# Patient Record
Sex: FEMALE | Race: WHITE | Employment: OTHER | URBAN - NONMETROPOLITAN AREA
[De-identification: names, ages, dates, MRNs, and addresses within clinical notes are randomized per-mention and may not be internally consistent; named-entity substitution may affect disease eponyms.]

---

## 2017-02-28 RX ORDER — LOVASTATIN 40 MG/1
40 TABLET ORAL NIGHTLY
Qty: 90 TABLET | Refills: 3 | Status: SHIPPED | OUTPATIENT
Start: 2017-02-28 | End: 2018-02-21 | Stop reason: SDUPTHER

## 2017-03-13 ENCOUNTER — OFFICE VISIT (OUTPATIENT)
Dept: PRIMARY CARE CLINIC | Age: 72
End: 2017-03-13
Payer: MEDICARE

## 2017-03-13 VITALS
HEIGHT: 61 IN | OXYGEN SATURATION: 97 % | RESPIRATION RATE: 20 BRPM | WEIGHT: 186 LBS | HEART RATE: 71 BPM | BODY MASS INDEX: 35.12 KG/M2 | DIASTOLIC BLOOD PRESSURE: 72 MMHG | SYSTOLIC BLOOD PRESSURE: 118 MMHG

## 2017-03-13 DIAGNOSIS — E03.9 ACQUIRED HYPOTHYROIDISM: ICD-10-CM

## 2017-03-13 DIAGNOSIS — I10 ESSENTIAL HYPERTENSION: ICD-10-CM

## 2017-03-13 DIAGNOSIS — Z13.820 OSTEOPOROSIS SCREENING: Primary | ICD-10-CM

## 2017-03-13 DIAGNOSIS — E11.9 TYPE 2 DIABETES MELLITUS WITHOUT COMPLICATION, WITHOUT LONG-TERM CURRENT USE OF INSULIN (HCC): ICD-10-CM

## 2017-03-13 DIAGNOSIS — I25.10 ASCVD (ARTERIOSCLEROTIC CARDIOVASCULAR DISEASE): ICD-10-CM

## 2017-03-13 DIAGNOSIS — E78.00 HYPERCHOLESTEROLEMIA: ICD-10-CM

## 2017-03-13 DIAGNOSIS — Z13.820 OSTEOPOROSIS SCREENING: ICD-10-CM

## 2017-03-13 LAB
ALBUMIN SERPL-MCNC: 4.5 G/DL (ref 3.5–5.2)
ALP BLD-CCNC: 66 U/L (ref 35–104)
ALT SERPL-CCNC: 15 U/L (ref 5–33)
ANION GAP SERPL CALCULATED.3IONS-SCNC: 12 MMOL/L (ref 7–19)
AST SERPL-CCNC: 15 U/L (ref 5–32)
BILIRUB SERPL-MCNC: 0.3 MG/DL (ref 0.2–1.2)
BILIRUBIN URINE: NEGATIVE
BLOOD, URINE: NEGATIVE
BUN BLDV-MCNC: 19 MG/DL (ref 8–23)
CALCIUM SERPL-MCNC: 9.4 MG/DL (ref 8.8–10.2)
CHLORIDE BLD-SCNC: 99 MMOL/L (ref 98–111)
CLARITY: CLEAR
CO2: 30 MMOL/L (ref 22–29)
COLOR: YELLOW
CREAT SERPL-MCNC: 0.7 MG/DL (ref 0.5–0.9)
CREATININE URINE: 128.2 MG/DL (ref 4.2–622)
GFR NON-AFRICAN AMERICAN: >60
GLOBULIN: 2.3 G/DL
GLUCOSE BLD-MCNC: 172 MG/DL (ref 74–109)
GLUCOSE URINE: NEGATIVE MG/DL
HBA1C MFR BLD: 7.1 %
HCT VFR BLD CALC: 44.8 % (ref 37–47)
HEMOGLOBIN: 14.6 G/DL (ref 12–16)
KETONES, URINE: NEGATIVE MG/DL
LEUKOCYTE ESTERASE, URINE: NEGATIVE
MCH RBC QN AUTO: 30.2 PG (ref 27–31)
MCHC RBC AUTO-ENTMCNC: 32.6 G/DL (ref 33–37)
MCV RBC AUTO: 92.8 FL (ref 81–99)
MICROALBUMIN UR-MCNC: <1.2 MG/DL (ref 0–19)
MICROALBUMIN/CREAT UR-RTO: NORMAL MG/G
NITRITE, URINE: NEGATIVE
PDW BLD-RTO: 13.2 % (ref 11.5–14.5)
PH UA: 5.5
PLATELET # BLD: 308 K/UL (ref 130–400)
PMV BLD AUTO: 10.9 FL (ref 7.4–10.4)
POTASSIUM SERPL-SCNC: 4.4 MMOL/L (ref 3.5–5)
PROTEIN PROTEIN: 11 MG/DL (ref 15–45)
PROTEIN UA: NEGATIVE MG/DL
RBC # BLD: 4.83 M/UL (ref 4.2–5.4)
SODIUM BLD-SCNC: 141 MMOL/L (ref 136–145)
SPECIFIC GRAVITY UA: 1.02
TOTAL PROTEIN: 6.8 G/DL (ref 6.6–8.7)
TSH SERPL DL<=0.05 MIU/L-ACNC: 1.18 UIU/ML (ref 0.27–4.2)
UROBILINOGEN, URINE: 0.2 E.U./DL
WBC # BLD: 7.8 K/UL (ref 4.8–10.8)

## 2017-03-13 PROCEDURE — G8400 PT W/DXA NO RESULTS DOC: HCPCS | Performed by: INTERNAL MEDICINE

## 2017-03-13 PROCEDURE — 1036F TOBACCO NON-USER: CPT | Performed by: INTERNAL MEDICINE

## 2017-03-13 PROCEDURE — 3017F COLORECTAL CA SCREEN DOC REV: CPT | Performed by: INTERNAL MEDICINE

## 2017-03-13 PROCEDURE — G8484 FLU IMMUNIZE NO ADMIN: HCPCS | Performed by: INTERNAL MEDICINE

## 2017-03-13 PROCEDURE — 3045F PR MOST RECENT HEMOGLOBIN A1C LEVEL 7.0-9.0%: CPT | Performed by: INTERNAL MEDICINE

## 2017-03-13 PROCEDURE — 1123F ACP DISCUSS/DSCN MKR DOCD: CPT | Performed by: INTERNAL MEDICINE

## 2017-03-13 PROCEDURE — 3014F SCREEN MAMMO DOC REV: CPT | Performed by: INTERNAL MEDICINE

## 2017-03-13 PROCEDURE — 1090F PRES/ABSN URINE INCON ASSESS: CPT | Performed by: INTERNAL MEDICINE

## 2017-03-13 PROCEDURE — G8598 ASA/ANTIPLAT THER USED: HCPCS | Performed by: INTERNAL MEDICINE

## 2017-03-13 PROCEDURE — G8417 CALC BMI ABV UP PARAM F/U: HCPCS | Performed by: INTERNAL MEDICINE

## 2017-03-13 PROCEDURE — 99214 OFFICE O/P EST MOD 30 MIN: CPT | Performed by: INTERNAL MEDICINE

## 2017-03-13 PROCEDURE — 4040F PNEUMOC VAC/ADMIN/RCVD: CPT | Performed by: INTERNAL MEDICINE

## 2017-03-13 PROCEDURE — G8427 DOCREV CUR MEDS BY ELIG CLIN: HCPCS | Performed by: INTERNAL MEDICINE

## 2017-03-13 ASSESSMENT — ENCOUNTER SYMPTOMS
CONSTIPATION: 0
SHORTNESS OF BREATH: 0
COUGH: 0
NAUSEA: 0
BACK PAIN: 0
DIARRHEA: 0
VOMITING: 0

## 2017-03-31 ENCOUNTER — TELEPHONE (OUTPATIENT)
Dept: PRIMARY CARE CLINIC | Age: 72
End: 2017-03-31

## 2017-04-13 ENCOUNTER — TELEPHONE (OUTPATIENT)
Dept: NEUROLOGY | Age: 72
End: 2017-04-13

## 2017-04-18 ENCOUNTER — TELEPHONE (OUTPATIENT)
Dept: PRIMARY CARE CLINIC | Age: 72
End: 2017-04-18

## 2017-04-20 DIAGNOSIS — R79.89 ABNORMAL THYROID BLOOD TEST: ICD-10-CM

## 2017-04-20 RX ORDER — LEVOTHYROXINE SODIUM 0.12 MG/1
125 TABLET ORAL DAILY
Qty: 30 TABLET | Refills: 11 | OUTPATIENT
Start: 2017-04-20 | End: 2018-02-26

## 2017-06-09 ENCOUNTER — OFFICE VISIT (OUTPATIENT)
Dept: NEUROLOGY | Age: 72
End: 2017-06-09
Payer: MEDICARE

## 2017-06-09 VITALS
SYSTOLIC BLOOD PRESSURE: 127 MMHG | DIASTOLIC BLOOD PRESSURE: 73 MMHG | HEIGHT: 61 IN | HEART RATE: 71 BPM | WEIGHT: 191 LBS | OXYGEN SATURATION: 95 % | BODY MASS INDEX: 36.06 KG/M2

## 2017-06-09 DIAGNOSIS — G47.33 OBSTRUCTIVE SLEEP APNEA (ADULT) (PEDIATRIC): Primary | ICD-10-CM

## 2017-06-09 DIAGNOSIS — Z99.89 CPAP (CONTINUOUS POSITIVE AIRWAY PRESSURE) DEPENDENCE: ICD-10-CM

## 2017-06-09 DIAGNOSIS — G47.61 PERIODIC LIMB MOVEMENT DISORDER: ICD-10-CM

## 2017-06-09 DIAGNOSIS — G44.59 OTHER COMPLICATED HEADACHE SYNDROME: ICD-10-CM

## 2017-06-09 DIAGNOSIS — G25.81 RESTLESS LEG SYNDROME: ICD-10-CM

## 2017-06-09 PROBLEM — R51.9 HEADACHE: Status: ACTIVE | Noted: 2017-06-09

## 2017-06-09 PROCEDURE — 99214 OFFICE O/P EST MOD 30 MIN: CPT | Performed by: PHYSICIAN ASSISTANT

## 2017-06-19 ENCOUNTER — OFFICE VISIT (OUTPATIENT)
Dept: PRIMARY CARE CLINIC | Age: 72
End: 2017-06-19
Payer: MEDICARE

## 2017-06-19 VITALS
DIASTOLIC BLOOD PRESSURE: 64 MMHG | SYSTOLIC BLOOD PRESSURE: 118 MMHG | BODY MASS INDEX: 34.78 KG/M2 | HEART RATE: 80 BPM | RESPIRATION RATE: 18 BRPM | HEIGHT: 62 IN | OXYGEN SATURATION: 98 % | WEIGHT: 189 LBS

## 2017-06-19 DIAGNOSIS — E78.00 HYPERCHOLESTEROLEMIA: ICD-10-CM

## 2017-06-19 DIAGNOSIS — G44.1 OTHER VASCULAR HEADACHE: ICD-10-CM

## 2017-06-19 DIAGNOSIS — Z13.820 OSTEOPOROSIS SCREENING: Primary | ICD-10-CM

## 2017-06-19 DIAGNOSIS — I25.10 ASCVD (ARTERIOSCLEROTIC CARDIOVASCULAR DISEASE): ICD-10-CM

## 2017-06-19 PROCEDURE — 99214 OFFICE O/P EST MOD 30 MIN: CPT | Performed by: INTERNAL MEDICINE

## 2017-06-19 ASSESSMENT — ENCOUNTER SYMPTOMS
COUGH: 0
CONSTIPATION: 0
VOMITING: 0
NAUSEA: 0
SHORTNESS OF BREATH: 0
DIARRHEA: 0
BACK PAIN: 0

## 2017-07-03 RX ORDER — VENLAFAXINE HYDROCHLORIDE 150 MG/1
150 CAPSULE, EXTENDED RELEASE ORAL DAILY
Qty: 30 CAPSULE | Refills: 5 | Status: SHIPPED | OUTPATIENT
Start: 2017-07-03 | End: 2017-10-31 | Stop reason: SDUPTHER

## 2017-07-14 ENCOUNTER — HOSPITAL ENCOUNTER (OUTPATIENT)
Dept: WOMENS IMAGING | Age: 72
Discharge: HOME | End: 2017-07-14
Payer: MEDICARE

## 2017-07-14 DIAGNOSIS — Z13.820 OSTEOPOROSIS SCREENING: ICD-10-CM

## 2017-07-14 PROCEDURE — 77080 DXA BONE DENSITY AXIAL: CPT

## 2017-07-17 ENCOUNTER — TELEPHONE (OUTPATIENT)
Dept: PRIMARY CARE CLINIC | Age: 72
End: 2017-07-17

## 2017-07-17 RX ORDER — ALENDRONATE SODIUM 70 MG/1
70 TABLET ORAL
Qty: 4 TABLET | Refills: 3 | Status: SHIPPED | OUTPATIENT
Start: 2017-07-17 | End: 2017-10-31 | Stop reason: SDUPTHER

## 2017-08-16 RX ORDER — LISINOPRIL 10 MG/1
10 TABLET ORAL DAILY
Qty: 30 TABLET | Refills: 5 | Status: SHIPPED | OUTPATIENT
Start: 2017-08-16 | End: 2018-02-25 | Stop reason: SDUPTHER

## 2017-09-19 ENCOUNTER — OFFICE VISIT (OUTPATIENT)
Dept: PRIMARY CARE CLINIC | Age: 72
End: 2017-09-19
Payer: MEDICARE

## 2017-09-19 VITALS
HEIGHT: 62 IN | HEART RATE: 77 BPM | TEMPERATURE: 97.2 F | SYSTOLIC BLOOD PRESSURE: 120 MMHG | OXYGEN SATURATION: 95 % | DIASTOLIC BLOOD PRESSURE: 68 MMHG | WEIGHT: 185.6 LBS | BODY MASS INDEX: 34.16 KG/M2

## 2017-09-19 DIAGNOSIS — I10 ESSENTIAL HYPERTENSION: ICD-10-CM

## 2017-09-19 DIAGNOSIS — E03.9 ACQUIRED HYPOTHYROIDISM: Primary | ICD-10-CM

## 2017-09-19 DIAGNOSIS — I25.10 ASCVD (ARTERIOSCLEROTIC CARDIOVASCULAR DISEASE): ICD-10-CM

## 2017-09-19 PROCEDURE — 99214 OFFICE O/P EST MOD 30 MIN: CPT | Performed by: INTERNAL MEDICINE

## 2017-09-19 ASSESSMENT — ENCOUNTER SYMPTOMS
VOMITING: 0
SHORTNESS OF BREATH: 0
NAUSEA: 0
BACK PAIN: 0
DIARRHEA: 0
COUGH: 0
CONSTIPATION: 0

## 2017-09-25 ENCOUNTER — TELEPHONE (OUTPATIENT)
Dept: PRIMARY CARE CLINIC | Age: 72
End: 2017-09-25

## 2017-09-27 DIAGNOSIS — I25.10 UNSPECIFIED CARDIOVASCULAR DISEASE: Primary | ICD-10-CM

## 2017-09-28 ENCOUNTER — HOSPITAL ENCOUNTER (OUTPATIENT)
Dept: NON INVASIVE DIAGNOSTICS | Age: 72
Discharge: HOME | End: 2017-09-28
Payer: MEDICARE

## 2017-09-28 VITALS
SYSTOLIC BLOOD PRESSURE: 118 MMHG | WEIGHT: 185.19 LBS | BODY MASS INDEX: 33.87 KG/M2 | DIASTOLIC BLOOD PRESSURE: 72 MMHG | HEART RATE: 67 BPM | RESPIRATION RATE: 20 BRPM

## 2017-09-28 DIAGNOSIS — I25.10 UNSPECIFIED CARDIOVASCULAR DISEASE: ICD-10-CM

## 2017-09-28 DIAGNOSIS — I25.10 ASCVD (ARTERIOSCLEROTIC CARDIOVASCULAR DISEASE): ICD-10-CM

## 2017-09-28 PROCEDURE — 2580000003 HC RX 258: Performed by: INTERNAL MEDICINE

## 2017-09-28 PROCEDURE — 93017 CV STRESS TEST TRACING ONLY: CPT

## 2017-09-28 PROCEDURE — 6360000002 HC RX W HCPCS: Performed by: INTERNAL MEDICINE

## 2017-09-28 RX ORDER — DOBUTAMINE HYDROCHLORIDE 200 MG/100ML
10 INJECTION INTRAVENOUS CONTINUOUS PRN
Status: ACTIVE | OUTPATIENT
Start: 2017-09-28 | End: 2017-09-29

## 2017-09-28 RX ORDER — SODIUM CHLORIDE 9 MG/ML
INJECTION, SOLUTION INTRAVENOUS
Status: COMPLETED | OUTPATIENT
Start: 2017-09-28 | End: 2017-09-28

## 2017-09-28 RX ADMIN — SODIUM CHLORIDE: 9 INJECTION, SOLUTION INTRAVENOUS at 11:10

## 2017-09-28 RX ADMIN — DOBUTAMINE IN DEXTROSE 30 MCG/KG/MIN: 200 INJECTION, SOLUTION INTRAVENOUS at 11:21

## 2017-09-28 RX ADMIN — DOBUTAMINE IN DEXTROSE 10 MCG/KG/MIN: 200 INJECTION, SOLUTION INTRAVENOUS at 11:17

## 2017-10-16 DIAGNOSIS — E07.9 THYROID CONDITION: Primary | ICD-10-CM

## 2017-10-16 DIAGNOSIS — E07.9 THYROID CONDITION: ICD-10-CM

## 2017-10-16 LAB — TSH SERPL DL<=0.05 MIU/L-ACNC: 13.13 UIU/ML (ref 0.27–4.2)

## 2017-10-19 ENCOUNTER — TELEPHONE (OUTPATIENT)
Dept: PRIMARY CARE CLINIC | Age: 72
End: 2017-10-19

## 2017-10-19 RX ORDER — LEVOTHYROXINE SODIUM 0.15 MG/1
150 TABLET ORAL DAILY
Qty: 30 TABLET | Refills: 3 | Status: SHIPPED | OUTPATIENT
Start: 2017-10-19 | End: 2018-02-26 | Stop reason: SDUPTHER

## 2017-10-31 RX ORDER — ALENDRONATE SODIUM 70 MG/1
70 TABLET ORAL
Qty: 4 TABLET | Refills: 3 | Status: SHIPPED | OUTPATIENT
Start: 2017-10-31 | End: 2018-02-28 | Stop reason: SDUPTHER

## 2017-10-31 RX ORDER — VENLAFAXINE HYDROCHLORIDE 150 MG/1
150 CAPSULE, EXTENDED RELEASE ORAL DAILY
Qty: 30 CAPSULE | Refills: 5 | Status: SHIPPED | OUTPATIENT
Start: 2017-10-31 | End: 2018-01-21

## 2017-11-29 ENCOUNTER — OFFICE VISIT (OUTPATIENT)
Dept: URGENT CARE | Age: 72
End: 2017-11-29
Payer: MEDICARE

## 2017-11-29 ENCOUNTER — HOSPITAL ENCOUNTER (OUTPATIENT)
Dept: GENERAL RADIOLOGY | Age: 72
Discharge: HOME | End: 2017-11-29
Payer: MEDICARE

## 2017-11-29 VITALS
OXYGEN SATURATION: 94 % | DIASTOLIC BLOOD PRESSURE: 66 MMHG | BODY MASS INDEX: 34.41 KG/M2 | SYSTOLIC BLOOD PRESSURE: 113 MMHG | HEIGHT: 62 IN | HEART RATE: 80 BPM | RESPIRATION RATE: 18 BRPM | WEIGHT: 187 LBS | TEMPERATURE: 98.6 F

## 2017-11-29 DIAGNOSIS — M25.511 ACUTE PAIN OF RIGHT SHOULDER: ICD-10-CM

## 2017-11-29 DIAGNOSIS — M62.838 MUSCLE SPASM: ICD-10-CM

## 2017-11-29 DIAGNOSIS — M25.511 ACUTE PAIN OF RIGHT SHOULDER: Primary | ICD-10-CM

## 2017-11-29 PROCEDURE — 73030 X-RAY EXAM OF SHOULDER: CPT

## 2017-11-29 PROCEDURE — 1090F PRES/ABSN URINE INCON ASSESS: CPT | Performed by: NURSE PRACTITIONER

## 2017-11-29 PROCEDURE — G8427 DOCREV CUR MEDS BY ELIG CLIN: HCPCS | Performed by: NURSE PRACTITIONER

## 2017-11-29 PROCEDURE — G8399 PT W/DXA RESULTS DOCUMENT: HCPCS | Performed by: NURSE PRACTITIONER

## 2017-11-29 PROCEDURE — G8417 CALC BMI ABV UP PARAM F/U: HCPCS | Performed by: NURSE PRACTITIONER

## 2017-11-29 PROCEDURE — 99213 OFFICE O/P EST LOW 20 MIN: CPT | Performed by: NURSE PRACTITIONER

## 2017-11-29 PROCEDURE — 3014F SCREEN MAMMO DOC REV: CPT | Performed by: NURSE PRACTITIONER

## 2017-11-29 PROCEDURE — 4040F PNEUMOC VAC/ADMIN/RCVD: CPT | Performed by: NURSE PRACTITIONER

## 2017-11-29 PROCEDURE — G8484 FLU IMMUNIZE NO ADMIN: HCPCS | Performed by: NURSE PRACTITIONER

## 2017-11-29 PROCEDURE — G8598 ASA/ANTIPLAT THER USED: HCPCS | Performed by: NURSE PRACTITIONER

## 2017-11-29 PROCEDURE — 1123F ACP DISCUSS/DSCN MKR DOCD: CPT | Performed by: NURSE PRACTITIONER

## 2017-11-29 PROCEDURE — 1036F TOBACCO NON-USER: CPT | Performed by: NURSE PRACTITIONER

## 2017-11-29 PROCEDURE — 3017F COLORECTAL CA SCREEN DOC REV: CPT | Performed by: NURSE PRACTITIONER

## 2017-11-29 RX ORDER — PREDNISONE 10 MG/1
TABLET ORAL
Qty: 30 TABLET | Refills: 0 | Status: SHIPPED | OUTPATIENT
Start: 2017-11-29 | End: 2018-09-20

## 2017-11-29 RX ORDER — BACLOFEN 10 MG/1
10 TABLET ORAL 2 TIMES DAILY
Qty: 30 TABLET | Refills: 0 | Status: SHIPPED | OUTPATIENT
Start: 2017-11-29 | End: 2019-11-13

## 2017-11-29 ASSESSMENT — ENCOUNTER SYMPTOMS: RESPIRATORY NEGATIVE: 1

## 2017-11-29 NOTE — PATIENT INSTRUCTIONS
Patient Education        Shoulder Pain: Care Instructions  Your Care Instructions    You can hurt your shoulder by using it too much during an activity, such as fishing or baseball. It can also happen as part of the everyday wear and tear of getting older. Shoulder injuries can be slow to heal, but your shoulder should get better with time. Your doctor may recommend a sling to rest your shoulder. If you have injured your shoulder, you may need testing and treatment. Follow-up care is a key part of your treatment and safety. Be sure to make and go to all appointments, and call your doctor if you are having problems. It's also a good idea to know your test results and keep a list of the medicines you take. How can you care for yourself at home? · Take pain medicines exactly as directed. ¨ If the doctor gave you a prescription medicine for pain, take it as prescribed. ¨ If you are not taking a prescription pain medicine, ask your doctor if you can take an over-the-counter medicine. ¨ Do not take two or more pain medicines at the same time unless the doctor told you to. Many pain medicines contain acetaminophen, which is Tylenol. Too much acetaminophen (Tylenol) can be harmful. · If your doctor recommends that you wear a sling, use it as directed. Do not take it off before your doctor tells you to. · Put ice or a cold pack on the sore area for 10 to 20 minutes at a time. Put a thin cloth between the ice and your skin. · If there is no swelling, you can put moist heat, a heating pad, or a warm cloth on your shoulder. Some doctors suggest alternating between hot and cold. · Rest your shoulder for a few days. If your doctor recommends it, you can then begin gentle exercise of the shoulder, but do not lift anything heavy. When should you call for help? Call 911 anytime you think you may need emergency care. For example, call if:  · You have chest pain or pressure.  This may occur with:  ¨ Sweating. ¨ Shortness of breath. ¨ Nausea or vomiting. ¨ Pain that spreads from the chest to the neck, jaw, or one or both shoulders or arms. ¨ Dizziness or lightheadedness. ¨ A fast or uneven pulse. After calling 911, chew 1 adult-strength aspirin. Wait for an ambulance. Do not try to drive yourself. · Your arm or hand is cool or pale or changes color. Call your doctor now or seek immediate medical care if:  · You have signs of infection, such as:  ¨ Increased pain, swelling, warmth, or redness in your shoulder. ¨ Red streaks leading from a place on your shoulder. ¨ Pus draining from an area of your shoulder. ¨ Swollen lymph nodes in your neck, armpits, or groin. ¨ A fever. Watch closely for changes in your health, and be sure to contact your doctor if:  · You cannot use your shoulder. · Your shoulder does not get better as expected. Where can you learn more? Go to [email or web address] org and sign in to your twidox account. Enter M485 in the [employer] box to learn more about \"Shoulder Pain: Care Instructions. \"     If you do not have an account, please click on the \"Sign Up Now\" link. Current as of: March 21, 2017  Content Version: 11.3  © 5593-3989 [employer]. Care instructions adapted under license by [facility] ([facility]). If you have questions about a medical condition or this instruction, always ask your healthcare professional. [place] [address] any warranty or liability for your use of this information. Patient Education        Shoulder Stretches: Exercises  Your Care Instructions  Here are some examples of exercises for your shoulder. Start each exercise slowly. Ease off the exercise if you start to have pain. Your doctor or physical therapist will tell you when you can start these exercises and which ones will work best for you.   How to do the exercises  Note: These exercises should cause you to feel a gentle stretch, but no pain. Shoulder stretch    1.  a doorway and place one arm against the door frame. Your elbow should be a little higher than your shoulder. 2. Relax your shoulders as you lean forward, allowing your chest and shoulder muscles to stretch. You can also turn your body slightly away from your arm to stretch the muscles even more. 3. Hold for 15 to 30 seconds. 4. Repeat 2 to 4 times with each arm. Shoulder and chest stretch    Shoulder and chest stretch  1. While sitting, relax your upper body so you slump slightly in your chair. 2. As you breathe in, straighten your back and open your arms out to the sides. 3. Gently pull your shoulder blades back and downward. 4. Hold for 15 to 30 seconds as your breathe normally. 5. Repeat 2 to 4 times. Overhead stretch    1. Reach up over your head with both arms. 2. Hold for 15 to 30 seconds. 3. Repeat 2 to 4 times. Follow-up care is a key part of your treatment and safety. Be sure to make and go to all appointments, and call your doctor if you are having problems. It's also a good idea to know your test results and keep a list of the medicines you take. Where can you learn more? Go to [email or web address] org and sign in to your IWT account. Enter S254 in the [facility] box to learn more about \"Shoulder Stretches: Exercises. \"     If you do not have an account, please click on the \"Sign Up Now\" link. Current as of: March 21, 2017  Content Version: 11.3  © 5762-1853 [employer]. Care instructions adapted under license by [facility] ([facility]). If you have questions about a medical condition or this instruction, always ask your healthcare professional. [address] any warranty or liability for your use of this information. 1. Take prednisone as directed  2. Continue Tylenol/Ibuprofen as needed for pain  3. She may use baclofen as directed  4. Rest, use ice/heat as needed.    5. Do light ROM exercises and stretches  6.  If patient is not improving or developing any new/worsening symptoms then RTC, or follow up with PCP as needed for possible PT or ortho consult

## 2017-11-29 NOTE — PROGRESS NOTES
[address] [address]  Dept: [phone]  Loc: [phone]    Kalyan Gillis is a 71 y.o. female who presents today for her medical conditions/complaints as noted below. Kalyan Gillis is c/o of Shoulder Injury (Right shoulder pain from fall 2 weeks ago.)        HPI:     HPI    Patient presents to office complaining of right shoulder pain. She states that she fell 2 weeks ago, she tripped over a cord. She reports that she fell flat on that shoulder. She states that her pain has gotten worse. She reports that it is now hard to move her arm above her head to fix her hair. She rates pain a 10/10. She reports that she has been taking Tylenol and Ibuprofen as needed for pain. She has been using a pain patch on and off as well. She states that the pain is getting worse and she is having trouble sleeping with it. Past Medical History:   Diagnosis Date    CAD (coronary artery disease)     Carotid artery disease (HCC)     CPAP (continuous positive airway pressure) dependence     11cm    Depression     Diabetes (HCC)     Fibromyalgia     Hypothyroidism     JOSE (obstructive sleep apnea)     AHI:  27.3 per PSG, 2011/CPAP    PLMD (periodic limb movement disorder)     RLS (restless legs syndrome)       Past Surgical History:   Procedure Laterality Date    OTHER SURGICAL HISTORY      teeth extractions    PTCA      with stents    TUBAL LIGATION         History reviewed. No pertinent family history.     Social History   Substance Use Topics    Smoking status: Never Smoker    Smokeless tobacco: Never Used    Alcohol use No      Current Outpatient Prescriptions   Medication Sig Dispense Refill    alendronate (FOSAMAX) 70 MG tablet Take 1 tablet by mouth every 7 days 4 tablet 3    venlafaxine (EFFEXOR XR) 150 MG extended release capsule Take 1 capsule by mouth daily 30 capsule 5    levothyroxine (SYNTHROID) 150 MCG tablet Take 1 tablet by mouth Daily 30 tablet 3    metFORMIN (GLUCOPHAGE) 1000 MG tablet Take 1 tablet by mouth 2 times daily (with meals) 60 tablet 5    lisinopril (PRINIVIL;ZESTRIL) 10 MG tablet Take 1 tablet by mouth daily 30 tablet 5    levothyroxine (LEVOTHROID) 125 MCG tablet Take 1 tablet by mouth daily 30 tablet 11    vitamin D (CHOLECALCIFEROL) 1000 UNIT TABS tablet Take 1,000 Units by mouth daily      lovastatin (MEVACOR) 40 MG tablet Take 1 tablet by mouth nightly 90 tablet 3    clopidogrel (PLAVIX) 75 MG tablet Take 75 mg by mouth daily   2    glimepiride (AMARYL) 4 MG tablet Take 4 mg by mouth 2 times daily   1    vitamin B-12 (CYANOCOBALAMIN) 1000 MCG tablet Take 5,000 mcg by mouth daily       Pediatric Multivitamins-Fl (MULT-VITAMIN/FLUORIDE PO) Take by mouth daily        No current facility-administered medications for this visit. Allergies   Allergen Reactions    Bee Venom Shortness Of Breath and Swelling    Adhesive Tape Rash       Health Maintenance   Topic Date Due    Diabetic foot exam  01/16/1955    Diabetic retinal exam  01/16/1955    Pneumococcal low/med risk (1 of 2 - PCV13) 01/16/2011    Flu vaccine (1) 09/01/2017    Zostavax vaccine  07/25/2018 (Originally 1/16/2006)    Lipid screen  12/16/2017    Diabetic hemoglobin A1C test  03/13/2018    Diabetic microalbuminuria test  03/13/2018    Breast cancer screen  10/21/2018    DTaP/Tdap/Td vaccine (2 - Td) 06/09/2025    Colon cancer screen colonoscopy  01/21/2026    DEXA (modify frequency per FRAX score)  Completed    Hepatitis C screen  Completed       Subjective:      Review of Systems   Respiratory: Negative. Cardiovascular: Negative. Musculoskeletal:        Right shoulder pain         Objective:     Physical Exam   Constitutional: She is oriented to person, place, and time. She appears well-developed and well-nourished. No distress. HENT:   Head: Normocephalic and atraumatic.    Eyes: Pupils are equal,

## 2018-01-21 RX ORDER — VENLAFAXINE HYDROCHLORIDE 150 MG/1
CAPSULE, EXTENDED RELEASE ORAL
Qty: 30 CAPSULE | Refills: 5 | Status: SHIPPED | OUTPATIENT
Start: 2018-01-21 | End: 2018-07-30 | Stop reason: SDUPTHER

## 2018-02-26 RX ORDER — LISINOPRIL 10 MG/1
10 TABLET ORAL DAILY
Qty: 30 TABLET | Refills: 5 | Status: SHIPPED | OUTPATIENT
Start: 2018-02-26 | End: 2018-10-15 | Stop reason: SDUPTHER

## 2018-02-26 RX ORDER — LEVOTHYROXINE SODIUM 0.15 MG/1
150 TABLET ORAL DAILY
Qty: 30 TABLET | Refills: 3 | Status: SHIPPED | OUTPATIENT
Start: 2018-02-26 | End: 2018-03-27 | Stop reason: SDUPTHER

## 2018-02-28 RX ORDER — ALENDRONATE SODIUM 70 MG/1
70 TABLET ORAL
Qty: 12 TABLET | Refills: 3 | Status: SHIPPED | OUTPATIENT
Start: 2018-02-28 | End: 2018-04-02 | Stop reason: SDUPTHER

## 2018-03-19 ENCOUNTER — OFFICE VISIT (OUTPATIENT)
Dept: PRIMARY CARE CLINIC | Age: 73
End: 2018-03-19
Payer: MEDICARE

## 2018-03-19 VITALS
SYSTOLIC BLOOD PRESSURE: 130 MMHG | HEIGHT: 61 IN | DIASTOLIC BLOOD PRESSURE: 64 MMHG | BODY MASS INDEX: 32.28 KG/M2 | OXYGEN SATURATION: 93 % | TEMPERATURE: 97.5 F | HEART RATE: 116 BPM | WEIGHT: 171 LBS

## 2018-03-19 DIAGNOSIS — I25.10 ASCVD (ARTERIOSCLEROTIC CARDIOVASCULAR DISEASE): ICD-10-CM

## 2018-03-19 DIAGNOSIS — E78.00 HYPERCHOLESTEROLEMIA: ICD-10-CM

## 2018-03-19 DIAGNOSIS — E11.9 TYPE 2 DIABETES MELLITUS WITHOUT COMPLICATION, WITHOUT LONG-TERM CURRENT USE OF INSULIN (HCC): ICD-10-CM

## 2018-03-19 DIAGNOSIS — I10 ESSENTIAL HYPERTENSION: ICD-10-CM

## 2018-03-19 DIAGNOSIS — E03.9 ACQUIRED HYPOTHYROIDISM: Primary | ICD-10-CM

## 2018-03-19 DIAGNOSIS — Z13.9 SCREENING FOR CONDITION: ICD-10-CM

## 2018-03-19 DIAGNOSIS — M79.7 FIBROMYALGIA: ICD-10-CM

## 2018-03-19 PROCEDURE — 3014F SCREEN MAMMO DOC REV: CPT | Performed by: INTERNAL MEDICINE

## 2018-03-19 PROCEDURE — G8417 CALC BMI ABV UP PARAM F/U: HCPCS | Performed by: INTERNAL MEDICINE

## 2018-03-19 PROCEDURE — 1090F PRES/ABSN URINE INCON ASSESS: CPT | Performed by: INTERNAL MEDICINE

## 2018-03-19 PROCEDURE — G8399 PT W/DXA RESULTS DOCUMENT: HCPCS | Performed by: INTERNAL MEDICINE

## 2018-03-19 PROCEDURE — 99214 OFFICE O/P EST MOD 30 MIN: CPT | Performed by: INTERNAL MEDICINE

## 2018-03-19 PROCEDURE — 1123F ACP DISCUSS/DSCN MKR DOCD: CPT | Performed by: INTERNAL MEDICINE

## 2018-03-19 PROCEDURE — 3046F HEMOGLOBIN A1C LEVEL >9.0%: CPT | Performed by: INTERNAL MEDICINE

## 2018-03-19 PROCEDURE — G8598 ASA/ANTIPLAT THER USED: HCPCS | Performed by: INTERNAL MEDICINE

## 2018-03-19 PROCEDURE — 1036F TOBACCO NON-USER: CPT | Performed by: INTERNAL MEDICINE

## 2018-03-19 PROCEDURE — 3017F COLORECTAL CA SCREEN DOC REV: CPT | Performed by: INTERNAL MEDICINE

## 2018-03-19 PROCEDURE — G8427 DOCREV CUR MEDS BY ELIG CLIN: HCPCS | Performed by: INTERNAL MEDICINE

## 2018-03-19 PROCEDURE — 4040F PNEUMOC VAC/ADMIN/RCVD: CPT | Performed by: INTERNAL MEDICINE

## 2018-03-19 PROCEDURE — G8484 FLU IMMUNIZE NO ADMIN: HCPCS | Performed by: INTERNAL MEDICINE

## 2018-03-19 ASSESSMENT — ENCOUNTER SYMPTOMS
SINUS PRESSURE: 0
NAUSEA: 0
BACK PAIN: 1
VOMITING: 0
CONSTIPATION: 0
SHORTNESS OF BREATH: 0
DIARRHEA: 0
SINUS PAIN: 0
COUGH: 0

## 2018-03-19 NOTE — PROGRESS NOTES
[facility]  [facility] [facility]  [address] [place] [address]  Dept: [phone]  Dept Fax: [phone]  Loc: [phone]    Flaco Mckeon is a 68 y.o. female who presents today for her medical conditions/complaints as noted below. Flaco Mckeon is c/o of   Chief Complaint   Patient presents with    6 Month Follow-Up    Chronic Pain         HPI:     HPI  Ms. Duane Key returns for follow-up of hypothyroidism, hypertension, coronary artery disease, type 2 diabetes. She is doing well. Her breathing pattern is stable. Her appetite is stable. She is due for blood work as well as mammograms. She denies any new complaints. Hemoglobin A1C (%)   Date Value   03/23/2018 12.2 (H)   03/13/2017 7.1 (H)   12/16/2016 8.6 (H)             ( goal A1C is < 7)   Microalbumin, Random Urine (mg/dL)   Date Value   03/13/2017 <1.20     LDL Calculated (mg/dL)   Date Value   03/23/2018 55   12/16/2016 126       (goal LDL is <100)   AST (U/L)   Date Value   03/23/2018 16     ALT (U/L)   Date Value   03/23/2018 18     BUN (mg/dL)   Date Value   03/23/2018 14     BP Readings from Last 3 Encounters:   03/19/18 130/64   11/29/17 113/66   09/28/17 118/72          (goal 120/80)    Past Medical History:   Diagnosis Date    CAD (coronary artery disease)     Carotid artery disease (HCC)     CPAP (continuous positive airway pressure) dependence     11cm    Depression     Diabetes (HCC)     Fibromyalgia     Hypothyroidism     JOSE (obstructive sleep apnea)     AHI:  27.3 per PSG, 2011/CPAP    PLMD (periodic limb movement disorder)     RLS (restless legs syndrome)       Past Surgical History:   Procedure Laterality Date    OTHER SURGICAL HISTORY      teeth extractions    PTCA      with stents    TUBAL LIGATION         History reviewed. No pertinent family history.     Social History   Substance Use Topics    Smoking status: Never Smoker    Smokeless tobacco: Never Used   Sarkar Protein, urine, random    TSH without Reflex    Urinalysis    Lipid Panel   3. ASCVD (arteriosclerotic cardiovascular disease)  CBC    Comprehensive Metabolic Panel    Creatinine, Random Urine    Protein, urine, random    TSH without Reflex    Urinalysis    Lipid Panel   4. Hypercholesterolemia  CBC    Comprehensive Metabolic Panel    Creatinine, Random Urine    Protein, urine, random    TSH without Reflex    Urinalysis    Lipid Panel   5. Fibromyalgia  CBC    Comprehensive Metabolic Panel    Creatinine, Random Urine    Protein, urine, random    TSH without Reflex    Urinalysis    Lipid Panel   6. Type 2 diabetes mellitus without complication, without long-term current use of insulin (HCC)  Hemoglobin A1C   7. Screening for condition  CONNOR DIGITAL SCREEN BILATERAL             Plan:      Return in about 6 months (around 9/19/2018). Patient Instructions   Fasting blood work at your convenience. Continue current medications. Obtain mammograms. Follow up again in 6 months.       Orders Placed This Encounter   Procedures    CONNOR DIGITAL SCREEN BILATERAL     Standing Status:   Future     Standing Expiration Date:   5/19/2019    CBC     Standing Status:   Future     Number of Occurrences:   1     Standing Expiration Date:   3/19/2019    Comprehensive Metabolic Panel     Standing Status:   Future     Number of Occurrences:   1     Standing Expiration Date:   3/19/2019    Creatinine, Random Urine     Standing Status:   Future     Number of Occurrences:   1     Standing Expiration Date:   3/19/2019    Protein, urine, random     Standing Status:   Future     Number of Occurrences:   1     Standing Expiration Date:   3/19/2019    TSH without Reflex     Standing Status:   Future     Number of Occurrences:   1     Standing Expiration Date:   3/19/2019    Urinalysis     Standing Status:   Future     Number of Occurrences:   1     Standing Expiration Date:   3/19/2019    Lipid Panel     Standing Status:   Future     Number

## 2018-03-23 DIAGNOSIS — E03.9 ACQUIRED HYPOTHYROIDISM: ICD-10-CM

## 2018-03-23 DIAGNOSIS — E11.9 TYPE 2 DIABETES MELLITUS WITHOUT COMPLICATION, WITHOUT LONG-TERM CURRENT USE OF INSULIN (HCC): ICD-10-CM

## 2018-03-23 DIAGNOSIS — M79.7 FIBROMYALGIA: ICD-10-CM

## 2018-03-23 DIAGNOSIS — E78.00 HYPERCHOLESTEROLEMIA: ICD-10-CM

## 2018-03-23 DIAGNOSIS — I25.10 ASCVD (ARTERIOSCLEROTIC CARDIOVASCULAR DISEASE): ICD-10-CM

## 2018-03-23 DIAGNOSIS — I10 ESSENTIAL HYPERTENSION: ICD-10-CM

## 2018-03-23 LAB
ALBUMIN SERPL-MCNC: 3.9 G/DL (ref 3.5–5.2)
ALP BLD-CCNC: 81 U/L (ref 35–104)
ALT SERPL-CCNC: 18 U/L (ref 5–33)
ANION GAP SERPL CALCULATED.3IONS-SCNC: 13 MMOL/L (ref 7–19)
AST SERPL-CCNC: 16 U/L (ref 5–32)
BILIRUB SERPL-MCNC: 0.5 MG/DL (ref 0.2–1.2)
BILIRUBIN URINE: NEGATIVE
BLOOD, URINE: NEGATIVE
BUN BLDV-MCNC: 14 MG/DL (ref 8–23)
CALCIUM SERPL-MCNC: 9.3 MG/DL (ref 8.8–10.2)
CHLORIDE BLD-SCNC: 101 MMOL/L (ref 98–111)
CHOLESTEROL, TOTAL: 149 MG/DL (ref 160–199)
CLARITY: CLEAR
CO2: 26 MMOL/L (ref 22–29)
COLOR: YELLOW
CREAT SERPL-MCNC: 0.6 MG/DL (ref 0.5–0.9)
CREATININE URINE: 106.5 MG/DL (ref 4.2–622)
GFR NON-AFRICAN AMERICAN: >60
GLUCOSE BLD-MCNC: 324 MG/DL (ref 74–109)
GLUCOSE URINE: >=1000 MG/DL
HBA1C MFR BLD: 12.2 %
HCT VFR BLD CALC: 43.5 % (ref 37–47)
HDLC SERPL-MCNC: 50 MG/DL (ref 65–121)
HEMOGLOBIN: 14.4 G/DL (ref 12–16)
KETONES, URINE: ABNORMAL MG/DL
LDL CHOLESTEROL CALCULATED: 55 MG/DL
LEUKOCYTE ESTERASE, URINE: NEGATIVE
MCH RBC QN AUTO: 29 PG (ref 27–31)
MCHC RBC AUTO-ENTMCNC: 33.1 G/DL (ref 33–37)
MCV RBC AUTO: 87.7 FL (ref 81–99)
NITRITE, URINE: NEGATIVE
PDW BLD-RTO: 12.1 % (ref 11.5–14.5)
PH UA: 6
PLATELET # BLD: 286 K/UL (ref 130–400)
PMV BLD AUTO: 10.7 FL (ref 9.4–12.3)
POTASSIUM SERPL-SCNC: 4.4 MMOL/L (ref 3.5–5)
PROTEIN PROTEIN: 17 MG/DL (ref 15–45)
PROTEIN UA: NEGATIVE MG/DL
RBC # BLD: 4.96 M/UL (ref 4.2–5.4)
SODIUM BLD-SCNC: 140 MMOL/L (ref 136–145)
SPECIFIC GRAVITY UA: 1.04
TOTAL PROTEIN: 6.3 G/DL (ref 6.6–8.7)
TRIGL SERPL-MCNC: 222 MG/DL (ref 0–149)
TSH SERPL DL<=0.05 MIU/L-ACNC: 0.01 UIU/ML (ref 0.27–4.2)
UROBILINOGEN, URINE: 0.2 E.U./DL
WBC # BLD: 6.5 K/UL (ref 4.8–10.8)

## 2018-03-27 ENCOUNTER — TELEPHONE (OUTPATIENT)
Dept: PRIMARY CARE CLINIC | Age: 73
End: 2018-03-27

## 2018-03-27 RX ORDER — LEVOTHYROXINE SODIUM 0.1 MG/1
100 TABLET ORAL DAILY
Qty: 90 TABLET | Refills: 0 | Status: SHIPPED | OUTPATIENT
Start: 2018-03-27 | End: 2018-05-07 | Stop reason: SDUPTHER

## 2018-03-27 NOTE — TELEPHONE ENCOUNTER
----- Message from Ludmila Dubose DO sent at 3/23/2018 12:10 PM CDT -----    Called pt and ordered med    Requested Prescriptions     Signed Prescriptions Disp Refills    levothyroxine (SYNTHROID) 100 MCG tablet 90 tablet 0     Sig: Take 1 tablet by mouth Daily     Authorizing Provider: Uzma Cantrell User: Norman Palm empagliflozin (JARDIANCE) 25 MG tablet 90 tablet 0     Sig: Take 25 mg by mouth daily     Authorizing Provider: [address] [place] [address] User: Melanie Rich Decrease her Synthroid to 100 µg daily. Start Jardiance 25 mg once daily.

## 2018-04-02 RX ORDER — ALENDRONATE SODIUM 70 MG/1
70 TABLET ORAL
Qty: 12 TABLET | Refills: 3 | Status: SHIPPED | OUTPATIENT
Start: 2018-04-02 | End: 2018-04-02 | Stop reason: SDUPTHER

## 2018-04-02 RX ORDER — ALENDRONATE SODIUM 70 MG/1
70 TABLET ORAL
Qty: 12 TABLET | Refills: 3 | Status: SHIPPED | OUTPATIENT
Start: 2018-04-02 | End: 2018-09-20 | Stop reason: SDUPTHER

## 2018-05-07 RX ORDER — LEVOTHYROXINE SODIUM 0.1 MG/1
100 TABLET ORAL DAILY
Qty: 90 TABLET | Refills: 0 | Status: SHIPPED | OUTPATIENT
Start: 2018-05-07 | End: 2018-10-13 | Stop reason: SDUPTHER

## 2018-05-07 RX ORDER — LEVOTHYROXINE SODIUM 0.12 MG/1
TABLET ORAL
Qty: 30 TABLET | Refills: 11 | OUTPATIENT
Start: 2018-05-07

## 2018-07-05 ENCOUNTER — HOSPITAL ENCOUNTER (OUTPATIENT)
Dept: WOMENS IMAGING | Age: 73
Discharge: HOME | End: 2018-07-05
Payer: MEDICARE

## 2018-07-05 DIAGNOSIS — Z13.9 SCREENING FOR CONDITION: ICD-10-CM

## 2018-07-05 PROCEDURE — 77063 BREAST TOMOSYNTHESIS BI: CPT

## 2018-07-31 RX ORDER — VENLAFAXINE HYDROCHLORIDE 150 MG/1
CAPSULE, EXTENDED RELEASE ORAL
Qty: 30 CAPSULE | Refills: 5 | Status: SHIPPED | OUTPATIENT
Start: 2018-07-31 | End: 2019-01-08 | Stop reason: SDUPTHER

## 2018-09-20 ENCOUNTER — OFFICE VISIT (OUTPATIENT)
Dept: PRIMARY CARE CLINIC | Age: 73
End: 2018-09-20
Payer: MEDICARE

## 2018-09-20 VITALS
HEART RATE: 80 BPM | TEMPERATURE: 97.5 F | WEIGHT: 158.6 LBS | BODY MASS INDEX: 29.19 KG/M2 | SYSTOLIC BLOOD PRESSURE: 110 MMHG | DIASTOLIC BLOOD PRESSURE: 62 MMHG | HEIGHT: 62 IN | OXYGEN SATURATION: 97 %

## 2018-09-20 DIAGNOSIS — E11.8 TYPE 2 DIABETES MELLITUS WITH COMPLICATION, WITHOUT LONG-TERM CURRENT USE OF INSULIN (HCC): ICD-10-CM

## 2018-09-20 DIAGNOSIS — E03.9 HYPOTHYROIDISM, UNSPECIFIED TYPE: Primary | ICD-10-CM

## 2018-09-20 DIAGNOSIS — Z12.11 ENCOUNTER FOR SCREENING COLONOSCOPY: ICD-10-CM

## 2018-09-20 DIAGNOSIS — Z99.89 CPAP (CONTINUOUS POSITIVE AIRWAY PRESSURE) DEPENDENCE: ICD-10-CM

## 2018-09-20 DIAGNOSIS — R39.15 URINARY URGENCY: ICD-10-CM

## 2018-09-20 DIAGNOSIS — M85.80 OSTEOPENIA, UNSPECIFIED LOCATION: ICD-10-CM

## 2018-09-20 LAB
APPEARANCE FLUID: ABNORMAL
BILIRUBIN, POC: ABNORMAL
BLOOD URINE, POC: ABNORMAL
CLARITY, POC: ABNORMAL
COLOR, POC: ABNORMAL
GLUCOSE URINE, POC: 500
KETONES, POC: 15
LEUKOCYTE EST, POC: ABNORMAL
NITRITE, POC: ABNORMAL
PH, POC: 5.5
PROTEIN, POC: ABNORMAL
SPECIFIC GRAVITY, POC: 1.03
UROBILINOGEN, POC: 0.2

## 2018-09-20 PROCEDURE — 90662 IIV NO PRSV INCREASED AG IM: CPT | Performed by: FAMILY MEDICINE

## 2018-09-20 PROCEDURE — 3017F COLORECTAL CA SCREEN DOC REV: CPT | Performed by: FAMILY MEDICINE

## 2018-09-20 PROCEDURE — 99214 OFFICE O/P EST MOD 30 MIN: CPT | Performed by: FAMILY MEDICINE

## 2018-09-20 PROCEDURE — 1090F PRES/ABSN URINE INCON ASSESS: CPT | Performed by: FAMILY MEDICINE

## 2018-09-20 PROCEDURE — 4040F PNEUMOC VAC/ADMIN/RCVD: CPT | Performed by: FAMILY MEDICINE

## 2018-09-20 PROCEDURE — 1036F TOBACCO NON-USER: CPT | Performed by: FAMILY MEDICINE

## 2018-09-20 PROCEDURE — G8417 CALC BMI ABV UP PARAM F/U: HCPCS | Performed by: FAMILY MEDICINE

## 2018-09-20 PROCEDURE — G8427 DOCREV CUR MEDS BY ELIG CLIN: HCPCS | Performed by: FAMILY MEDICINE

## 2018-09-20 PROCEDURE — G8598 ASA/ANTIPLAT THER USED: HCPCS | Performed by: FAMILY MEDICINE

## 2018-09-20 PROCEDURE — 81002 URINALYSIS NONAUTO W/O SCOPE: CPT | Performed by: FAMILY MEDICINE

## 2018-09-20 PROCEDURE — G0008 ADMIN INFLUENZA VIRUS VAC: HCPCS | Performed by: FAMILY MEDICINE

## 2018-09-20 PROCEDURE — G8399 PT W/DXA RESULTS DOCUMENT: HCPCS | Performed by: FAMILY MEDICINE

## 2018-09-20 PROCEDURE — 3046F HEMOGLOBIN A1C LEVEL >9.0%: CPT | Performed by: FAMILY MEDICINE

## 2018-09-20 PROCEDURE — 1123F ACP DISCUSS/DSCN MKR DOCD: CPT | Performed by: FAMILY MEDICINE

## 2018-09-20 PROCEDURE — 1101F PT FALLS ASSESS-DOCD LE1/YR: CPT | Performed by: FAMILY MEDICINE

## 2018-09-20 PROCEDURE — 2022F DILAT RTA XM EVC RTNOPTHY: CPT | Performed by: FAMILY MEDICINE

## 2018-09-20 RX ORDER — ALENDRONATE SODIUM 70 MG/1
70 TABLET ORAL
Qty: 12 TABLET | Refills: 3 | Status: SHIPPED | OUTPATIENT
Start: 2018-09-20 | End: 2019-12-10 | Stop reason: SDUPTHER

## 2018-09-20 RX ORDER — GLIMEPIRIDE 4 MG/1
4 TABLET ORAL 2 TIMES DAILY
Qty: 30 TABLET | Refills: 1 | Status: SHIPPED | OUTPATIENT
Start: 2018-09-20 | End: 2018-09-26 | Stop reason: SDUPTHER

## 2018-09-20 ASSESSMENT — PATIENT HEALTH QUESTIONNAIRE - PHQ9
2. FEELING DOWN, DEPRESSED OR HOPELESS: 1
SUM OF ALL RESPONSES TO PHQ QUESTIONS 1-9: 1
1. LITTLE INTEREST OR PLEASURE IN DOING THINGS: 0
SUM OF ALL RESPONSES TO PHQ9 QUESTIONS 1 & 2: 1
SUM OF ALL RESPONSES TO PHQ QUESTIONS 1-9: 1

## 2018-09-20 ASSESSMENT — ENCOUNTER SYMPTOMS
SHORTNESS OF BREATH: 0
COLOR CHANGE: 0
COUGH: 0

## 2018-09-20 NOTE — PROGRESS NOTES
After obtaining consent, and per orders of Dr. Cayetano Guevara, injection of HD Flu given in Left deltoid by Hossein Cadet. Patient instructed to remain in clinic for 20 minutes afterwards, and to report any adverse reaction to me immediately.
2011/CPAP    PLMD (periodic limb movement disorder)     RLS (restless legs syndrome)        Past Surgical History:   Procedure Laterality Date    OTHER SURGICAL HISTORY      teeth extractions    PTCA      with stents    TUBAL LIGATION         Social History     Social History    Marital status:      Spouse name: N/A    Number of children: N/A    Years of education: N/A     Social History Main Topics    Smoking status: Never Smoker    Smokeless tobacco: Never Used    Alcohol use No    Drug use: No    Sexual activity: Not Asked     Other Topics Concern    None     Social History Narrative    None       Physical Exam   Constitutional: She is oriented to person, place, and time. She appears well-developed and well-nourished. No distress. HENT:   Head: Normocephalic and atraumatic. Mouth/Throat: Oropharynx is clear and moist. No oropharyngeal exudate. Eyes: Conjunctivae are normal. No scleral icterus. Neck: Normal range of motion. Neck supple. No thyromegaly present. Cardiovascular: Normal rate, regular rhythm and normal heart sounds. Exam reveals no friction rub. No murmur heard. Pulmonary/Chest: Effort normal and breath sounds normal. No respiratory distress. She has no wheezes. Abdominal: Soft. She exhibits no distension and no mass. There is no tenderness. There is no rebound and no guarding. Lymphadenopathy:     She has no cervical adenopathy. Neurological: She is alert and oriented to person, place, and time. Skin: Skin is warm and dry. No rash noted. She is not diaphoretic. Psychiatric: She has a normal mood and affect. Nursing note and vitals reviewed. Monofilament Exam Reveals:  Pulses: normal  Edema:normal  Skin Lesions:normal  Right Foot:    Left Foot:  Normal sensation at 1-10   Normal sensation at 1-10         Assessment    ICD-10-CM ICD-9-CM    1.  Hypothyroidism, unspecified type E03.9 244.9 The current medical regimen is effective;  continue present plan

## 2018-09-25 DIAGNOSIS — E11.8 TYPE 2 DIABETES MELLITUS WITH COMPLICATION, WITHOUT LONG-TERM CURRENT USE OF INSULIN (HCC): ICD-10-CM

## 2018-09-25 DIAGNOSIS — E03.9 HYPOTHYROIDISM, UNSPECIFIED TYPE: ICD-10-CM

## 2018-09-25 LAB
ALBUMIN SERPL-MCNC: 4.1 G/DL (ref 3.5–5.2)
ALP BLD-CCNC: 81 U/L (ref 35–104)
ALT SERPL-CCNC: 14 U/L (ref 5–33)
ANION GAP SERPL CALCULATED.3IONS-SCNC: 16 MMOL/L (ref 7–19)
AST SERPL-CCNC: 14 U/L (ref 5–32)
BILIRUB SERPL-MCNC: 0.4 MG/DL (ref 0.2–1.2)
BUN BLDV-MCNC: 14 MG/DL (ref 8–23)
CALCIUM SERPL-MCNC: 9.6 MG/DL (ref 8.8–10.2)
CHLORIDE BLD-SCNC: 102 MMOL/L (ref 98–111)
CHOLESTEROL, TOTAL: 190 MG/DL (ref 160–199)
CO2: 26 MMOL/L (ref 22–29)
CREAT SERPL-MCNC: 0.6 MG/DL (ref 0.5–0.9)
CREATININE URINE: 113.1 MG/DL (ref 4.2–622)
GFR NON-AFRICAN AMERICAN: >60
GLUCOSE BLD-MCNC: 174 MG/DL (ref 74–109)
HBA1C MFR BLD: 10 % (ref 4–6)
HDLC SERPL-MCNC: 65 MG/DL (ref 65–121)
LDL CHOLESTEROL CALCULATED: 93 MG/DL
MICROALBUMIN UR-MCNC: <1.2 MG/DL (ref 0–19)
MICROALBUMIN/CREAT UR-RTO: NORMAL MG/G
POTASSIUM SERPL-SCNC: 4.5 MMOL/L (ref 3.5–5)
SODIUM BLD-SCNC: 144 MMOL/L (ref 136–145)
T4 FREE: 0.8 NG/DL (ref 0.9–1.7)
TOTAL PROTEIN: 6.4 G/DL (ref 6.6–8.7)
TRIGL SERPL-MCNC: 158 MG/DL (ref 0–149)
TSH SERPL DL<=0.05 MIU/L-ACNC: 36.06 UIU/ML (ref 0.27–4.2)

## 2018-09-26 DIAGNOSIS — E11.8 TYPE 2 DIABETES MELLITUS WITH COMPLICATION, WITHOUT LONG-TERM CURRENT USE OF INSULIN (HCC): ICD-10-CM

## 2018-09-26 RX ORDER — GLIMEPIRIDE 4 MG/1
4 TABLET ORAL 2 TIMES DAILY
Qty: 60 TABLET | Refills: 1 | Status: SHIPPED | OUTPATIENT
Start: 2018-09-26 | End: 2019-01-19 | Stop reason: SDUPTHER

## 2018-10-03 RX ORDER — CLOPIDOGREL BISULFATE 75 MG/1
75 TABLET ORAL DAILY
Qty: 30 TABLET | Refills: 5 | Status: SHIPPED | OUTPATIENT
Start: 2018-10-03 | End: 2019-04-30 | Stop reason: SDUPTHER

## 2018-10-14 RX ORDER — LEVOTHYROXINE SODIUM 0.1 MG/1
TABLET ORAL
Qty: 90 TABLET | Refills: 3 | Status: SHIPPED | OUTPATIENT
Start: 2018-10-14 | End: 2019-01-21

## 2018-10-15 RX ORDER — LISINOPRIL 10 MG/1
10 TABLET ORAL DAILY
Qty: 30 TABLET | Refills: 5 | Status: SHIPPED | OUTPATIENT
Start: 2018-10-15 | End: 2019-12-10 | Stop reason: SDUPTHER

## 2018-10-17 ENCOUNTER — TELEPHONE (OUTPATIENT)
Dept: PRIMARY CARE CLINIC | Age: 73
End: 2018-10-17

## 2018-10-30 ENCOUNTER — TELEPHONE (OUTPATIENT)
Dept: GASTROENTEROLOGY | Age: 73
End: 2018-10-30

## 2018-10-31 ENCOUNTER — OFFICE VISIT (OUTPATIENT)
Dept: PRIMARY CARE CLINIC | Age: 73
End: 2018-10-31
Payer: MEDICARE

## 2018-10-31 VITALS
OXYGEN SATURATION: 98 % | SYSTOLIC BLOOD PRESSURE: 124 MMHG | HEIGHT: 62 IN | WEIGHT: 163 LBS | BODY MASS INDEX: 30 KG/M2 | HEART RATE: 63 BPM | RESPIRATION RATE: 16 BRPM | DIASTOLIC BLOOD PRESSURE: 84 MMHG

## 2018-10-31 DIAGNOSIS — Z00.00 ROUTINE GENERAL MEDICAL EXAMINATION AT A HEALTH CARE FACILITY: Primary | ICD-10-CM

## 2018-10-31 PROCEDURE — 4040F PNEUMOC VAC/ADMIN/RCVD: CPT | Performed by: FAMILY MEDICINE

## 2018-10-31 PROCEDURE — G0438 PPPS, INITIAL VISIT: HCPCS | Performed by: FAMILY MEDICINE

## 2018-10-31 PROCEDURE — G8598 ASA/ANTIPLAT THER USED: HCPCS | Performed by: FAMILY MEDICINE

## 2018-10-31 PROCEDURE — G8482 FLU IMMUNIZE ORDER/ADMIN: HCPCS | Performed by: FAMILY MEDICINE

## 2018-10-31 ASSESSMENT — ANXIETY QUESTIONNAIRES: GAD7 TOTAL SCORE: 0

## 2018-10-31 ASSESSMENT — PATIENT HEALTH QUESTIONNAIRE - PHQ9: SUM OF ALL RESPONSES TO PHQ QUESTIONS 1-9: 6

## 2018-10-31 ASSESSMENT — LIFESTYLE VARIABLES: HOW OFTEN DO YOU HAVE A DRINK CONTAINING ALCOHOL: 0

## 2018-10-31 NOTE — PATIENT INSTRUCTIONS
good cholesterol, this type of cholesterol actually carries cholesterol away from your arteries and may, therefore, help lower your risk of having a heart attack. You want this level to be high (ideally greater than 60). It is a risk to have a level less than 40. You can raise this good cholesterol by eating olive oil, canola oil, avocados, or nuts. Exercise raises this level, too. Fat    Fat is calorie dense and packs a lot of calories into a small amount of food. Even though fats should be limited due to their high calorie content, not all fats are bad. In fact, some fats are quite healthful. Fat can be broken down into four main types. The good-for-you fats are:   Monounsaturated fat  found in oils such as olive and canola, avocados, and nuts and natural nut butters; can decrease cholesterol levels, while keeping levels of HDL cholesterol high   Polyunsaturated fat  found in oils such as safflower, sunflower, soybean, corn, and sesame; can decrease total cholesterol and LDL cholesterol   Omega-3 fatty acids  particularly those found in fatty fish (such as salmon, trout, tuna, mackerel, herring, and sardines); can decrease risk of arrhythmias, decrease triglyceride levels, and slightly lower blood pressure   The fats that you want to limit are:   Saturated fat  found in animal products, many fast foods, and a few vegetables; increases total blood cholesterol, including LDL levels   Animal fats that are saturated include: butter, lard, whole-milk dairy products, meat fat, and poultry skin   Vegetable fats that are saturated include: hydrogenated shortening, palm oil, coconut oil, cocoa butter   Hydrogenated or trans fat  found in margarine and vegetable shortening, most shelf stable snack foods, and fried foods; increases LDL and decreases HDL     It is generally recommended that you limit your total fat for the day to less than 30% of your total calories.  If you follow an 1800-calorie heart healthy diet, for example, this would mean 60 grams of fat or less per day. Saturated fat and trans fat in your diet raises your blood cholesterol the most, much more than dietary cholesterol does. For this reason, on a heart-healthy diet, less than 7% of your calories should come from saturated fat and ideally 0% from trans fat. On an 1800-calorie diet, this translates into less than 14 grams of saturated fat per day, leaving 46 grams of fat to come from mono- and polyunsaturated fats.    Food Choices on a Heart Healthy Diet   Food Category   Foods Recommended   Foods to Avoid   Grains   Breads and rolls without salted tops Most dry and cooked cereals Unsalted crackers and breadsticks Low-sodium or homemade breadcrumbs or stuffing All rice and pastas   Breads, rolls, and crackers with salted tops High-fat baked goods (eg, muffins, donuts, pastries) Quick breads, self-rising flour, and biscuit mixes Regular bread crumbs Instant hot cereals Commercially prepared rice, pasta, or stuffing mixes   Vegetables   Most fresh, frozen, and low-sodium canned vegetables Low-sodium and salt-free vegetable juices Canned vegetables if unsalted or rinsed   Regular canned vegetables and juices, including sauerkraut and pickled vegetables Frozen vegetables with sauces Commercially prepared potato and vegetable mixes   Fruits   Most fresh, frozen, and canned fruits All fruit juices   Fruits processed with salt or sodium   Milk   Nonfat or low-fat (1%) milk Nonfat or low-fat yogurt Cottage cheese, low-fat ricotta, cheeses labeled as low-fat and low-sodium   Whole milk Reduced-fat (2%) milk Malted and chocolate milk Full fat yogurt Most cheeses (unless low-fat and low salt) Buttermilk (no more than 1 cup per week)   Meats and Beans   Lean cuts of fresh or frozen beef, veal, lamb, or pork (look for the word loin) Fresh or frozen poultry without the skin Fresh or frozen fish and some shellfish Egg whites and egg substitutes (Limit whole eggs to three per relaxation. Choose activities that calm you down, and make it routine. Manage Chronic Conditions    Side effects of high blood pressure , diabetes, and heart disease can interfere with mental function. Many of the lifestyle steps discussed here can help manage these conditions. Strive to eat a healthy diet, exercise regularly, get stress under control, and follow your doctor's advice for your condition. Minimize Medications    Talk to your doctor about the medicines that you take. Some may be unnecessary. Also, healthy lifestyle habits may lower the need for certain drugs.      Last Reviewed: April 2010 Suad Chou MD   Updated: 4/13/2010   ·

## 2018-10-31 NOTE — PROGRESS NOTES
Medicare Annual Wellness Visit  Name: Luisito Méndez Date: 10/31/2018   MRN: [id number] Sex: Female   Age: 68 y.o. Ethnicity: Non-/Non    : 1945 Race: Kal Gutierrez is here for No chief complaint on file. Screenings for behavioral, psychosocial and functional/safety risks, and cognitive dysfunction are all negative except as indicated below. These results, as well as other patient data from the [address] form, are documented in Flowsheets linked to this Encounter. Allergies   Allergen Reactions    Bee Venom Shortness Of Breath and Swelling    Adhesive Tape Rash       Prior to Visit Medications    Medication Sig Taking? Authorizing Provider   aspirin 81 MG tablet Take 81 mg by mouth daily Yes Historical Provider, MD   metFORMIN (GLUCOPHAGE) 1000 MG tablet TAKE 1 TABLET BY MOUTH 2 TIMES DAILY (WITH MEALS) Yes Katie Waldrop MD   lisinopril (PRINIVIL;ZESTRIL) 10 MG tablet TAKE 1 TABLET BY MOUTH DAILY Yes Katie Waldrop MD   levothyroxine (SYNTHROID) 100 MCG tablet TAKE 1 TABLET BY MOUTH EVERY DAY Yes Katie Waldrop MD   clopidogrel (PLAVIX) 75 MG tablet Take 1 tablet by mouth daily Yes Katie Waldrop MD   glimepiride (AMARYL) 4 MG tablet Take 1 tablet by mouth 2 times daily Yes Katie Waldrop MD   JARDIANCE 25 MG tablet TAKE 25 MG BY MOUTH DAILY Yes Katie Waldrop MD   alendronate (FOSAMAX) 70 MG tablet Take 1 tablet by mouth every 7 days Yes Katie Waldrop MD   venlafaxine (EFFEXOR XR) 150 MG extended release capsule TAKE 1 CAPSULE BY MOUTH EVERY DAY.  Yes Rosalba Alfonso DO   lovastatin (MEVACOR) 40 MG tablet TAKE 1 TABLET BY MOUTH AT BEDTIME Yes Rosalba Alfonso DO   vitamin D (CHOLECALCIFEROL) 1000 UNIT TABS tablet Take 1,000 Units by mouth daily Yes Historical Provider, MD   vitamin B-12 (CYANOCOBALAMIN) 1000 MCG tablet Take 5,000 mcg by mouth daily  Yes Historical Provider, MD   Pediatric

## 2018-10-31 NOTE — TELEPHONE ENCOUNTER
10-31-18 8:14 am    I tried calling this NP again to get her scheduled for an OV, again she did not answer and her VM is not set up.  Randy bhardwaj

## 2019-01-08 ENCOUNTER — OFFICE VISIT (OUTPATIENT)
Dept: PRIMARY CARE CLINIC | Age: 74
End: 2019-01-08
Payer: MEDICARE

## 2019-01-08 VITALS
TEMPERATURE: 97.9 F | HEART RATE: 77 BPM | HEIGHT: 61 IN | SYSTOLIC BLOOD PRESSURE: 120 MMHG | WEIGHT: 172.4 LBS | BODY MASS INDEX: 32.55 KG/M2 | OXYGEN SATURATION: 94 % | DIASTOLIC BLOOD PRESSURE: 62 MMHG

## 2019-01-08 DIAGNOSIS — M79.674 GREAT TOE PAIN, RIGHT: ICD-10-CM

## 2019-01-08 DIAGNOSIS — M21.611 BUNION OF GREAT TOE OF RIGHT FOOT: ICD-10-CM

## 2019-01-08 DIAGNOSIS — E11.8 TYPE 2 DIABETES MELLITUS WITH COMPLICATION, WITHOUT LONG-TERM CURRENT USE OF INSULIN (HCC): Primary | ICD-10-CM

## 2019-01-08 DIAGNOSIS — Z12.11 ENCOUNTER FOR SCREENING COLONOSCOPY: ICD-10-CM

## 2019-01-08 DIAGNOSIS — N39.41 URGE INCONTINENCE: ICD-10-CM

## 2019-01-08 DIAGNOSIS — Z23 NEED FOR PROPHYLACTIC VACCINATION AGAINST STREPTOCOCCUS PNEUMONIAE (PNEUMOCOCCUS): ICD-10-CM

## 2019-01-08 DIAGNOSIS — E03.9 HYPOTHYROIDISM, UNSPECIFIED TYPE: ICD-10-CM

## 2019-01-08 DIAGNOSIS — E11.8 TYPE 2 DIABETES MELLITUS WITH COMPLICATION, WITHOUT LONG-TERM CURRENT USE OF INSULIN (HCC): ICD-10-CM

## 2019-01-08 DIAGNOSIS — Z13.31 POSITIVE DEPRESSION SCREENING: ICD-10-CM

## 2019-01-08 LAB
ALBUMIN SERPL-MCNC: 4.2 G/DL (ref 3.5–5.2)
ALP BLD-CCNC: 69 U/L (ref 35–104)
ALT SERPL-CCNC: 21 U/L (ref 5–33)
ANION GAP SERPL CALCULATED.3IONS-SCNC: 15 MMOL/L (ref 7–19)
AST SERPL-CCNC: 16 U/L (ref 5–32)
BILIRUB SERPL-MCNC: <0.2 MG/DL (ref 0.2–1.2)
BUN BLDV-MCNC: 20 MG/DL (ref 8–23)
CALCIUM SERPL-MCNC: 10.2 MG/DL (ref 8.8–10.2)
CHLORIDE BLD-SCNC: 105 MMOL/L (ref 98–111)
CO2: 25 MMOL/L (ref 22–29)
CREAT SERPL-MCNC: 0.8 MG/DL (ref 0.5–0.9)
GFR NON-AFRICAN AMERICAN: >60
GLUCOSE BLD-MCNC: 98 MG/DL (ref 74–109)
HBA1C MFR BLD: 7.9 % (ref 4–6)
POTASSIUM SERPL-SCNC: 4.1 MMOL/L (ref 3.5–5)
SODIUM BLD-SCNC: 145 MMOL/L (ref 136–145)
T4 FREE: 0.8 NG/DL (ref 0.9–1.7)
TOTAL PROTEIN: 6.7 G/DL (ref 6.6–8.7)
TSH SERPL DL<=0.05 MIU/L-ACNC: 24.17 UIU/ML (ref 0.27–4.2)
URIC ACID, SERUM: 3.8 MG/DL (ref 2.4–5.7)

## 2019-01-08 PROCEDURE — 90732 PPSV23 VACC 2 YRS+ SUBQ/IM: CPT | Performed by: FAMILY MEDICINE

## 2019-01-08 PROCEDURE — G8399 PT W/DXA RESULTS DOCUMENT: HCPCS | Performed by: FAMILY MEDICINE

## 2019-01-08 PROCEDURE — 2022F DILAT RTA XM EVC RTNOPTHY: CPT | Performed by: FAMILY MEDICINE

## 2019-01-08 PROCEDURE — 3017F COLORECTAL CA SCREEN DOC REV: CPT | Performed by: FAMILY MEDICINE

## 2019-01-08 PROCEDURE — 1101F PT FALLS ASSESS-DOCD LE1/YR: CPT | Performed by: FAMILY MEDICINE

## 2019-01-08 PROCEDURE — G8417 CALC BMI ABV UP PARAM F/U: HCPCS | Performed by: FAMILY MEDICINE

## 2019-01-08 PROCEDURE — G8431 POS CLIN DEPRES SCRN F/U DOC: HCPCS | Performed by: FAMILY MEDICINE

## 2019-01-08 PROCEDURE — 99214 OFFICE O/P EST MOD 30 MIN: CPT | Performed by: FAMILY MEDICINE

## 2019-01-08 PROCEDURE — 0509F URINE INCON PLAN DOCD: CPT | Performed by: FAMILY MEDICINE

## 2019-01-08 PROCEDURE — 1036F TOBACCO NON-USER: CPT | Performed by: FAMILY MEDICINE

## 2019-01-08 PROCEDURE — 4040F PNEUMOC VAC/ADMIN/RCVD: CPT | Performed by: FAMILY MEDICINE

## 2019-01-08 PROCEDURE — 1123F ACP DISCUSS/DSCN MKR DOCD: CPT | Performed by: FAMILY MEDICINE

## 2019-01-08 PROCEDURE — 3045F PR MOST RECENT HEMOGLOBIN A1C LEVEL 7.0-9.0%: CPT | Performed by: FAMILY MEDICINE

## 2019-01-08 PROCEDURE — G0009 ADMIN PNEUMOCOCCAL VACCINE: HCPCS | Performed by: FAMILY MEDICINE

## 2019-01-08 PROCEDURE — G8427 DOCREV CUR MEDS BY ELIG CLIN: HCPCS | Performed by: FAMILY MEDICINE

## 2019-01-08 PROCEDURE — G8482 FLU IMMUNIZE ORDER/ADMIN: HCPCS | Performed by: FAMILY MEDICINE

## 2019-01-08 PROCEDURE — 1090F PRES/ABSN URINE INCON ASSESS: CPT | Performed by: FAMILY MEDICINE

## 2019-01-08 RX ORDER — VENLAFAXINE HYDROCHLORIDE 150 MG/1
CAPSULE, EXTENDED RELEASE ORAL
Qty: 30 CAPSULE | Refills: 5 | Status: SHIPPED | OUTPATIENT
Start: 2019-01-08 | End: 2019-06-19 | Stop reason: SDUPTHER

## 2019-01-13 ASSESSMENT — ENCOUNTER SYMPTOMS
COUGH: 0
SHORTNESS OF BREATH: 0

## 2019-01-17 ENCOUNTER — OFFICE VISIT (OUTPATIENT)
Dept: GASTROENTEROLOGY | Age: 74
End: 2019-01-17

## 2019-01-17 VITALS
HEIGHT: 61 IN | BODY MASS INDEX: 32.1 KG/M2 | OXYGEN SATURATION: 96 % | HEART RATE: 103 BPM | SYSTOLIC BLOOD PRESSURE: 100 MMHG | DIASTOLIC BLOOD PRESSURE: 60 MMHG | WEIGHT: 170 LBS

## 2019-01-17 DIAGNOSIS — Z12.11 ENCOUNTER FOR SCREENING COLONOSCOPY: Primary | ICD-10-CM

## 2019-01-17 DIAGNOSIS — Z86.010 PERSONAL HISTORY OF COLONIC POLYPS: ICD-10-CM

## 2019-01-17 PROBLEM — Z86.0100 PERSONAL HISTORY OF COLONIC POLYPS: Status: ACTIVE | Noted: 2019-01-17

## 2019-01-17 PROCEDURE — 1036F TOBACCO NON-USER: CPT | Performed by: NURSE PRACTITIONER

## 2019-01-17 PROCEDURE — G8427 DOCREV CUR MEDS BY ELIG CLIN: HCPCS | Performed by: NURSE PRACTITIONER

## 2019-01-17 PROCEDURE — 4040F PNEUMOC VAC/ADMIN/RCVD: CPT | Performed by: NURSE PRACTITIONER

## 2019-01-17 PROCEDURE — G8482 FLU IMMUNIZE ORDER/ADMIN: HCPCS | Performed by: NURSE PRACTITIONER

## 2019-01-17 PROCEDURE — 99999 PR OFFICE/OUTPT VISIT,PROCEDURE ONLY: CPT | Performed by: NURSE PRACTITIONER

## 2019-01-17 PROCEDURE — 1101F PT FALLS ASSESS-DOCD LE1/YR: CPT | Performed by: NURSE PRACTITIONER

## 2019-01-17 PROCEDURE — 3017F COLORECTAL CA SCREEN DOC REV: CPT | Performed by: NURSE PRACTITIONER

## 2019-01-17 PROCEDURE — G8417 CALC BMI ABV UP PARAM F/U: HCPCS | Performed by: NURSE PRACTITIONER

## 2019-01-17 PROCEDURE — 1090F PRES/ABSN URINE INCON ASSESS: CPT | Performed by: NURSE PRACTITIONER

## 2019-01-17 PROCEDURE — 1123F ACP DISCUSS/DSCN MKR DOCD: CPT | Performed by: NURSE PRACTITIONER

## 2019-01-17 PROCEDURE — G8399 PT W/DXA RESULTS DOCUMENT: HCPCS | Performed by: NURSE PRACTITIONER

## 2019-01-17 ASSESSMENT — ENCOUNTER SYMPTOMS
NAUSEA: 0
ABDOMINAL PAIN: 0
COUGH: 0
SORE THROAT: 0
SHORTNESS OF BREATH: 0
VOMITING: 0
BLOOD IN STOOL: 0
CONSTIPATION: 0
DIARRHEA: 0
BACK PAIN: 0
ABDOMINAL DISTENTION: 0
TROUBLE SWALLOWING: 0
RECTAL PAIN: 0
VOICE CHANGE: 0
ANAL BLEEDING: 0

## 2019-01-21 ENCOUNTER — TELEPHONE (OUTPATIENT)
Dept: PRIMARY CARE CLINIC | Age: 74
End: 2019-01-21

## 2019-01-21 RX ORDER — LEVOTHYROXINE SODIUM 112 UG/1
112 TABLET ORAL DAILY
Qty: 90 TABLET | Refills: 1 | Status: SHIPPED | OUTPATIENT
Start: 2019-01-21 | End: 2019-07-14 | Stop reason: SDUPTHER

## 2019-01-23 ENCOUNTER — TELEPHONE (OUTPATIENT)
Dept: GASTROENTEROLOGY | Age: 74
End: 2019-01-23

## 2019-02-16 PROBLEM — Z12.11 ENCOUNTER FOR SCREENING COLONOSCOPY: Status: RESOLVED | Noted: 2019-01-17 | Resolved: 2019-02-16

## 2019-04-22 ENCOUNTER — HOSPITAL ENCOUNTER (OUTPATIENT)
Dept: GENERAL RADIOLOGY | Age: 74
Discharge: HOME | End: 2019-04-22
Payer: MEDICARE

## 2019-04-22 ENCOUNTER — OFFICE VISIT (OUTPATIENT)
Dept: URGENT CARE | Age: 74
End: 2019-04-22
Payer: MEDICARE

## 2019-04-22 VITALS
SYSTOLIC BLOOD PRESSURE: 101 MMHG | OXYGEN SATURATION: 97 % | HEART RATE: 78 BPM | HEIGHT: 62 IN | BODY MASS INDEX: 33.57 KG/M2 | WEIGHT: 182.4 LBS | DIASTOLIC BLOOD PRESSURE: 60 MMHG | TEMPERATURE: 98.3 F | RESPIRATION RATE: 20 BRPM

## 2019-04-22 DIAGNOSIS — M25.572 ACUTE LEFT ANKLE PAIN: Primary | ICD-10-CM

## 2019-04-22 DIAGNOSIS — M25.572 ACUTE LEFT ANKLE PAIN: ICD-10-CM

## 2019-04-22 PROCEDURE — 4040F PNEUMOC VAC/ADMIN/RCVD: CPT | Performed by: SPECIALIST

## 2019-04-22 PROCEDURE — 1036F TOBACCO NON-USER: CPT | Performed by: SPECIALIST

## 2019-04-22 PROCEDURE — G8427 DOCREV CUR MEDS BY ELIG CLIN: HCPCS | Performed by: SPECIALIST

## 2019-04-22 PROCEDURE — G8417 CALC BMI ABV UP PARAM F/U: HCPCS | Performed by: SPECIALIST

## 2019-04-22 PROCEDURE — 1123F ACP DISCUSS/DSCN MKR DOCD: CPT | Performed by: SPECIALIST

## 2019-04-22 PROCEDURE — 3017F COLORECTAL CA SCREEN DOC REV: CPT | Performed by: SPECIALIST

## 2019-04-22 PROCEDURE — 1090F PRES/ABSN URINE INCON ASSESS: CPT | Performed by: SPECIALIST

## 2019-04-22 PROCEDURE — G8399 PT W/DXA RESULTS DOCUMENT: HCPCS | Performed by: SPECIALIST

## 2019-04-22 PROCEDURE — 99213 OFFICE O/P EST LOW 20 MIN: CPT | Performed by: SPECIALIST

## 2019-04-22 PROCEDURE — 73610 X-RAY EXAM OF ANKLE: CPT

## 2019-04-22 NOTE — PATIENT INSTRUCTIONS
Patient Education        Joint Pain: Care Instructions  Your Care Instructions    Many people have small aches and pains from overuse or injury to muscles and joints. Joint injuries often happen during sports or recreation, work tasks, or projects around the home. An overuse injury can happen when you put too much stress on a joint or when you do an activity that stresses the joint over and over, such as using the computer or rowing a boat. You can take action at home to help your muscles and joints get better. You should feel better in 1 to 2 weeks, but it can take 3 months or more to heal completely. Follow-up care is a key part of your treatment and safety. Be sure to make and go to all appointments, and call your doctor if you are having problems. It's also a good idea to know your test results and keep a list of the medicines you take. How can you care for yourself at home? · Do not put weight on the injured joint for at least a day or two. · For the first day or two after an injury, do not take hot showers or baths, and do not use hot packs. The heat could make swelling worse. · Put ice or a cold pack on the sore joint for 10 to 20 minutes at a time. Try to do this every 1 to 2 hours for the next 3 days (when you are awake) or until the swelling goes down. Put a thin cloth between the ice and your skin. · Wrap the injury in an elastic bandage. Do not wrap it too tightly because this can cause more swelling. · Prop up the sore joint on a pillow when you ice it or anytime you sit or lie down during the next 3 days. Try to keep it above the level of your heart. This will help reduce swelling. · Take an over-the-counter pain medicine, such as acetaminophen (Tylenol), ibuprofen (Advil, Motrin), or naproxen (Aleve). Read and follow all instructions on the label. · After 1 or 2 days of rest, begin moving the joint gently.  While the joint is still healing, you can begin to exercise using activities that do not strain or hurt the painful joint. When should you call for help? Call your doctor now or seek immediate medical care if:    · You have signs of infection, such as:  ? Increased pain, swelling, warmth, and redness. ? Red streaks leading from the joint. ? A fever.    Watch closely for changes in your health, and be sure to contact your doctor if:    · Your movement or symptoms are not getting better after 1 to 2 weeks of home treatment. Where can you learn more? Go to https://FSP Instruments.Stublisher. org and sign in to your [employer] account. Enter P205 in the CyrusOne box to learn more about \"Joint Pain: Care Instructions. \"     If you do not have an account, please click on the \"Sign Up Now\" link. Current as of: September 20, 2018  Content Version: 11.9  © 9887-4291 [employer], [employer]. Care instructions adapted under license by [facility] ([facility]). If you have questions about a medical condition or this instruction, always ask your healthcare professional. [address] any warranty or liability for your use of this information. Your x-ray today revealed a tiny bone fragment at the medial malleolus. You need to see an Orthopedic Surgeon for further evaluation. Wear walking boot when up. You have remove to sleep in.

## 2019-04-22 NOTE — PROGRESS NOTES
Referral put in to Ortho, Patient states that she is going out of town on vacation on May 3rd to the 10th. Spoke with Lula and going to [facility].  Disc given to patient to take to the [facility]. sh

## 2019-04-22 NOTE — PROGRESS NOTES
XR) 150 MG extended release capsule TAKE 1 CAPSULE BY MOUTH EVERY DAY. 30 capsule 5    lovastatin (MEVACOR) 40 MG tablet TAKE 1 TABLET BY MOUTH AT BEDTIME 90 tablet 3    aspirin 81 MG tablet Take 81 mg by mouth daily      lisinopril (PRINIVIL;ZESTRIL) 10 MG tablet TAKE 1 TABLET BY MOUTH DAILY 30 tablet 5    clopidogrel (PLAVIX) 75 MG tablet Take 1 tablet by mouth daily 30 tablet 5    JARDIANCE 25 MG tablet TAKE 25 MG BY MOUTH DAILY 90 tablet 5    alendronate (FOSAMAX) 70 MG tablet Take 1 tablet by mouth every 7 days 12 tablet 3    baclofen (LIORESAL) 10 MG tablet Take 1 tablet by mouth 2 times daily 30 tablet 0    vitamin D (CHOLECALCIFEROL) 1000 UNIT TABS tablet Take 1,000 Units by mouth daily      vitamin B-12 (CYANOCOBALAMIN) 1000 MCG tablet Take 5,000 mcg by mouth daily       Pediatric Multivitamins-Fl (MULT-VITAMIN/FLUORIDE PO) Take by mouth daily       metFORMIN (GLUCOPHAGE) 1000 MG tablet TAKE 1 TABLET BY MOUTH 2 TIMES DAILY (WITH MEALS) 60 tablet 5     No current facility-administered medications for this visit. Allergies   Allergen Reactions    Bee Venom Shortness Of Breath and Swelling    Adhesive Tape Rash       Health Maintenance   Topic Date Due    Diabetic retinal exam  01[birth year]16/1955    Shingles Vaccine (1 of 2) 01/16/1996    Colon cancer screen colonoscopy  01/21/2017    Diabetic foot exam  09/20/2019    Diabetic microalbuminuria test  09/25/2019    Lipid screen  09/25/2019    A1C test (Diabetic or Prediabetic)  01/08/2020    Potassium monitoring  01/08/2020    Creatinine monitoring  01/08/2020    Breast cancer screen  07/05/2020    DTaP/Tdap/Td vaccine (2 - Td) 06/09/2025    DEXA (modify frequency per FRAX score)  Completed    Flu vaccine  Completed    Pneumococcal 65+ years Vaccine  Completed    Hepatitis C screen  Completed       Subjective:     Review of Systems   Musculoskeletal: Positive for arthralgias (left ankle) and joint swelling (left ankle). Skin: Negative. OBJECTIVE:     Physical Exam   Constitutional: Vital signs are normal. She appears well-developed and well-nourished. She is cooperative. HENT:   Head: Normocephalic and atraumatic. Musculoskeletal:        Left ankle: She exhibits decreased range of motion and swelling. Tenderness. Medial malleolus tenderness found. Achilles tendon exhibits pain. Neurological: She is alert. Skin: Skin is warm, dry and intact. Psychiatric: She has a normal mood and affect. Her speech is normal and behavior is normal. Thought content normal.   Nursing note and vitals reviewed. /60   Pulse 78   Temp 98.3 °F (36.8 °C)   Resp 20   Ht 5' 2\" (1.575 m)   Wt 182 lb 6.4 oz (82.7 kg)   LMP  (LMP Unknown)   SpO2 97%   BMI 33.36 kg/m²     ASSESSMENT:       Diagnosis Orders   1. Acute left ankle pain  XR ANKLE LEFT (MIN 3 VIEWS)     1. Tiny corticated bone fragment adjacent to the medial malleolus is   likely a tiny old injury. No acute appearing ankle fracture is seen. 2. Calcaneal spurring. 3. Soft tissue swelling. Vascular calcification. Signed by Dr García Thompson on 4/22/2019 2:15 PM       PLAN:      Orders Placed This Encounter   Procedures    XR ANKLE LEFT (MIN 3 VIEWS)     Standing Status:   Future     Number of Occurrences:   1     Standing Expiration Date:   4/22/2020     Order Specific Question:   Reason for exam:     Answer:   left ankle pain and swelling       Return if symptoms worsen or fail to improve. Orders Placed This Encounter   Procedures    XR ANKLE LEFT (MIN 3 VIEWS)     Standing Status:   Future     Number of Occurrences:   1     Standing Expiration Date:   4/22/2020     Order Specific Question:   Reason for exam:     Answer:   left ankle pain and swelling     No orders of the defined types were placed in this encounter. Patient given educationalmaterials - see patient instructions. Discussed use, benefit, and side effects of prescribed medications.   All patient questions answered. Pt voiced understanding. Patient agreed with treatment plan. Follow up as directed. Patient Instructions       Patient Education        Joint Pain: Care Instructions  Your Care Instructions    Many people have small aches and pains from overuse or injury to muscles and joints. Joint injuries often happen during sports or recreation, work tasks, or projects around the home. An overuse injury can happen when you put too much stress on a joint or when you do an activity that stresses the joint over and over, such as using the computer or rowing a boat. You can take action at home to help your muscles and joints get better. You should feel better in 1 to 2 weeks, but it can take 3 months or more to heal completely. Follow-up care is a key part of your treatment and safety. Be sure to make and go to all appointments, and call your doctor if you are having problems. It's also a good idea to know your test results and keep a list of the medicines you take. How can you care for yourself at home? · Do not put weight on the injured joint for at least a day or two. · For the first day or two after an injury, do not take hot showers or baths, and do not use hot packs. The heat could make swelling worse. · Put ice or a cold pack on the sore joint for 10 to 20 minutes at a time. Try to do this every 1 to 2 hours for the next 3 days (when you are awake) or until the swelling goes down. Put a thin cloth between the ice and your skin. · Wrap the injury in an elastic bandage. Do not wrap it too tightly because this can cause more swelling. · Prop up the sore joint on a pillow when you ice it or anytime you sit or lie down during the next 3 days. Try to keep it above the level of your heart. This will help reduce swelling. · Take an over-the-counter pain medicine, such as acetaminophen (Tylenol), ibuprofen (Advil, Motrin), or naproxen (Aleve). Read and follow all instructions on the label.   · After 1 or 2 days of rest, begin moving the joint gently. While the joint is still healing, you can begin to exercise using activities that do not strain or hurt the painful joint. When should you call for help? Call your doctor now or seek immediate medical care if:    · You have signs of infection, such as:  ? Increased pain, swelling, warmth, and redness. ? Red streaks leading from the joint. ? A fever.    Watch closely for changes in your health, and be sure to contact your doctor if:    · Your movement or symptoms are not getting better after 1 to 2 weeks of home treatment. Where can you learn more? Go to https://Sports Shop TVpepiceweb.[employer].. org and sign in to your [employer] account. Enter P205 in the GameMaki box to learn more about \"Joint Pain: Care Instructions. \"     If you do not have an account, please click on the \"Sign Up Now\" link. Current as of: September 20, 2018  Content Version: 11.9  © 9580-5358 [employer]. Care instructions adapted under license by [facility] ([facility]). If you have questions about a medical condition or this instruction, always ask your healthcare professional. [place] [address] any warranty or liability for your use of this information. Your x-ray today revealed a tiny bone fragment at the medial malleolus. You need to see an Orthopedic Surgeon for further evaluation. Wear walking boot when up. You have remove to sleep in.             Electronically signed by MIN Trammell NP on 4/22/2019 at 5:04 PM

## 2019-04-30 RX ORDER — CLOPIDOGREL BISULFATE 75 MG/1
TABLET ORAL
Qty: 30 TABLET | Refills: 5 | Status: SHIPPED | OUTPATIENT
Start: 2019-04-30

## 2019-06-19 DIAGNOSIS — Z13.31 POSITIVE DEPRESSION SCREENING: Primary | ICD-10-CM

## 2019-06-19 RX ORDER — VENLAFAXINE HYDROCHLORIDE 150 MG/1
CAPSULE, EXTENDED RELEASE ORAL
Qty: 90 CAPSULE | Refills: 1 | Status: SHIPPED | OUTPATIENT
Start: 2019-06-19 | End: 2020-06-17 | Stop reason: SDUPTHER

## 2019-06-19 NOTE — TELEPHONE ENCOUNTER
Pt pharmacy sent fax requesting 90 day supply for this patient. I have submitted 90 day supply to pharmacy.

## 2019-07-24 ENCOUNTER — ANESTHESIA EVENT (OUTPATIENT)
Dept: OPERATING ROOM | Age: 74
End: 2019-07-24

## 2019-07-25 ENCOUNTER — ANESTHESIA (OUTPATIENT)
Dept: OPERATING ROOM | Age: 74
End: 2019-07-25

## 2019-07-25 ENCOUNTER — APPOINTMENT (OUTPATIENT)
Dept: OPERATING ROOM | Age: 74
End: 2019-07-25

## 2019-07-25 ENCOUNTER — HOSPITAL ENCOUNTER (OUTPATIENT)
Age: 74
Setting detail: OUTPATIENT SURGERY
Discharge: HOME | End: 2019-07-25
Attending: INTERNAL MEDICINE | Admitting: INTERNAL MEDICINE
Payer: MEDICARE

## 2019-07-25 VITALS
BODY MASS INDEX: 33.13 KG/M2 | HEIGHT: 62 IN | OXYGEN SATURATION: 97 % | HEART RATE: 73 BPM | SYSTOLIC BLOOD PRESSURE: 98 MMHG | RESPIRATION RATE: 18 BRPM | DIASTOLIC BLOOD PRESSURE: 62 MMHG | WEIGHT: 180 LBS

## 2019-07-25 VITALS — DIASTOLIC BLOOD PRESSURE: 64 MMHG | SYSTOLIC BLOOD PRESSURE: 93 MMHG | OXYGEN SATURATION: 97 %

## 2019-07-25 PROCEDURE — G0105 COLORECTAL SCRN; HI RISK IND: HCPCS

## 2019-07-25 PROCEDURE — G0121 COLON CA SCRN NOT HI RSK IND: HCPCS | Performed by: INTERNAL MEDICINE

## 2019-07-25 RX ORDER — SODIUM CHLORIDE 9 MG/ML
INJECTION, SOLUTION INTRAVENOUS CONTINUOUS
Status: DISCONTINUED | OUTPATIENT
Start: 2019-07-25 | End: 2019-07-25 | Stop reason: HOSPADM

## 2019-07-25 RX ORDER — PROPOFOL 10 MG/ML
INJECTION, EMULSION INTRAVENOUS PRN
Status: DISCONTINUED | OUTPATIENT
Start: 2019-07-25 | End: 2019-07-25 | Stop reason: SDUPTHER

## 2019-07-25 RX ORDER — LIDOCAINE HYDROCHLORIDE 10 MG/ML
INJECTION, SOLUTION INFILTRATION; PERINEURAL PRN
Status: DISCONTINUED | OUTPATIENT
Start: 2019-07-25 | End: 2019-07-25 | Stop reason: SDUPTHER

## 2019-07-25 RX ADMIN — PROPOFOL 250 MG: 10 INJECTION, EMULSION INTRAVENOUS at 10:59

## 2019-07-25 RX ADMIN — SODIUM CHLORIDE: 9 INJECTION, SOLUTION INTRAVENOUS at 10:23

## 2019-07-25 RX ADMIN — LIDOCAINE HYDROCHLORIDE 40 MG: 10 INJECTION, SOLUTION INFILTRATION; PERINEURAL at 10:59

## 2019-07-25 NOTE — H&P
presence of Dr. Blanca Amaya MD.  Electronically signed by Blanca Matias RN on 7/25/2019 at 10:08 AM    I personally performed the services described in this documentation as scribed by Danna Mcnally, and it appears accurate and complete.      Blanca Amaya MD  7/25/2019

## 2019-09-29 PROBLEM — Z12.11 SCREENING FOR COLON CANCER: Status: RESOLVED | Noted: 2019-01-17 | Resolved: 2019-09-29

## 2019-11-13 ENCOUNTER — OFFICE VISIT (OUTPATIENT)
Dept: PRIMARY CARE CLINIC | Age: 74
End: 2019-11-13
Payer: MEDICARE

## 2019-11-13 VITALS
DIASTOLIC BLOOD PRESSURE: 72 MMHG | OXYGEN SATURATION: 93 % | HEIGHT: 62 IN | HEART RATE: 78 BPM | TEMPERATURE: 97.8 F | BODY MASS INDEX: 31.83 KG/M2 | WEIGHT: 173 LBS | SYSTOLIC BLOOD PRESSURE: 124 MMHG

## 2019-11-13 DIAGNOSIS — Z12.31 ENCOUNTER FOR SCREENING MAMMOGRAM FOR BREAST CANCER: ICD-10-CM

## 2019-11-13 DIAGNOSIS — E03.9 ACQUIRED HYPOTHYROIDISM: ICD-10-CM

## 2019-11-13 DIAGNOSIS — E11.9 TYPE 2 DIABETES MELLITUS WITHOUT COMPLICATION, WITHOUT LONG-TERM CURRENT USE OF INSULIN (HCC): ICD-10-CM

## 2019-11-13 DIAGNOSIS — I10 ESSENTIAL HYPERTENSION: ICD-10-CM

## 2019-11-13 DIAGNOSIS — R32 URINARY INCONTINENCE, UNSPECIFIED TYPE: ICD-10-CM

## 2019-11-13 DIAGNOSIS — Z00.00 ROUTINE GENERAL MEDICAL EXAMINATION AT A HEALTH CARE FACILITY: Primary | ICD-10-CM

## 2019-11-13 PROCEDURE — G0439 PPPS, SUBSEQ VISIT: HCPCS | Performed by: NURSE PRACTITIONER

## 2019-11-13 PROCEDURE — G8399 PT W/DXA RESULTS DOCUMENT: HCPCS | Performed by: NURSE PRACTITIONER

## 2019-11-13 PROCEDURE — G8427 DOCREV CUR MEDS BY ELIG CLIN: HCPCS | Performed by: NURSE PRACTITIONER

## 2019-11-13 PROCEDURE — 3017F COLORECTAL CA SCREEN DOC REV: CPT | Performed by: NURSE PRACTITIONER

## 2019-11-13 PROCEDURE — 1123F ACP DISCUSS/DSCN MKR DOCD: CPT | Performed by: NURSE PRACTITIONER

## 2019-11-13 PROCEDURE — G8482 FLU IMMUNIZE ORDER/ADMIN: HCPCS | Performed by: NURSE PRACTITIONER

## 2019-11-13 PROCEDURE — 0509F URINE INCON PLAN DOCD: CPT | Performed by: NURSE PRACTITIONER

## 2019-11-13 PROCEDURE — G8417 CALC BMI ABV UP PARAM F/U: HCPCS | Performed by: NURSE PRACTITIONER

## 2019-11-13 PROCEDURE — 1090F PRES/ABSN URINE INCON ASSESS: CPT | Performed by: NURSE PRACTITIONER

## 2019-11-13 PROCEDURE — 1036F TOBACCO NON-USER: CPT | Performed by: NURSE PRACTITIONER

## 2019-11-13 PROCEDURE — 90653 IIV ADJUVANT VACCINE IM: CPT | Performed by: NURSE PRACTITIONER

## 2019-11-13 PROCEDURE — 4040F PNEUMOC VAC/ADMIN/RCVD: CPT | Performed by: NURSE PRACTITIONER

## 2019-11-13 PROCEDURE — 99213 OFFICE O/P EST LOW 20 MIN: CPT | Performed by: NURSE PRACTITIONER

## 2019-11-13 PROCEDURE — 2022F DILAT RTA XM EVC RTNOPTHY: CPT | Performed by: NURSE PRACTITIONER

## 2019-11-13 PROCEDURE — G0008 ADMIN INFLUENZA VIRUS VAC: HCPCS | Performed by: NURSE PRACTITIONER

## 2019-11-13 ASSESSMENT — ENCOUNTER SYMPTOMS
COUGH: 0
PHOTOPHOBIA: 0
NAUSEA: 0
VOICE CHANGE: 0
COLOR CHANGE: 0
RHINORRHEA: 0
BACK PAIN: 0
SHORTNESS OF BREATH: 0
VOMITING: 0

## 2019-11-13 ASSESSMENT — PATIENT HEALTH QUESTIONNAIRE - PHQ9
SUM OF ALL RESPONSES TO PHQ QUESTIONS 1-9: 0
SUM OF ALL RESPONSES TO PHQ QUESTIONS 1-9: 0

## 2019-11-13 ASSESSMENT — LIFESTYLE VARIABLES: HOW OFTEN DO YOU HAVE A DRINK CONTAINING ALCOHOL: 0

## 2019-11-20 ENCOUNTER — TELEPHONE (OUTPATIENT)
Dept: PRIMARY CARE CLINIC | Age: 74
End: 2019-11-20

## 2019-11-20 DIAGNOSIS — E11.9 TYPE 2 DIABETES MELLITUS WITHOUT COMPLICATION, WITHOUT LONG-TERM CURRENT USE OF INSULIN (HCC): Primary | ICD-10-CM

## 2019-11-20 DIAGNOSIS — E03.9 ACQUIRED HYPOTHYROIDISM: ICD-10-CM

## 2019-11-20 DIAGNOSIS — Z00.00 ROUTINE GENERAL MEDICAL EXAMINATION AT A HEALTH CARE FACILITY: ICD-10-CM

## 2019-11-20 DIAGNOSIS — E11.9 TYPE 2 DIABETES MELLITUS WITHOUT COMPLICATION, WITHOUT LONG-TERM CURRENT USE OF INSULIN (HCC): ICD-10-CM

## 2019-11-20 LAB
ALBUMIN SERPL-MCNC: 4.4 G/DL (ref 3.5–5.2)
ALP BLD-CCNC: 67 U/L (ref 35–104)
ALT SERPL-CCNC: 12 U/L (ref 5–33)
ANION GAP SERPL CALCULATED.3IONS-SCNC: 17 MMOL/L (ref 7–19)
AST SERPL-CCNC: 15 U/L (ref 5–32)
BASOPHILS ABSOLUTE: 0.1 K/UL (ref 0–0.2)
BASOPHILS RELATIVE PERCENT: 1 % (ref 0–1)
BILIRUB SERPL-MCNC: 0.7 MG/DL (ref 0.2–1.2)
BUN BLDV-MCNC: 21 MG/DL (ref 8–23)
CALCIUM SERPL-MCNC: 9.7 MG/DL (ref 8.8–10.2)
CHLORIDE BLD-SCNC: 104 MMOL/L (ref 98–111)
CHOLESTEROL, TOTAL: 156 MG/DL (ref 160–199)
CO2: 21 MMOL/L (ref 22–29)
CREAT SERPL-MCNC: 0.7 MG/DL (ref 0.5–0.9)
CREATININE URINE: 83.2 MG/DL (ref 4.2–622)
EOSINOPHILS ABSOLUTE: 0.3 K/UL (ref 0–0.6)
EOSINOPHILS RELATIVE PERCENT: 2.9 % (ref 0–5)
GFR NON-AFRICAN AMERICAN: >60
GLUCOSE BLD-MCNC: 168 MG/DL (ref 74–109)
HBA1C MFR BLD: 8.2 % (ref 4–6)
HCT VFR BLD CALC: 44.6 % (ref 37–47)
HDLC SERPL-MCNC: 61 MG/DL (ref 65–121)
HEMOGLOBIN: 14.2 G/DL (ref 12–16)
IMMATURE GRANULOCYTES #: 0 K/UL
LDL CHOLESTEROL CALCULATED: 59 MG/DL
LYMPHOCYTES ABSOLUTE: 2.5 K/UL (ref 1.1–4.5)
LYMPHOCYTES RELATIVE PERCENT: 29 % (ref 20–40)
MCH RBC QN AUTO: 30.1 PG (ref 27–31)
MCHC RBC AUTO-ENTMCNC: 31.8 G/DL (ref 33–37)
MCV RBC AUTO: 94.5 FL (ref 81–99)
MICROALBUMIN UR-MCNC: <1.2 MG/DL (ref 0–19)
MICROALBUMIN/CREAT UR-RTO: NORMAL MG/G
MONOCYTES ABSOLUTE: 0.6 K/UL (ref 0–0.9)
MONOCYTES RELATIVE PERCENT: 7.1 % (ref 0–10)
NEUTROPHILS ABSOLUTE: 5.2 K/UL (ref 1.5–7.5)
NEUTROPHILS RELATIVE PERCENT: 59.5 % (ref 50–65)
PDW BLD-RTO: 13.5 % (ref 11.5–14.5)
PLATELET # BLD: 285 K/UL (ref 130–400)
PMV BLD AUTO: 10.8 FL (ref 9.4–12.3)
POTASSIUM SERPL-SCNC: 4 MMOL/L (ref 3.5–5)
RBC # BLD: 4.72 M/UL (ref 4.2–5.4)
SODIUM BLD-SCNC: 142 MMOL/L (ref 136–145)
TOTAL PROTEIN: 7 G/DL (ref 6.6–8.7)
TRIGL SERPL-MCNC: 178 MG/DL (ref 0–149)
TSH SERPL DL<=0.05 MIU/L-ACNC: 29.71 UIU/ML (ref 0.27–4.2)
WBC # BLD: 8.7 K/UL (ref 4.8–10.8)

## 2019-11-20 RX ORDER — LEVOTHYROXINE SODIUM 0.12 MG/1
125 TABLET ORAL DAILY
Qty: 30 TABLET | Refills: 5 | Status: SHIPPED | OUTPATIENT
Start: 2019-11-20 | End: 2020-05-18

## 2019-11-21 ENCOUNTER — HOSPITAL ENCOUNTER (OUTPATIENT)
Dept: WOMENS IMAGING | Age: 74
Discharge: HOME | End: 2019-11-21
Payer: MEDICARE

## 2019-11-21 DIAGNOSIS — Z12.31 ENCOUNTER FOR SCREENING MAMMOGRAM FOR BREAST CANCER: ICD-10-CM

## 2019-11-21 PROCEDURE — 77063 BREAST TOMOSYNTHESIS BI: CPT

## 2019-11-26 ENCOUNTER — TELEPHONE (OUTPATIENT)
Dept: PRIMARY CARE CLINIC | Age: 74
End: 2019-11-26

## 2019-11-26 DIAGNOSIS — E03.9 ACQUIRED HYPOTHYROIDISM: Primary | ICD-10-CM

## 2019-12-10 DIAGNOSIS — I10 ESSENTIAL HYPERTENSION: Primary | ICD-10-CM

## 2019-12-10 DIAGNOSIS — M85.80 OSTEOPENIA, UNSPECIFIED LOCATION: ICD-10-CM

## 2019-12-10 RX ORDER — LISINOPRIL 10 MG/1
10 TABLET ORAL DAILY
Qty: 30 TABLET | Refills: 5 | Status: SHIPPED | OUTPATIENT
Start: 2019-12-10 | End: 2020-06-10

## 2019-12-10 RX ORDER — ALENDRONATE SODIUM 70 MG/1
70 TABLET ORAL
Qty: 12 TABLET | Refills: 3 | Status: SHIPPED | OUTPATIENT
Start: 2019-12-10 | End: 2020-11-10

## 2020-05-18 RX ORDER — LEVOTHYROXINE SODIUM 0.12 MG/1
TABLET ORAL
Qty: 90 TABLET | Refills: 1 | Status: SHIPPED | OUTPATIENT
Start: 2020-05-18 | End: 2020-07-22 | Stop reason: ALTCHOICE

## 2020-06-10 RX ORDER — LISINOPRIL 10 MG/1
TABLET ORAL
Qty: 90 TABLET | Refills: 1 | Status: SHIPPED | OUTPATIENT
Start: 2020-06-10 | End: 2020-11-18 | Stop reason: SDUPTHER

## 2020-06-17 RX ORDER — VENLAFAXINE HYDROCHLORIDE 150 MG/1
CAPSULE, EXTENDED RELEASE ORAL
Qty: 90 CAPSULE | Refills: 1 | Status: SHIPPED | OUTPATIENT
Start: 2020-06-17 | End: 2020-11-18 | Stop reason: SDUPTHER

## 2020-06-18 ENCOUNTER — VIRTUAL VISIT (OUTPATIENT)
Dept: PRIMARY CARE CLINIC | Age: 75
End: 2020-06-18
Payer: MEDICARE

## 2020-06-18 PROCEDURE — 99214 OFFICE O/P EST MOD 30 MIN: CPT | Performed by: NURSE PRACTITIONER

## 2020-06-18 RX ORDER — CYCLOBENZAPRINE HCL 10 MG
10 TABLET ORAL 3 TIMES DAILY PRN
Qty: 90 TABLET | Refills: 0 | Status: SHIPPED | OUTPATIENT
Start: 2020-06-18 | End: 2020-07-18

## 2020-06-18 ASSESSMENT — ENCOUNTER SYMPTOMS
RHINORRHEA: 0
PHOTOPHOBIA: 0
VOMITING: 0
SHORTNESS OF BREATH: 0
BACK PAIN: 0
COUGH: 0
VOICE CHANGE: 0
COLOR CHANGE: 0
NAUSEA: 0

## 2020-06-18 NOTE — PROGRESS NOTES
for dizziness, speech difficulty and headaches. Hematological: Does not bruise/bleed easily. Psychiatric/Behavioral: Negative for sleep disturbance and suicidal ideas. Patient location: home    PLAN    Details of this discussion including any medical advice provided:     1. Encounter for breast cancer screening other than mammogram    - [facility] DIGITAL SCREEN W OR WO CAD BILATERAL; Future    2. Acquired hypothyroidism    - Comprehensive Metabolic Panel; Future  - CBC Auto Differential; Future  - Lipid Panel; Future  - Hemoglobin A1C; Future  - Vitamin D 25 Hydroxy; Future  - TSH without Reflex; Future    3. Anxiety and depression    - Comprehensive Metabolic Panel; Future  - CBC Auto Differential; Future  - Lipid Panel; Future  - Hemoglobin A1C; Future  - Vitamin D 25 Hydroxy; Future  - TSH without Reflex; Future    4. Sciatica, unspecified laterality    - cyclobenzaprine (FLEXERIL) 10 MG tablet; Take 1 tablet by mouth 3 times daily as needed for Muscle spasms  Dispense: 90 tablet; Refill: 0       I affirm this is a Patient Initiated Episode with an Established Patient who has not had a related appointment within my department in the past 7 days or scheduled within the next 24 hours. Total Time: minutes: 21-30 minutes      Note: not billable if this call serves to triage the patient into an appointment for the relevant concern      [address] to the emergency declaration under the [facility] [address] waiver authority and the [employer] Act, this Virtual  Visit was conducted, with patient's consent, to reduce the patient's risk of exposure to COVID-19 and provide continuity of care for an established patient.

## 2020-07-17 DIAGNOSIS — F32.A ANXIETY AND DEPRESSION: ICD-10-CM

## 2020-07-17 DIAGNOSIS — F41.9 ANXIETY AND DEPRESSION: ICD-10-CM

## 2020-07-17 DIAGNOSIS — E03.9 ACQUIRED HYPOTHYROIDISM: ICD-10-CM

## 2020-07-17 LAB
ALBUMIN SERPL-MCNC: 4.5 G/DL (ref 3.5–5.2)
ALP BLD-CCNC: 64 U/L (ref 35–104)
ALT SERPL-CCNC: 16 U/L (ref 5–33)
ANION GAP SERPL CALCULATED.3IONS-SCNC: 15 MMOL/L (ref 7–19)
AST SERPL-CCNC: 18 U/L (ref 5–32)
BASOPHILS ABSOLUTE: 0.1 K/UL (ref 0–0.2)
BASOPHILS RELATIVE PERCENT: 1.3 % (ref 0–1)
BILIRUB SERPL-MCNC: 0.5 MG/DL (ref 0.2–1.2)
BUN BLDV-MCNC: 15 MG/DL (ref 8–23)
CALCIUM SERPL-MCNC: 9.1 MG/DL (ref 8.8–10.2)
CHLORIDE BLD-SCNC: 104 MMOL/L (ref 98–111)
CHOLESTEROL, TOTAL: 158 MG/DL (ref 160–199)
CO2: 24 MMOL/L (ref 22–29)
CREAT SERPL-MCNC: 0.8 MG/DL (ref 0.5–0.9)
EOSINOPHILS ABSOLUTE: 0.2 K/UL (ref 0–0.6)
EOSINOPHILS RELATIVE PERCENT: 2.8 % (ref 0–5)
GFR AFRICAN AMERICAN: >59
GFR NON-AFRICAN AMERICAN: >60
GLUCOSE BLD-MCNC: 146 MG/DL (ref 74–109)
HBA1C MFR BLD: 7.3 % (ref 4–6)
HCT VFR BLD CALC: 44 % (ref 37–47)
HDLC SERPL-MCNC: 57 MG/DL (ref 65–121)
HEMOGLOBIN: 14.7 G/DL (ref 12–16)
IMMATURE GRANULOCYTES #: 0 K/UL
LDL CHOLESTEROL CALCULATED: 64 MG/DL
LYMPHOCYTES ABSOLUTE: 2.4 K/UL (ref 1.1–4.5)
LYMPHOCYTES RELATIVE PERCENT: 38.8 % (ref 20–40)
MCH RBC QN AUTO: 30.9 PG (ref 27–31)
MCHC RBC AUTO-ENTMCNC: 33.4 G/DL (ref 33–37)
MCV RBC AUTO: 92.4 FL (ref 81–99)
MONOCYTES ABSOLUTE: 0.4 K/UL (ref 0–0.9)
MONOCYTES RELATIVE PERCENT: 6.5 % (ref 0–10)
NEUTROPHILS ABSOLUTE: 3.1 K/UL (ref 1.5–7.5)
NEUTROPHILS RELATIVE PERCENT: 50.1 % (ref 50–65)
PDW BLD-RTO: 13.4 % (ref 11.5–14.5)
PLATELET # BLD: 250 K/UL (ref 130–400)
PMV BLD AUTO: 11.1 FL (ref 9.4–12.3)
POTASSIUM SERPL-SCNC: 4 MMOL/L (ref 3.5–5)
RBC # BLD: 4.76 M/UL (ref 4.2–5.4)
SODIUM BLD-SCNC: 143 MMOL/L (ref 136–145)
TOTAL PROTEIN: 6.5 G/DL (ref 6.6–8.7)
TRIGL SERPL-MCNC: 184 MG/DL (ref 0–149)
TSH SERPL DL<=0.05 MIU/L-ACNC: 7.3 UIU/ML (ref 0.27–4.2)
VITAMIN D 25-HYDROXY: 31.9 NG/ML
WBC # BLD: 6.1 K/UL (ref 4.8–10.8)

## 2020-07-22 ENCOUNTER — TELEPHONE (OUTPATIENT)
Dept: PRIMARY CARE CLINIC | Age: 75
End: 2020-07-22

## 2020-07-22 RX ORDER — LEVOTHYROXINE SODIUM 137 UG/1
137 TABLET ORAL DAILY
Qty: 90 TABLET | Refills: 1 | Status: SHIPPED | OUTPATIENT
Start: 2020-07-22 | End: 2020-11-18 | Stop reason: SDUPTHER

## 2020-07-22 NOTE — TELEPHONE ENCOUNTER
----- Message from MIN Hamlin sent at 7/21/2020  1:08 PM CDT -----  Labs are stable. TSH is improving but I would recommend increasing synthroid to 137. 5mcg and be sure she understands how to take this medication (first thing in morning 1 hour before eating or any other meds). A1C has also improved!

## 2020-07-22 NOTE — TELEPHONE ENCOUNTER
Called patient,notified of lab results  results as follows: All questions answered Labs are stable. TSH is improving but I would recommend increasing synthroid to 137. 5mcg and be sure she understands how to take this medication (first thing in morning 1 hour before eating or any other meds). A1C has also improved!      New rx sent to pharmacy and patient reports she never use to take medication first thing in morning but does now since her last office visit

## 2020-09-03 ENCOUNTER — TELEPHONE (OUTPATIENT)
Dept: PRIMARY CARE CLINIC | Age: 75
End: 2020-09-03

## 2020-09-03 ENCOUNTER — VIRTUAL VISIT (OUTPATIENT)
Dept: PRIMARY CARE CLINIC | Age: 75
End: 2020-09-03
Payer: MEDICARE

## 2020-09-03 PROCEDURE — G8417 CALC BMI ABV UP PARAM F/U: HCPCS | Performed by: NURSE PRACTITIONER

## 2020-09-03 PROCEDURE — 3017F COLORECTAL CA SCREEN DOC REV: CPT | Performed by: NURSE PRACTITIONER

## 2020-09-03 PROCEDURE — 99213 OFFICE O/P EST LOW 20 MIN: CPT | Performed by: NURSE PRACTITIONER

## 2020-09-03 PROCEDURE — G8427 DOCREV CUR MEDS BY ELIG CLIN: HCPCS | Performed by: NURSE PRACTITIONER

## 2020-09-03 PROCEDURE — 1090F PRES/ABSN URINE INCON ASSESS: CPT | Performed by: NURSE PRACTITIONER

## 2020-09-03 PROCEDURE — 4040F PNEUMOC VAC/ADMIN/RCVD: CPT | Performed by: NURSE PRACTITIONER

## 2020-09-03 PROCEDURE — 1036F TOBACCO NON-USER: CPT | Performed by: NURSE PRACTITIONER

## 2020-09-03 PROCEDURE — G8399 PT W/DXA RESULTS DOCUMENT: HCPCS | Performed by: NURSE PRACTITIONER

## 2020-09-03 PROCEDURE — 1123F ACP DISCUSS/DSCN MKR DOCD: CPT | Performed by: NURSE PRACTITIONER

## 2020-09-03 ASSESSMENT — ENCOUNTER SYMPTOMS
VOMITING: 0
PHOTOPHOBIA: 0
RHINORRHEA: 0
COUGH: 0
NAUSEA: 0
SHORTNESS OF BREATH: 0
VOICE CHANGE: 0
BACK PAIN: 0
COLOR CHANGE: 0

## 2020-09-03 NOTE — TELEPHONE ENCOUNTER
Kenny Salas requests that a nurse return their call. Having burning and pain in breast. The best time to reach her is Anytime. Thank you.

## 2020-09-08 ENCOUNTER — HOSPITAL ENCOUNTER (OUTPATIENT)
Dept: WOMENS IMAGING | Age: 75
Discharge: HOME | End: 2020-09-08
Payer: MEDICARE

## 2020-09-08 PROCEDURE — G0279 TOMOSYNTHESIS, MAMMO: HCPCS

## 2020-09-08 PROCEDURE — 76642 ULTRASOUND BREAST LIMITED: CPT

## 2020-11-10 RX ORDER — ALENDRONATE SODIUM 70 MG/1
TABLET ORAL
Qty: 12 TABLET | Refills: 3 | Status: SHIPPED | OUTPATIENT
Start: 2020-11-10 | End: 2021-10-25

## 2020-11-18 ENCOUNTER — OFFICE VISIT (OUTPATIENT)
Dept: PRIMARY CARE CLINIC | Age: 75
End: 2020-11-18
Payer: MEDICARE

## 2020-11-18 VITALS
DIASTOLIC BLOOD PRESSURE: 68 MMHG | TEMPERATURE: 97.2 F | WEIGHT: 175 LBS | OXYGEN SATURATION: 98 % | HEART RATE: 78 BPM | HEIGHT: 62 IN | SYSTOLIC BLOOD PRESSURE: 118 MMHG | BODY MASS INDEX: 32.2 KG/M2

## 2020-11-18 PROCEDURE — 1123F ACP DISCUSS/DSCN MKR DOCD: CPT | Performed by: NURSE PRACTITIONER

## 2020-11-18 PROCEDURE — 3051F HG A1C>EQUAL 7.0%<8.0%: CPT | Performed by: NURSE PRACTITIONER

## 2020-11-18 PROCEDURE — G0008 ADMIN INFLUENZA VIRUS VAC: HCPCS | Performed by: NURSE PRACTITIONER

## 2020-11-18 PROCEDURE — 3017F COLORECTAL CA SCREEN DOC REV: CPT | Performed by: NURSE PRACTITIONER

## 2020-11-18 PROCEDURE — G8484 FLU IMMUNIZE NO ADMIN: HCPCS | Performed by: NURSE PRACTITIONER

## 2020-11-18 PROCEDURE — 4040F PNEUMOC VAC/ADMIN/RCVD: CPT | Performed by: NURSE PRACTITIONER

## 2020-11-18 PROCEDURE — G0439 PPPS, SUBSEQ VISIT: HCPCS | Performed by: NURSE PRACTITIONER

## 2020-11-18 PROCEDURE — 90694 VACC AIIV4 NO PRSRV 0.5ML IM: CPT | Performed by: NURSE PRACTITIONER

## 2020-11-18 RX ORDER — LEVOTHYROXINE SODIUM 137 UG/1
137 TABLET ORAL DAILY
Qty: 90 TABLET | Refills: 1 | Status: SHIPPED | OUTPATIENT
Start: 2020-11-18 | End: 2021-03-03 | Stop reason: SDUPTHER

## 2020-11-18 RX ORDER — VENLAFAXINE HYDROCHLORIDE 150 MG/1
CAPSULE, EXTENDED RELEASE ORAL
Qty: 90 CAPSULE | Refills: 1 | Status: SHIPPED | OUTPATIENT
Start: 2020-11-18 | End: 2021-06-13

## 2020-11-18 RX ORDER — GLIMEPIRIDE 4 MG/1
TABLET ORAL
Qty: 180 TABLET | Refills: 3 | Status: SHIPPED | OUTPATIENT
Start: 2020-11-18 | End: 2021-11-30

## 2020-11-18 RX ORDER — LISINOPRIL 10 MG/1
TABLET ORAL
Qty: 90 TABLET | Refills: 1 | Status: SHIPPED | OUTPATIENT
Start: 2020-11-18 | End: 2021-06-13

## 2020-11-18 ASSESSMENT — PATIENT HEALTH QUESTIONNAIRE - PHQ9
SUM OF ALL RESPONSES TO PHQ QUESTIONS 1-9: 0
SUM OF ALL RESPONSES TO PHQ9 QUESTIONS 1 & 2: 0
1. LITTLE INTEREST OR PLEASURE IN DOING THINGS: 0
SUM OF ALL RESPONSES TO PHQ QUESTIONS 1-9: 0
2. FEELING DOWN, DEPRESSED OR HOPELESS: 0
SUM OF ALL RESPONSES TO PHQ QUESTIONS 1-9: 0

## 2020-11-18 ASSESSMENT — LIFESTYLE VARIABLES: HOW OFTEN DO YOU HAVE A DRINK CONTAINING ALCOHOL: 0

## 2020-11-18 NOTE — PROGRESS NOTES
After obtaining consent, and per orders of Romi Page,APRN  injection of Fluad was given in the left deltoid by Starla Caruso LPN  Pt tolerated well and had no reactions.

## 2020-11-18 NOTE — PROGRESS NOTES
Medicare Annual Wellness Visit  Name: Jacques Kidd Date: 2020   MRN: [id number] Sex: Female   Age: 77 y.o. Ethnicity: Non-/Non    : 1945 Race: Elina Osman is here for Medicare AWV    Screenings for behavioral, psychosocial and functional/safety risks, and cognitive dysfunction are all negative except as indicated below. These results, as well as other patient data from the [address] form, are documented in Flowsheets linked to this Encounter. Allergies   Allergen Reactions    Bee Venom Shortness Of Breath and Swelling    Adhesive Tape Rash       Prior to Visit Medications    Medication Sig Taking? Authorizing Provider   alendronate (FOSAMAX) 70 MG tablet TAKE 1 TABLET BY MOUTH ONE TIME PER WEEK Yes MIN Taylor   levothyroxine (SYNTHROID) 137 MCG tablet Take 1 tablet by mouth daily Yes MIN Talyor   venlafaxine (EFFEXOR XR) 150 MG extended release capsule TAKE 1 CAPSULE BY MOUTH EVERY DAY.  Yes MIN Taylor   lisinopril (PRINIVIL;ZESTRIL) 10 MG tablet TAKE 1 TABLET BY MOUTH EVERY DAY Yes MIN Taylor   JARDIANCE 25 MG tablet TAKE 1 TABLET BY MOUTH EVERY DAY Yes MIN Taylor   clopidogrel (PLAVIX) 75 MG tablet TAKE 1 TABLET BY MOUTH EVERY DAY Yes Selena Arambula MD   glimepiride (AMARYL) 4 MG tablet TAKE 1 TABLET BY MOUTH TWICE A DAY Yes Selena Arambula MD   lovastatin (MEVACOR) 40 MG tablet TAKE 1 TABLET BY MOUTH AT BEDTIME Yes Selena Arambula MD   aspirin 81 MG tablet Take 81 mg by mouth daily Yes Historical Provider, MD   vitamin D (CHOLECALCIFEROL) 1000 UNIT TABS tablet Take 1,000 Units by mouth daily Yes Historical Provider, MD   vitamin B-12 (CYANOCOBALAMIN) 1000 MCG tablet Take 5,000 mcg by mouth daily  Yes Historical Provider, MD   Pediatric Multivitamins-Fl (MULT-VITAMIN/FLUORIDE PO) Take by mouth daily  Yes Historical Provider, MD   metFORMIN (GLUCOPHAGE) 1000 MG tablet and time, well developed and well- nourished, in no acute distress  Skin: warm and dry, no rash or erythema  Head: normocephalic and atraumatic  Eyes: pupils equal, round, and reactive to light, extraocular eye movements intact, conjunctivae normal  ENT: tympanic membrane, external ear and ear canal normal bilaterally, nose without deformity, nasal mucosa and turbinates normal without polyps  Neck: supple and non-tender without mass, no thyromegaly or thyroid nodules, no cervical lymphadenopathy  Pulmonary/Chest: clear to auscultation bilaterally- no wheezes, rales or rhonchi, normal air movement, no respiratory distress  Cardiovascular: normal rate, regular rhythm, normal S1 and S2, no murmurs, rubs, clicks, or gallops, distal pulses intact, no carotid bruits  Abdomen: soft, non-tender, non-distended, normal bowel sounds, no masses or organomegaly  Extremities: no cyanosis, clubbing or edema  Musculoskeletal: normal range of motion, no joint swelling, deformity or tenderness  Neurologic: reflexes normal and symmetric, no cranial nerve deficit, gait, coordination and speech normal    Patient's complete Health Risk Assessment and screening values have been reviewed and are found in Flowsheets. The following problems were reviewed today and where indicated follow up appointments were made and/or referrals ordered. Positive Risk Factor Screenings with Interventions:     General Health and ACP:  General  In general, how would you say your health is?: Good  In the past 7 days, have you experienced any of the following?  New or Increased Pain, New or Increased Fatigue, Loneliness, Social Isolation, Stress or Anger?: None of These  Do you get the social and emotional support that you need?: Yes  Do you have a Living Will?: (!) No  Advance Directives     Power of  Living Will ACP-Advance Directive ACP-Power of     Not on File Not on File Filed [address] [facility] [address] Risk Interventions:  · No Living Will: Patient declines ACP discussion/assistance    Health Habits/Nutrition:  Health Habits/Nutrition  Do you exercise for at least 20 minutes 2-3 times per week?: (!) No  Have you lost any weight without trying in the past 3 months?: No  Do you eat fewer than 2 meals per day?: No  Have you seen a dentist within the past year?: (!) No  Body mass index: (!) 32  Health Habits/Nutrition Interventions:  · Dental exam overdue:  patient encouraged to make appointment with his/her dentist    Hearing/Vision:  No exam data present  Hearing/Vision  Do you or your family notice any trouble with your hearing?: (!) Yes  Do you have difficulty driving, watching TV, or doing any of your daily activities because of your eyesight?: No  Have you had an eye exam within the past year?: Yes  Hearing/Vision Interventions:  · Hearing concerns:  patient declines any further evaluation/treatment for hearing issues    Personalized Preventive Plan   Current Health Maintenance Status  Immunization History   Administered Date(s) Administered    Influenza, High Dose (Fluzone 65 yrs and older) 09/20/2018    Influenza, Triv, inactivated, subunit, adjuvanted, IM (Fluad 65 yrs and older) 11/13/2019    Pneumococcal Conjugate 13-valent (Franceen Sarks) 11/01/2011, 01/03/2019    Pneumococcal Polysaccharide (Axpoddmtf09) 11/01/2003, 01/08/2019    Tdap (Boostrix, Adacel) 06/09/2015        Health Maintenance   Topic Date Due    Shingles Vaccine (1 of 2) 01/16/1996    Annual Wellness Visit (AWV)  05/29/2019    Diabetic foot exam  09/20/2019    Flu vaccine (1) 09/01/2020    Diabetic microalbuminuria test  11/20/2020    A1C test (Diabetic or Prediabetic)  07/17/2021    Lipid screen  07/17/2021    TSH testing  07/17/2021    Potassium monitoring  07/17/2021    Creatinine monitoring  07/17/2021    Breast cancer screen  09/08/2021    Diabetic retinal exam  09/10/2021    Colon cancer screen colonoscopy  07/25/2024    DTaP/Tdap/Td vaccine (2 - Td) 06/09/2025    DEXA (modify frequency per FRAX score)  Completed    Pneumococcal 65+ years Vaccine  Completed    Hepatitis C screen  Completed    Hepatitis A vaccine  Aged Out    Hib vaccine  Aged Out    Meningococcal (ACWY) vaccine  Aged Out     Recommendations for Ad Hoc Labs Due: see orders and patient instructions/AVS.  . Recommended screening schedule for the next 5-10 years is provided to the patient in written form: see Patient Glory Raya was seen today for medicare aw. Diagnoses and all orders for this visit:    Flu vaccine need    Type 2 diabetes mellitus with complication, without long-term current use of insulin ([facility] [address]  - Patient has stopped taking metformin; will recheck A1C but likely needs to resume 1000 mg metformin BID. Positive depression screening    Essential hypertension  - Stable. Hypothyroidism  - Synthroid increase at last visit; will need to recheck.

## 2020-11-18 NOTE — PATIENT INSTRUCTIONS
Personalized Preventive Plan for Keena Lyon - 11/18/2020  Medicare offers a range of preventive health benefits. Some of the tests and screenings are paid in full while other may be subject to a deductible, co-insurance, and/or copay. Some of these benefits include a comprehensive review of your medical history including lifestyle, illnesses that may run in your family, and various assessments and screenings as appropriate. After reviewing your medical record and screening and assessments performed today your provider may have ordered immunizations, labs, imaging, and/or referrals for you. A list of these orders (if applicable) as well as your Preventive Care list are included within your After Visit Summary for your review. Other Preventive Recommendations:    · A preventive eye exam performed by an eye specialist is recommended every 1-2 years to screen for glaucoma; cataracts, macular degeneration, and other eye disorders. · A preventive dental visit is recommended every 6 months. · Try to get at least 150 minutes of exercise per week or 10,000 steps per day on a pedometer . · Order or download the FREE \"Exercise & Physical Activity: Your Everyday Guide\" from [employer]. Call 6-227.717.4959 or search [employer] online. · You need 7624-3808 mg of calcium and 2271-8040 IU of vitamin D per day. It is possible to meet your calcium requirement with diet alone, but a vitamin D supplement is usually necessary to meet this goal.  · When exposed to the sun, use a sunscreen that protects against both UVA and UVB radiation with an SPF of 30 or greater. Reapply every 2 to 3 hours or after sweating, drying off with a towel, or swimming. · Always wear a seat belt when traveling in a car. Always wear a helmet when riding a bicycle or motorcycle.

## 2021-02-05 DIAGNOSIS — E11.8 TYPE 2 DIABETES MELLITUS WITH COMPLICATION, WITHOUT LONG-TERM CURRENT USE OF INSULIN (HCC): ICD-10-CM

## 2021-02-05 DIAGNOSIS — E03.9 ACQUIRED HYPOTHYROIDISM: ICD-10-CM

## 2021-02-05 DIAGNOSIS — E11.9 TYPE 2 DIABETES MELLITUS WITHOUT COMPLICATION, WITHOUT LONG-TERM CURRENT USE OF INSULIN (HCC): ICD-10-CM

## 2021-02-05 LAB
ALBUMIN SERPL-MCNC: 4.3 G/DL (ref 3.5–5.2)
ALP BLD-CCNC: 61 U/L (ref 35–104)
ALT SERPL-CCNC: 16 U/L (ref 5–33)
ANION GAP SERPL CALCULATED.3IONS-SCNC: 12 MMOL/L (ref 7–19)
AST SERPL-CCNC: 16 U/L (ref 5–32)
BASOPHILS ABSOLUTE: 0.1 K/UL (ref 0–0.2)
BASOPHILS RELATIVE PERCENT: 1.6 % (ref 0–1)
BILIRUB SERPL-MCNC: 0.4 MG/DL (ref 0.2–1.2)
BUN BLDV-MCNC: 18 MG/DL (ref 8–23)
CALCIUM SERPL-MCNC: 9.5 MG/DL (ref 8.8–10.2)
CHLORIDE BLD-SCNC: 104 MMOL/L (ref 98–111)
CHOLESTEROL, TOTAL: 191 MG/DL (ref 160–199)
CO2: 26 MMOL/L (ref 22–29)
CREAT SERPL-MCNC: 0.8 MG/DL (ref 0.5–0.9)
EOSINOPHILS ABSOLUTE: 0.2 K/UL (ref 0–0.6)
EOSINOPHILS RELATIVE PERCENT: 3.4 % (ref 0–5)
GFR AFRICAN AMERICAN: >59
GFR NON-AFRICAN AMERICAN: >60
GLUCOSE BLD-MCNC: 134 MG/DL (ref 74–109)
HBA1C MFR BLD: 8.1 % (ref 4–6)
HCT VFR BLD CALC: 46.2 % (ref 37–47)
HDLC SERPL-MCNC: 60 MG/DL (ref 65–121)
HEMOGLOBIN: 15 G/DL (ref 12–16)
IMMATURE GRANULOCYTES #: 0 K/UL
LDL CHOLESTEROL CALCULATED: 92 MG/DL
LYMPHOCYTES ABSOLUTE: 2.2 K/UL (ref 1.1–4.5)
LYMPHOCYTES RELATIVE PERCENT: 39.8 % (ref 20–40)
MCH RBC QN AUTO: 30.4 PG (ref 27–31)
MCHC RBC AUTO-ENTMCNC: 32.5 G/DL (ref 33–37)
MCV RBC AUTO: 93.7 FL (ref 81–99)
MONOCYTES ABSOLUTE: 0.4 K/UL (ref 0–0.9)
MONOCYTES RELATIVE PERCENT: 6.2 % (ref 0–10)
NEUTROPHILS ABSOLUTE: 2.7 K/UL (ref 1.5–7.5)
NEUTROPHILS RELATIVE PERCENT: 48.5 % (ref 50–65)
PDW BLD-RTO: 12.9 % (ref 11.5–14.5)
PLATELET # BLD: 262 K/UL (ref 130–400)
PMV BLD AUTO: 11.1 FL (ref 9.4–12.3)
POTASSIUM SERPL-SCNC: 4.3 MMOL/L (ref 3.5–5)
RBC # BLD: 4.93 M/UL (ref 4.2–5.4)
SODIUM BLD-SCNC: 142 MMOL/L (ref 136–145)
TOTAL PROTEIN: 6.9 G/DL (ref 6.6–8.7)
TRIGL SERPL-MCNC: 197 MG/DL (ref 0–149)
TSH SERPL DL<=0.05 MIU/L-ACNC: 9.23 UIU/ML (ref 0.27–4.2)
WBC # BLD: 5.6 K/UL (ref 4.8–10.8)

## 2021-03-03 ENCOUNTER — OFFICE VISIT (OUTPATIENT)
Dept: PRIMARY CARE CLINIC | Age: 76
End: 2021-03-03
Payer: MEDICARE

## 2021-03-03 VITALS
TEMPERATURE: 98.1 F | HEART RATE: 80 BPM | BODY MASS INDEX: 31.1 KG/M2 | DIASTOLIC BLOOD PRESSURE: 64 MMHG | RESPIRATION RATE: 16 BRPM | OXYGEN SATURATION: 98 % | HEIGHT: 62 IN | SYSTOLIC BLOOD PRESSURE: 114 MMHG | WEIGHT: 169 LBS

## 2021-03-03 DIAGNOSIS — I77.9 BILATERAL CAROTID ARTERY DISEASE, UNSPECIFIED TYPE (HCC): ICD-10-CM

## 2021-03-03 DIAGNOSIS — R26.81 UNSTEADINESS ON FEET: ICD-10-CM

## 2021-03-03 DIAGNOSIS — E11.9 TYPE 2 DIABETES MELLITUS WITHOUT COMPLICATION, WITHOUT LONG-TERM CURRENT USE OF INSULIN (HCC): ICD-10-CM

## 2021-03-03 DIAGNOSIS — Z99.89 CPAP (CONTINUOUS POSITIVE AIRWAY PRESSURE) DEPENDENCE: ICD-10-CM

## 2021-03-03 DIAGNOSIS — I10 ESSENTIAL HYPERTENSION: ICD-10-CM

## 2021-03-03 DIAGNOSIS — I25.10 CORONARY ARTERY DISEASE INVOLVING NATIVE CORONARY ARTERY OF NATIVE HEART WITHOUT ANGINA PECTORIS: ICD-10-CM

## 2021-03-03 DIAGNOSIS — E03.9 ACQUIRED HYPOTHYROIDISM: Primary | ICD-10-CM

## 2021-03-03 PROCEDURE — 4040F PNEUMOC VAC/ADMIN/RCVD: CPT | Performed by: NURSE PRACTITIONER

## 2021-03-03 PROCEDURE — G8417 CALC BMI ABV UP PARAM F/U: HCPCS | Performed by: NURSE PRACTITIONER

## 2021-03-03 PROCEDURE — 3052F HG A1C>EQUAL 8.0%<EQUAL 9.0%: CPT | Performed by: NURSE PRACTITIONER

## 2021-03-03 PROCEDURE — G8484 FLU IMMUNIZE NO ADMIN: HCPCS | Performed by: NURSE PRACTITIONER

## 2021-03-03 PROCEDURE — 99214 OFFICE O/P EST MOD 30 MIN: CPT | Performed by: NURSE PRACTITIONER

## 2021-03-03 PROCEDURE — 1090F PRES/ABSN URINE INCON ASSESS: CPT | Performed by: NURSE PRACTITIONER

## 2021-03-03 PROCEDURE — 3017F COLORECTAL CA SCREEN DOC REV: CPT | Performed by: NURSE PRACTITIONER

## 2021-03-03 PROCEDURE — G8399 PT W/DXA RESULTS DOCUMENT: HCPCS | Performed by: NURSE PRACTITIONER

## 2021-03-03 PROCEDURE — G8427 DOCREV CUR MEDS BY ELIG CLIN: HCPCS | Performed by: NURSE PRACTITIONER

## 2021-03-03 PROCEDURE — 1123F ACP DISCUSS/DSCN MKR DOCD: CPT | Performed by: NURSE PRACTITIONER

## 2021-03-03 PROCEDURE — 2022F DILAT RTA XM EVC RTNOPTHY: CPT | Performed by: NURSE PRACTITIONER

## 2021-03-03 PROCEDURE — 1036F TOBACCO NON-USER: CPT | Performed by: NURSE PRACTITIONER

## 2021-03-03 RX ORDER — LEVOTHYROXINE SODIUM 0.15 MG/1
150 TABLET ORAL DAILY
Qty: 30 TABLET | Refills: 5 | Status: SHIPPED | OUTPATIENT
Start: 2021-03-03 | End: 2021-08-24

## 2021-03-03 ASSESSMENT — ENCOUNTER SYMPTOMS
COLOR CHANGE: 0
BACK PAIN: 0
NAUSEA: 0
RHINORRHEA: 0
SHORTNESS OF BREATH: 0
VOMITING: 0
VOICE CHANGE: 0
PHOTOPHOBIA: 0
COUGH: 0

## 2021-03-03 ASSESSMENT — PATIENT HEALTH QUESTIONNAIRE - PHQ9
1. LITTLE INTEREST OR PLEASURE IN DOING THINGS: 0
2. FEELING DOWN, DEPRESSED OR HOPELESS: 0

## 2021-03-03 NOTE — PROGRESS NOTES
[address] [facility]  [address] [address]  [address] [address]  Dept: [phone]  Dept Fax: [phone]  Loc: [phone]    Steph Jacinto is a 77 y.o. female who presents today for her medical conditions/complaints as noted below. Steph Jacinto is c/o of Hypothyroidism (doing well; no compaints; would like to discuss lab results) and Diabetes        HPI:     HPI   Chief Complaint   Patient presents with    Hypothyroidism     doing well; no compaints; would like to discuss lab results    Diabetes     Patient presents today for follow-up hypothyroidism, hypertension and hypothyroidism. Her recent A1C was at 8.1 which was an increase from 7.3 six months ago. She is currently taking jardiance 25 mg, glimepiride 4 mg, and metformin 1000 mg twice daily. She reports blood sugar is not often checked. Her recent TSH was elevated at 9.230; she is currently taking levothyroxine 137 mcg daily. Lipids stable on lovastatin 40 mg. Patient is needing new cpap supplies; she is compliant with using this device and still benefits from using this. Patient has history of CAD; she has a stent placed approximately in 2004. She also has history of carotid artery disease; she is due for carotid study. She reports concerns due to her brother having a complete occlusion and multiple strokes.      Lab Results   Component Value Date    TSH 9.230 (H) 02/05/2021     Lab Results   Component Value Date    LABA1C 8.1 (H) 02/05/2021     No results found for: EAG        Past Medical History:   Diagnosis Date    CAD (coronary artery disease)     Carotid artery disease (HCC)     CPAP (continuous positive airway pressure) dependence     11cm    Depression     Diabetes (HCC)     Fibromyalgia     Hypothyroidism     JOSE (obstructive sleep apnea)     AHI:  27.3 per PSG, 2011/CPAP    PLMD (periodic limb movement disorder)     RLS (restless legs syndrome)       Past Surgical Musculoskeletal: Negative for back pain and neck pain. Skin: Negative for color change and rash. Allergic/Immunologic: Negative for environmental allergies and food allergies. Neurological: Negative for dizziness, speech difficulty and headaches. Hematological: Does not bruise/bleed easily. Psychiatric/Behavioral: Negative for sleep disturbance and suicidal ideas. Objective:     Physical Exam  Vitals signs and nursing note reviewed. Constitutional:       Appearance: She is well-developed. HENT:      Head: Atraumatic. Right Ear: External ear normal.      Left Ear: External ear normal.      Nose: Nose normal.   Eyes:      Conjunctiva/sclera: Conjunctivae normal.      Pupils: Pupils are equal, round, and reactive to light. Neck:      Musculoskeletal: Normal range of motion and neck supple. Cardiovascular:      Rate and Rhythm: Normal rate and regular rhythm. Heart sounds: Normal heart sounds, S1 normal and S2 normal.   Pulmonary:      Effort: Pulmonary effort is normal.      Breath sounds: Normal breath sounds. Abdominal:      General: Bowel sounds are normal.      Palpations: Abdomen is soft. Musculoskeletal: Normal range of motion. Skin:     General: Skin is warm and dry. Neurological:      Mental Status: She is alert and oriented to person, place, and time. Psychiatric:         Behavior: Behavior normal.       /64   Pulse 80   Temp 98.1 °F (36.7 °C) (Temporal)   Resp 16   Ht 5' 2\" (1.575 m)   Wt 169 lb (76.7 kg)   LMP  (LMP Unknown)   SpO2 98%   BMI 30.91 kg/m²     Assessment:       Diagnosis Orders   1. Acquired hypothyroidism  TSH without Reflex    Comprehensive Metabolic Panel    Hemoglobin A1C    TSH without Reflex   2. Essential hypertension  TSH without Reflex    Comprehensive Metabolic Panel    Hemoglobin A1C    TSH without Reflex   3.  Type 2 diabetes mellitus without complication, without long-term current use of insulin ([facility])  TSH without Reflex Comprehensive Metabolic Panel    Hemoglobin A1C    TSH without Reflex   4. CPAP (continuous positive airway pressure) dependence     5. Bilateral carotid artery disease, unspecified type ([facility] [address]  VL DUP CAROTID BILATERAL   6. Coronary artery disease involving native coronary artery of native heart without angina pectoris  VL DUP CAROTID BILATERAL   7. Unsteadiness on feet   VL DUP CAROTID BILATERAL         Plan:     More than 50% of the time was spent counseling and coordinating care for a total time of 30 min face to face. 1. Renew cpap supplies. 2. Increase synthroid to 150 mcg; repeat TSH in 4-6 weeks. 3. Diet to control diabetes; no change in medications at this time. 4. Carotid study ordered. 5. Follow-up in 4 months with labs prior to visit. Patient given educational materials -see patient instructions. Discussed use, benefit, and side effects of prescribed medications. All patient questions answered. Pt voiced understanding. Reviewed health maintenance. Instructed to continue currentmedications, diet and exercise. Patient agreed with treatment plan. Follow up as directed. MEDICATIONS:  Orders Placed This Encounter   Medications    levothyroxine (SYNTHROID) 150 MCG tablet     Sig: Take 1 tablet by mouth daily     Dispense:  30 tablet     Refill:  5     Discontinue synthroid 125mcg         ORDERS:  Orders Placed This Encounter   Procedures    VL DUP CAROTID BILATERAL    TSH without Reflex    Comprehensive Metabolic Panel    Hemoglobin A1C    TSH without Reflex       Follow-up:  Return in about 4 months (around 7/3/2021) for in office, follow-up with labs. PATIENT INSTRUCTIONS:  Patient Instructions   We are committed to providing you with the best care possible. In order to help us achieve these goals please remember to bring all medications, herbal products, and over the counter supplements with you to each visit.      If your provider has ordered testing for you, please be sure to follow up with our office if you have not received results within 7 days after the testing took place. *If you receive a survey after visiting one of our offices, please take time to share your experience concerning your physician office visit. These surveys are confidential and no health information about you is shared. We are eager to improve for you and we are counting on your feedback to help make that happen. Electronically signed by MIN Armijo on 3/3/2021 at 9:52 AM    EMR Dragon/transcription disclaimer:  Much of thisencounter note is electronic transcription/translation of spoken language to printed texts. The electronic translation of spoken language may be erroneous, or at times, nonsensical words or phrases may be inadvertentlytranscribed.   Although I have reviewed the note for such errors, some may still exist.

## 2021-03-04 ENCOUNTER — TELEPHONE (OUTPATIENT)
Dept: PRIMARY CARE CLINIC | Age: 76
End: 2021-03-04

## 2021-03-17 ENCOUNTER — HOSPITAL ENCOUNTER (OUTPATIENT)
Dept: NON INVASIVE DIAGNOSTICS | Age: 76
Discharge: HOME | End: 2021-03-17
Payer: MEDICARE

## 2021-03-17 DIAGNOSIS — I77.9 BILATERAL CAROTID ARTERY DISEASE, UNSPECIFIED TYPE (HCC): ICD-10-CM

## 2021-03-17 DIAGNOSIS — R26.81 UNSTEADINESS ON FEET: ICD-10-CM

## 2021-03-17 DIAGNOSIS — I25.10 CORONARY ARTERY DISEASE INVOLVING NATIVE CORONARY ARTERY OF NATIVE HEART WITHOUT ANGINA PECTORIS: ICD-10-CM

## 2021-03-17 PROCEDURE — 93880 EXTRACRANIAL BILAT STUDY: CPT

## 2021-03-23 ENCOUNTER — TELEPHONE (OUTPATIENT)
Dept: PRIMARY CARE CLINIC | Age: 76
End: 2021-03-23

## 2021-03-23 NOTE — TELEPHONE ENCOUNTER
----- Message from MIN Carreno sent at 3/23/2021  8:39 AM CDT -----  Please inform patient of normal carotid studies.

## 2021-03-23 NOTE — TELEPHONE ENCOUNTER
I have attempted without success to contact this patient by phone to discuss results. No answer, mailbox is full, unable to leave a message for a call back. Minglebox message sent to patient.

## 2021-05-03 ENCOUNTER — TELEPHONE (OUTPATIENT)
Dept: PRIMARY CARE CLINIC | Age: 76
End: 2021-05-03

## 2021-05-03 NOTE — TELEPHONE ENCOUNTER
Tonja, a pharmacist from [facility] (partnered with [facility]) would like it addressed for the patient to receive moderate to high intensity of Statin therapy due to diabetes. This can be addressed at patient's next appointment. Tonja will be faxing over a written form as well.

## 2021-06-12 DIAGNOSIS — I10 ESSENTIAL HYPERTENSION: ICD-10-CM

## 2021-06-12 DIAGNOSIS — Z13.31 POSITIVE DEPRESSION SCREENING: ICD-10-CM

## 2021-06-13 RX ORDER — LISINOPRIL 10 MG/1
TABLET ORAL
Qty: 90 TABLET | Refills: 1 | Status: SHIPPED | OUTPATIENT
Start: 2021-06-13 | End: 2021-12-06

## 2021-06-13 RX ORDER — VENLAFAXINE HYDROCHLORIDE 150 MG/1
CAPSULE, EXTENDED RELEASE ORAL
Qty: 90 CAPSULE | Refills: 1 | Status: SHIPPED | OUTPATIENT
Start: 2021-06-13 | End: 2021-12-06

## 2021-06-27 PROCEDURE — 87086 URINE CULTURE/COLONY COUNT: CPT | Performed by: NURSE PRACTITIONER

## 2021-06-27 PROCEDURE — 87635 SARS-COV-2 COVID-19 AMP PRB: CPT | Performed by: NURSE PRACTITIONER

## 2021-07-07 ENCOUNTER — OFFICE VISIT (OUTPATIENT)
Dept: PRIMARY CARE CLINIC | Age: 76
End: 2021-07-07
Payer: MEDICARE

## 2021-07-07 VITALS
RESPIRATION RATE: 16 BRPM | BODY MASS INDEX: 31.17 KG/M2 | OXYGEN SATURATION: 97 % | DIASTOLIC BLOOD PRESSURE: 64 MMHG | HEIGHT: 62 IN | HEART RATE: 91 BPM | WEIGHT: 169.4 LBS | SYSTOLIC BLOOD PRESSURE: 110 MMHG | TEMPERATURE: 97.2 F

## 2021-07-07 DIAGNOSIS — E03.9 ACQUIRED HYPOTHYROIDISM: ICD-10-CM

## 2021-07-07 DIAGNOSIS — I10 ESSENTIAL HYPERTENSION: Primary | ICD-10-CM

## 2021-07-07 DIAGNOSIS — E11.9 TYPE 2 DIABETES MELLITUS WITHOUT COMPLICATION, WITHOUT LONG-TERM CURRENT USE OF INSULIN (HCC): ICD-10-CM

## 2021-07-07 PROCEDURE — 3052F HG A1C>EQUAL 8.0%<EQUAL 9.0%: CPT | Performed by: NURSE PRACTITIONER

## 2021-07-07 PROCEDURE — 3017F COLORECTAL CA SCREEN DOC REV: CPT | Performed by: NURSE PRACTITIONER

## 2021-07-07 PROCEDURE — G8417 CALC BMI ABV UP PARAM F/U: HCPCS | Performed by: NURSE PRACTITIONER

## 2021-07-07 PROCEDURE — 4040F PNEUMOC VAC/ADMIN/RCVD: CPT | Performed by: NURSE PRACTITIONER

## 2021-07-07 PROCEDURE — G8427 DOCREV CUR MEDS BY ELIG CLIN: HCPCS | Performed by: NURSE PRACTITIONER

## 2021-07-07 PROCEDURE — 1123F ACP DISCUSS/DSCN MKR DOCD: CPT | Performed by: NURSE PRACTITIONER

## 2021-07-07 PROCEDURE — 1090F PRES/ABSN URINE INCON ASSESS: CPT | Performed by: NURSE PRACTITIONER

## 2021-07-07 PROCEDURE — G8399 PT W/DXA RESULTS DOCUMENT: HCPCS | Performed by: NURSE PRACTITIONER

## 2021-07-07 PROCEDURE — 99214 OFFICE O/P EST MOD 30 MIN: CPT | Performed by: NURSE PRACTITIONER

## 2021-07-07 PROCEDURE — 1036F TOBACCO NON-USER: CPT | Performed by: NURSE PRACTITIONER

## 2021-07-07 PROCEDURE — 2022F DILAT RTA XM EVC RTNOPTHY: CPT | Performed by: NURSE PRACTITIONER

## 2021-07-07 RX ORDER — LEVOTHYROXINE SODIUM 137 UG/1
TABLET ORAL
COMMUNITY
Start: 2021-04-12 | End: 2021-07-07 | Stop reason: DRUGHIGH

## 2021-07-07 RX ORDER — FLUCONAZOLE 100 MG/1
TABLET ORAL
COMMUNITY
Start: 2021-06-27 | End: 2022-06-16

## 2021-07-07 ASSESSMENT — ENCOUNTER SYMPTOMS
COUGH: 0
SHORTNESS OF BREATH: 0
NAUSEA: 0
VOMITING: 0
VOICE CHANGE: 0
BACK PAIN: 0
COLOR CHANGE: 0
PHOTOPHOBIA: 0
RHINORRHEA: 0

## 2021-07-07 NOTE — PROGRESS NOTES
[address] [facility]  [address] [address] [place] [address]  Dept: [phone]  Dept Fax: [phone]  Loc: [phone]    Cecelia Vega is a 77 y.o. female who presents today for her medical conditions/complaints as noted below. Cecelia Vega is c/o of Hypertension (4 month follow up), Diabetes (has not been taking metformin for a while now), and Sinusitis (was seen at [facility] Sunday and was told she has a sinus and ear infection)        HPI:     HPI   Chief Complaint   Patient presents with    Hypertension     4 month follow up    Diabetes     has not been taking metformin for a while now    Sinusitis     was seen at [facility] Sunday and was told she has a sinus and ear infection     Patient presents today for follow-up hypertension and diabetes. She is due for labs; she states she will go and have this done in the next 1-2 weeks. She has discontinued metformin. Last A1C was 8.1 in February. She does not check blood sugars regularly. Recently she was seen and treated at [facility] for sinusitis. She was given bactrim and diflucan. She states she is much improved. She has recently lost her brother and has been physically exhausted from all the legal aspects of that, she reports.      Past Medical History:   Diagnosis Date    CAD (coronary artery disease)     Carotid artery disease (HCC)     CPAP (continuous positive airway pressure) dependence     11cm    Depression     Diabetes (HCC)     Fibromyalgia     Hypothyroidism     JOSE (obstructive sleep apnea)     AHI:  27.3 per PSG, 2011/CPAP    PLMD (periodic limb movement disorder)     RLS (restless legs syndrome)       Past Surgical History:   Procedure Laterality Date    COLONOSCOPY  12/04/2014    Dr Erich Arguelles- no polyps    COLONOSCOPY  07/25/2019    Dr Daisy Min, 5 yr recall    COLONOSCOPY N/A 7/25/2019    COLORECTAL CANCER SCREENING, NOT HIGH RISK performed by Madeleine Antoine MD 1000 UNIT TABS tablet Take 1,000 Units by mouth daily      vitamin B-12 (CYANOCOBALAMIN) 1000 MCG tablet Take 5,000 mcg by mouth daily       Pediatric Multivitamins-Fl (MULT-VITAMIN/FLUORIDE PO) Take by mouth daily       metFORMIN (GLUCOPHAGE) 1000 MG tablet TAKE 1 TABLET BY MOUTH 2 TIMES DAILY (WITH MEALS) (Patient not taking: Reported on 7/7/2021) 60 tablet 5     No current facility-administered medications on file prior to visit. Allergies   Allergen Reactions    Bee Venom Shortness Of Breath and Swelling    Adhesive Tape Rash       Health Maintenance   Topic Date Due    COVID-19 Vaccine (1) Never done    Shingles Vaccine (1 of 2) Never done    Diabetic foot exam  09/20/2019    Flu vaccine (1) 09/01/2021    Diabetic retinal exam  09/10/2021    Annual Wellness Visit (AWV)  11/19/2021    A1C test (Diabetic or Prediabetic)  02/05/2022    Lipid screen  02/05/2022    TSH testing  02/05/2022    Potassium monitoring  02/05/2022    Creatinine monitoring  02/05/2022    Colon cancer screen colonoscopy  07/25/2024    DTaP/Tdap/Td vaccine (2 - Td or Tdap) 06/09/2025    DEXA (modify frequency per FRAX score)  Completed    Pneumococcal 65+ years Vaccine  Completed    Hepatitis C screen  Completed    Hepatitis A vaccine  Aged Out    Hib vaccine  Aged Out    Meningococcal (ACWY) vaccine  Aged Out       Subjective:      Review of Systems   Constitutional: Negative for chills and fever. HENT: Negative for ear pain, hearing loss, rhinorrhea and voice change. Eyes: Negative for photophobia and visual disturbance. Respiratory: Negative for cough and shortness of breath. Cardiovascular: Negative for chest pain and palpitations. Gastrointestinal: Negative for nausea and vomiting. Endocrine: Negative. Negative for cold intolerance and heat intolerance. Genitourinary: Negative for difficulty urinating and flank pain. Musculoskeletal: Negative for back pain and neck pain.    Skin: Negative for color change and rash. Allergic/Immunologic: Negative for environmental allergies and food allergies. Neurological: Negative for dizziness, speech difficulty and headaches. Hematological: Does not bruise/bleed easily. Psychiatric/Behavioral: Negative for sleep disturbance and suicidal ideas. Objective:     Physical Exam  Vitals and nursing note reviewed. Constitutional:       Appearance: She is well-developed. HENT:      Head: Atraumatic. Right Ear: External ear normal.      Left Ear: External ear normal.      Nose: Nose normal.   Eyes:      Conjunctiva/sclera: Conjunctivae normal.      Pupils: Pupils are equal, round, and reactive to light. Cardiovascular:      Rate and Rhythm: Normal rate and regular rhythm. Heart sounds: Normal heart sounds, S1 normal and S2 normal.   Pulmonary:      Effort: Pulmonary effort is normal.      Breath sounds: Normal breath sounds. Abdominal:      General: Bowel sounds are normal.      Palpations: Abdomen is soft. Musculoskeletal:         General: Normal range of motion. Cervical back: Normal range of motion and neck supple. Skin:     General: Skin is warm and dry. Neurological:      Mental Status: She is alert and oriented to person, place, and time. Psychiatric:         Behavior: Behavior normal.       /64   Pulse 91   Temp 97.2 °F (36.2 °C) (Temporal)   Resp 16   Ht 5' 2\" (1.575 m)   Wt 169 lb 6.4 oz (76.8 kg)   LMP  (LMP Unknown)   SpO2 97%   BMI 30.98 kg/m²     Assessment:       Diagnosis Orders   1. Essential hypertension     2. Type 2 diabetes mellitus without complication, without long-term current use of insulin ([address]     3. Acquired hypothyroidism           Plan:   More than 50% of the time was spent counseling and coordinating care for a total time of 30 min face to face. Blood pressure rechecked and stable today. She will return in 1-2 weeks for fasting labs. Follow-up in 6 months or sooner if needed. Patient given educational materials -see patient instructions. Discussed use, benefit, and side effects of prescribed medications. All patient questions answered. Pt voiced understanding. Reviewed health maintenance. Instructed to continue currentmedications, diet and exercise. Patient agreed with treatment plan. Follow up as directed. MEDICATIONS:  No orders of the defined types were placed in this encounter. ORDERS:  No orders of the defined types were placed in this encounter. Follow-up:  Return in about 6 months (around 1/7/2022) for in office. PATIENT INSTRUCTIONS:  Patient Instructions   We are committed to providing you with the best care possible. In order to help us achieve these goals please remember to bring all medications, herbal products, and over the counter supplements with you to each visit. If your provider has ordered testing for you, please be sure to follow up with our office if you have not received results within 7 days after the testing took place. *If you receive a survey after visiting one of our offices, please take time to share your experience concerning your physician office visit. These surveys are confidential and no health information about you is shared. We are eager to improve for you and we are counting on your feedback to help make that happen. Electronically signed by MIN Bobby on 7/7/2021 at 11:17 AM    EMR Dragon/transcription disclaimer:  Much of thisencounter note is electronic transcription/translation of spoken language to printed texts. The electronic translation of spoken language may be erroneous, or at times, nonsensical words or phrases may be inadvertentlytranscribed.   Although I have reviewed the note for such errors, some may still exist.

## 2021-07-26 DIAGNOSIS — E03.9 ACQUIRED HYPOTHYROIDISM: ICD-10-CM

## 2021-07-26 DIAGNOSIS — E11.9 TYPE 2 DIABETES MELLITUS WITHOUT COMPLICATION, WITHOUT LONG-TERM CURRENT USE OF INSULIN (HCC): ICD-10-CM

## 2021-07-26 DIAGNOSIS — I10 ESSENTIAL HYPERTENSION: ICD-10-CM

## 2021-07-26 LAB
ALBUMIN SERPL-MCNC: 4.2 G/DL (ref 3.5–5.2)
ALP BLD-CCNC: 72 U/L (ref 35–104)
ALT SERPL-CCNC: 19 U/L (ref 5–33)
ANION GAP SERPL CALCULATED.3IONS-SCNC: 17 MMOL/L (ref 7–19)
AST SERPL-CCNC: 20 U/L (ref 5–32)
BILIRUB SERPL-MCNC: 0.5 MG/DL (ref 0.2–1.2)
BUN BLDV-MCNC: 15 MG/DL (ref 8–23)
CALCIUM SERPL-MCNC: 10.2 MG/DL (ref 8.8–10.2)
CHLORIDE BLD-SCNC: 106 MMOL/L (ref 98–111)
CO2: 26 MMOL/L (ref 22–29)
CREAT SERPL-MCNC: 0.8 MG/DL (ref 0.5–0.9)
GFR AFRICAN AMERICAN: >59
GFR NON-AFRICAN AMERICAN: >60
GLUCOSE BLD-MCNC: 195 MG/DL (ref 74–109)
HBA1C MFR BLD: 8.3 % (ref 4–6)
POTASSIUM SERPL-SCNC: 4.3 MMOL/L (ref 3.5–5)
SODIUM BLD-SCNC: 149 MMOL/L (ref 136–145)
TOTAL PROTEIN: 7.2 G/DL (ref 6.6–8.7)
TSH SERPL DL<=0.05 MIU/L-ACNC: 0.53 UIU/ML (ref 0.27–4.2)

## 2021-08-02 RX ORDER — EMPAGLIFLOZIN 25 MG/1
TABLET, FILM COATED ORAL
Qty: 90 TABLET | Refills: 1 | Status: SHIPPED | OUTPATIENT
Start: 2021-08-02 | End: 2022-01-21

## 2021-08-09 ENCOUNTER — TELEPHONE (OUTPATIENT)
Dept: PRIMARY CARE CLINIC | Age: 76
End: 2021-08-09

## 2021-08-24 RX ORDER — LEVOTHYROXINE SODIUM 0.15 MG/1
TABLET ORAL
Qty: 90 TABLET | Refills: 1 | Status: SHIPPED | OUTPATIENT
Start: 2021-08-24 | End: 2022-02-17

## 2021-10-25 DIAGNOSIS — M85.80 OSTEOPENIA, UNSPECIFIED LOCATION: ICD-10-CM

## 2021-10-25 RX ORDER — ALENDRONATE SODIUM 70 MG/1
TABLET ORAL
Qty: 12 TABLET | Refills: 3 | Status: SHIPPED | OUTPATIENT
Start: 2021-10-25 | End: 2022-09-26

## 2021-11-23 ENCOUNTER — OFFICE VISIT (OUTPATIENT)
Dept: PRIMARY CARE CLINIC | Age: 76
End: 2021-11-23
Payer: MEDICARE

## 2021-11-23 VITALS
HEART RATE: 79 BPM | BODY MASS INDEX: 31.8 KG/M2 | DIASTOLIC BLOOD PRESSURE: 60 MMHG | OXYGEN SATURATION: 96 % | HEIGHT: 62 IN | WEIGHT: 172.8 LBS | TEMPERATURE: 96.6 F | SYSTOLIC BLOOD PRESSURE: 102 MMHG

## 2021-11-23 DIAGNOSIS — Z00.00 ROUTINE GENERAL MEDICAL EXAMINATION AT A HEALTH CARE FACILITY: ICD-10-CM

## 2021-11-23 DIAGNOSIS — G47.33 OSA (OBSTRUCTIVE SLEEP APNEA): ICD-10-CM

## 2021-11-23 DIAGNOSIS — E03.9 ACQUIRED HYPOTHYROIDISM: ICD-10-CM

## 2021-11-23 DIAGNOSIS — I10 ESSENTIAL HYPERTENSION: ICD-10-CM

## 2021-11-23 DIAGNOSIS — E11.9 TYPE 2 DIABETES MELLITUS WITHOUT COMPLICATION, WITHOUT LONG-TERM CURRENT USE OF INSULIN (HCC): Primary | ICD-10-CM

## 2021-11-23 DIAGNOSIS — N32.81 OAB (OVERACTIVE BLADDER): ICD-10-CM

## 2021-11-23 LAB — HBA1C MFR BLD: 8.6 %

## 2021-11-23 PROCEDURE — G8417 CALC BMI ABV UP PARAM F/U: HCPCS | Performed by: NURSE PRACTITIONER

## 2021-11-23 PROCEDURE — 83036 HEMOGLOBIN GLYCOSYLATED A1C: CPT | Performed by: NURSE PRACTITIONER

## 2021-11-23 PROCEDURE — 2022F DILAT RTA XM EVC RTNOPTHY: CPT | Performed by: NURSE PRACTITIONER

## 2021-11-23 PROCEDURE — 1090F PRES/ABSN URINE INCON ASSESS: CPT | Performed by: NURSE PRACTITIONER

## 2021-11-23 PROCEDURE — 1036F TOBACCO NON-USER: CPT | Performed by: NURSE PRACTITIONER

## 2021-11-23 PROCEDURE — 4040F PNEUMOC VAC/ADMIN/RCVD: CPT | Performed by: NURSE PRACTITIONER

## 2021-11-23 PROCEDURE — G8484 FLU IMMUNIZE NO ADMIN: HCPCS | Performed by: NURSE PRACTITIONER

## 2021-11-23 PROCEDURE — 1123F ACP DISCUSS/DSCN MKR DOCD: CPT | Performed by: NURSE PRACTITIONER

## 2021-11-23 PROCEDURE — 3052F HG A1C>EQUAL 8.0%<EQUAL 9.0%: CPT | Performed by: NURSE PRACTITIONER

## 2021-11-23 PROCEDURE — 3017F COLORECTAL CA SCREEN DOC REV: CPT | Performed by: NURSE PRACTITIONER

## 2021-11-23 PROCEDURE — G0439 PPPS, SUBSEQ VISIT: HCPCS | Performed by: NURSE PRACTITIONER

## 2021-11-23 PROCEDURE — G8399 PT W/DXA RESULTS DOCUMENT: HCPCS | Performed by: NURSE PRACTITIONER

## 2021-11-23 PROCEDURE — G8427 DOCREV CUR MEDS BY ELIG CLIN: HCPCS | Performed by: NURSE PRACTITIONER

## 2021-11-23 PROCEDURE — 99214 OFFICE O/P EST MOD 30 MIN: CPT | Performed by: NURSE PRACTITIONER

## 2021-11-23 RX ORDER — NITROGLYCERIN 0.3 MG/1
0.3 TABLET SUBLINGUAL EVERY 5 MIN PRN
Qty: 30 TABLET | Refills: 3 | Status: SHIPPED | OUTPATIENT
Start: 2021-11-23

## 2021-11-23 RX ORDER — OXYBUTYNIN CHLORIDE 10 MG/1
10 TABLET, EXTENDED RELEASE ORAL DAILY
Qty: 30 TABLET | Refills: 3 | Status: SHIPPED | OUTPATIENT
Start: 2021-11-23 | End: 2022-02-15

## 2021-11-23 ASSESSMENT — PATIENT HEALTH QUESTIONNAIRE - PHQ9
SUM OF ALL RESPONSES TO PHQ QUESTIONS 1-9: 0
SUM OF ALL RESPONSES TO PHQ9 QUESTIONS 1 & 2: 0
SUM OF ALL RESPONSES TO PHQ QUESTIONS 1-9: 0
1. LITTLE INTEREST OR PLEASURE IN DOING THINGS: 0
2. FEELING DOWN, DEPRESSED OR HOPELESS: 0
SUM OF ALL RESPONSES TO PHQ QUESTIONS 1-9: 0

## 2021-11-23 ASSESSMENT — ENCOUNTER SYMPTOMS
VOMITING: 0
COLOR CHANGE: 0
PHOTOPHOBIA: 0
BACK PAIN: 0
NAUSEA: 0
SHORTNESS OF BREATH: 0
RHINORRHEA: 0
COUGH: 0
VOICE CHANGE: 0

## 2021-11-23 NOTE — PATIENT INSTRUCTIONS
Personalized Preventive Plan for Kristan Mijares - 11/23/2021  Medicare offers a range of preventive health benefits. Some of the tests and screenings are paid in full while other may be subject to a deductible, co-insurance, and/or copay. Some of these benefits include a comprehensive review of your medical history including lifestyle, illnesses that may run in your family, and various assessments and screenings as appropriate. After reviewing your medical record and screening and assessments performed today your provider may have ordered immunizations, labs, imaging, and/or referrals for you. A list of these orders (if applicable) as well as your Preventive Care list are included within your After Visit Summary for your review. Other Preventive Recommendations:    · A preventive eye exam performed by an eye specialist is recommended every 1-2 years to screen for glaucoma; cataracts, macular degeneration, and other eye disorders. · A preventive dental visit is recommended every 6 months. · Try to get at least 150 minutes of exercise per week or 10,000 steps per day on a pedometer . · Order or download the FREE \"Exercise & Physical Activity: Your Everyday Guide\" from [employer]. Call 6-896.249.9242 or search [employer] online. · You need 4778-4861 mg of calcium and 3232-6464 IU of vitamin D per day. It is possible to meet your calcium requirement with diet alone, but a vitamin D supplement is usually necessary to meet this goal.  · When exposed to the sun, use a sunscreen that protects against both UVA and UVB radiation with an SPF of 30 or greater. Reapply every 2 to 3 hours or after sweating, drying off with a towel, or swimming. · Always wear a seat belt when traveling in a car. Always wear a helmet when riding a bicycle or motorcycle.

## 2021-11-23 NOTE — PROGRESS NOTES
[address] [facility]  [address] [address] [place] [address]  Dept: [phone]  Dept Fax: [phone]  Loc: [phone]    Melanie Ortiz is a 77 y.o. female who presents today for her medical conditions/complaints as noted below. Melanie Ortiz is c/o of Medicare AWV      HPI:     HPI   Chief Complaint   Patient presents with   [facility] AWV     Patient presents today for medicare AWV. She is also complaining of all over pain; she has history of fibromyalgia. She is going to have a right knee replacement. Patient also reports she is going to be scheduling cataract surgery. She complains that she is having increasing visits to the restroom; she states this is keeping her up at night. She is not currently on any medication for OAB. She sleeps with a cpap and reports insurance is requesting an updated sleep study to get her new supplies. She will need an order for this today. She reports blood sugars have been unchecked. She has been off her metformin; she discontinued this on her own. Her last A1C was 8.3. Patient requests nitroglycerin refill.      Lab Results   Component Value Date    LABA1C 8.6 11/23/2021     No results found for: EAG        Past Medical History:   Diagnosis Date    CAD (coronary artery disease)     Carotid artery disease (HCC)     CPAP (continuous positive airway pressure) dependence     11cm    Depression     Diabetes (HCC)     Fibromyalgia     Hypothyroidism     JOSE (obstructive sleep apnea)     AHI:  27.3 per PSG, 2011/CPAP    PLMD (periodic limb movement disorder)     RLS (restless legs syndrome)       Past Surgical History:   Procedure Laterality Date    COLONOSCOPY  12/04/2014    Dr Tiffany Mendosa- no polyps    COLONOSCOPY  07/25/2019    Dr Rosi Lindsey, 5 yr recall    COLONOSCOPY N/A 7/25/2019    COLORECTAL CANCER SCREENING, NOT HIGH RISK performed by Jt Yan MD at [facility] teeth extractions    PTCA      with stents    TUBAL LIGATION         Vitals 11/23/2021 7/7/2021 7/7/2021 3/3/2021 11/18/2020 67/89/7442   SYSTOLIC 032 309 84 876 138 883   DIASTOLIC 60 64 60 64 68 72   Site - - - - - Right Upper Arm   Position - - - - - Sitting   Pulse 79 - 91 80 78 78   Temp 96.6 - 97.2 98.1 97.2 97.8   Resp - - 16 16 - -   SpO2 96 - 97 98 98 93   Weight 172 lb 12.8 oz - 169 lb 6.4 oz 169 lb 175 lb 173 lb   Height 5' 2\" - 5' 2\" 5' 2\" 5' 2\" 5' 2\"   Body mass index 31.6 kg/m2 - 30.98 kg/m2 30.91 kg/m2 32 kg/m2 31.64 kg/m2   Some recent data might be hidden       Family History   Problem Relation Age of Onset    Colon Cancer Neg Hx     Colon Polyps Neg Hx     Esophageal Cancer Neg Hx     Liver Cancer Neg Hx     Liver Disease Neg Hx     Stomach Cancer Neg Hx     Rectal Cancer Neg Hx        Social History     Tobacco Use    Smoking status: Never Smoker    Smokeless tobacco: Never Used   Substance Use Topics    Alcohol use: No      Current Outpatient Medications on File Prior to Visit   Medication Sig Dispense Refill    alendronate (FOSAMAX) 70 MG tablet TAKE 1 TABLET BY MOUTH ONE TIME PER WEEK 12 tablet 3    levothyroxine (SYNTHROID) 150 MCG tablet TAKE 1 TABLET BY MOUTH EVERY DAY 90 tablet 1    JARDIANCE 25 MG tablet TAKE 1 TABLET BY MOUTH EVERY DAY 90 tablet 1    fluconazole (DIFLUCAN) 100 MG tablet TAKE 1 TABLET BY MOUTH EVERY DAY FOR 7 DAYS      venlafaxine (EFFEXOR XR) 150 MG extended release capsule TAKE 1 CAPSULE BY MOUTH EVERY DAY 90 capsule 1    lisinopril (PRINIVIL;ZESTRIL) 10 MG tablet TAKE 1 TABLET BY MOUTH EVERY DAY 90 tablet 1    glimepiride (AMARYL) 4 MG tablet TAKE 1 TABLET BY MOUTH TWICE A  tablet 3    clopidogrel (PLAVIX) 75 MG tablet TAKE 1 TABLET BY MOUTH EVERY DAY 30 tablet 5    lovastatin (MEVACOR) 40 MG tablet TAKE 1 TABLET BY MOUTH AT BEDTIME 90 tablet 3    aspirin 81 MG tablet Take 81 mg by mouth daily      vitamin D (CHOLECALCIFEROL) 1000 UNIT TABS tablet Take 1,000 Units by mouth daily      vitamin B-12 (CYANOCOBALAMIN) 1000 MCG tablet Take 5,000 mcg by mouth daily       Pediatric Multivitamins-Fl (MULT-VITAMIN/FLUORIDE PO) Take by mouth daily        No current facility-administered medications on file prior to visit. Allergies   Allergen Reactions    Bee Venom Shortness Of Breath and Swelling    Adhesive Tape Rash       Health Maintenance   Topic Date Due    Shingles Vaccine (1 of 2) Never done    Diabetic foot exam  09/20/2019    Flu vaccine (1) 09/01/2021    Diabetic retinal exam  09/10/2021    Annual Wellness Visit (AWV)  11/19/2021    COVID-19 Vaccine (1) 11/30/2022 (Originally 1/16/1957)    Lipid screen  02/05/2022    TSH testing  07/26/2022    Potassium monitoring  07/26/2022    Creatinine monitoring  07/26/2022    A1C test (Diabetic or Prediabetic)  11/23/2022    Colon cancer screen colonoscopy  07/25/2024    DTaP/Tdap/Td vaccine (2 - Td or Tdap) 06/09/2025    DEXA (modify frequency per FRAX score)  Completed    Pneumococcal 65+ years Vaccine  Completed    Hepatitis C screen  Completed    Hepatitis A vaccine  Aged Out    Hib vaccine  Aged Out    Meningococcal (ACWY) vaccine  Aged Out       Subjective:      Review of Systems   Constitutional: Negative for chills and fever. HENT: Negative for ear pain, hearing loss, rhinorrhea and voice change. Eyes: Negative for photophobia and visual disturbance. Respiratory: Negative for cough and shortness of breath. Cardiovascular: Negative for chest pain and palpitations. Gastrointestinal: Negative for nausea and vomiting. Endocrine: Negative. Negative for cold intolerance and heat intolerance. Genitourinary: Positive for frequency. Negative for difficulty urinating and flank pain. Musculoskeletal: Negative for back pain and neck pain. Skin: Negative for color change and rash. Allergic/Immunologic: Negative for environmental allergies and food allergies. Neurological: Negative for dizziness, speech difficulty and headaches. Hematological: Does not bruise/bleed easily. Psychiatric/Behavioral: Negative for sleep disturbance and suicidal ideas. Objective:     Physical Exam  Vitals and nursing note reviewed. Constitutional:       Appearance: She is well-developed. HENT:      Head: Atraumatic. Right Ear: External ear normal.      Left Ear: External ear normal.      Nose: Nose normal.   Eyes:      Conjunctiva/sclera: Conjunctivae normal.      Pupils: Pupils are equal, round, and reactive to light. Cardiovascular:      Rate and Rhythm: Normal rate and regular rhythm. Heart sounds: Normal heart sounds, S1 normal and S2 normal.   Pulmonary:      Effort: Pulmonary effort is normal.      Breath sounds: Normal breath sounds. Abdominal:      General: Bowel sounds are normal.      Palpations: Abdomen is soft. Musculoskeletal:         General: Normal range of motion. Cervical back: Normal range of motion and neck supple. Skin:     General: Skin is warm and dry. Neurological:      Mental Status: She is alert and oriented to person, place, and time. Psychiatric:         Behavior: Behavior normal.       /60   Pulse 79   Temp 96.6 °F (35.9 °C) (Temporal)   Ht 5' 2\" (1.575 m)   Wt 172 lb 12.8 oz (78.4 kg)   LMP  (LMP Unknown)   SpO2 96%   BMI 31.61 kg/m²     Assessment:       Diagnosis Orders   1. Type 2 diabetes mellitus without complication, without long-term current use of insulin ([facility])  POCT glycosylated hemoglobin (Hb A1C)    Hemoglobin A1C    Comprehensive Metabolic Panel    Lipid Panel    Microalbumin / Creatinine Urine Ratio   2. Essential hypertension  Hemoglobin A1C    Comprehensive Metabolic Panel    Lipid Panel    Microalbumin / Creatinine Urine Ratio   3. Acquired hypothyroidism  TSH without Reflex   4. OAB (overactive bladder)  oxybutynin (DITROPAN XL) 10 MG extended release tablet   5.  JOSE (obstructive sleep apnea) Home Sleep Study   6. Routine general medical examination at a health care facility           Plan:   More than 50% of the time was spent counseling and coordinating care for a total time of 30 min face to face. Begin oxybutynin. Sleep study ordered. Resume Metformin. Follow-up in 3 months with labs prior to visit. PDMP Monitoring:    Last PDMP Charly as Reviewed [facility]):  Review User Review Instant Review Result            Urine Drug Screenings (1 yr)    No resulted procedures found. Medication Contract and Consent for Opioid Use Documents Filed      No documents found                 Patient given educational materials -see patient instructions. Discussed use, benefit, and side effects of prescribed medications. All patient questions answered. Pt voiced understanding. Reviewed health maintenance. Instructed to continue currentmedications, diet and exercise. Patient agreed with treatment plan. Follow up as directed. MEDICATIONS:  Orders Placed This Encounter   Medications    metFORMIN (GLUCOPHAGE) 1000 MG tablet     Sig: Take 1 tablet by mouth 2 times daily (with meals)     Dispense:  60 tablet     Refill:  5    nitroGLYCERIN (NITROSTAT) 0.3 MG SL tablet     Sig: Place 1 tablet under the tongue every 5 minutes as needed for Chest pain up to max of 3 total doses. If no relief after 1 dose, call 911. Dispense:  30 tablet     Refill:  3    oxybutynin (DITROPAN XL) 10 MG extended release tablet     Sig: Take 1 tablet by mouth daily     Dispense:  30 tablet     Refill:  3         ORDERS:  Orders Placed This Encounter   Procedures    Hemoglobin A1C    Comprehensive Metabolic Panel    Lipid Panel    Microalbumin / Creatinine Urine Ratio    TSH without Reflex    POCT glycosylated hemoglobin (Hb A1C)    Home Sleep Study       Follow-up:  Return for Medicare Annual Wellness Visit in 1 year.     PATIENT INSTRUCTIONS:  Patient Instructions     Personalized Preventive Plan for Quince Pleas Hyacinth Taylor - 11/23/2021  Medicare offers a range of preventive health benefits. Some of the tests and screenings are paid in full while other may be subject to a deductible, co-insurance, and/or copay. Some of these benefits include a comprehensive review of your medical history including lifestyle, illnesses that may run in your family, and various assessments and screenings as appropriate. After reviewing your medical record and screening and assessments performed today your provider may have ordered immunizations, labs, imaging, and/or referrals for you. A list of these orders (if applicable) as well as your Preventive Care list are included within your After Visit Summary for your review. Other Preventive Recommendations:    · A preventive eye exam performed by an eye specialist is recommended every 1-2 years to screen for glaucoma; cataracts, macular degeneration, and other eye disorders. · A preventive dental visit is recommended every 6 months. · Try to get at least 150 minutes of exercise per week or 10,000 steps per day on a pedometer . · Order or download the FREE \"Exercise & Physical Activity: Your Everyday Guide\" from [employer]. Call 9-294.393.7492 or search [employer] online. · You need 0949-1206 mg of calcium and 1910-3550 IU of vitamin D per day. It is possible to meet your calcium requirement with diet alone, but a vitamin D supplement is usually necessary to meet this goal.  · When exposed to the sun, use a sunscreen that protects against both UVA and UVB radiation with an SPF of 30 or greater. Reapply every 2 to 3 hours or after sweating, drying off with a towel, or swimming. · Always wear a seat belt when traveling in a car. Always wear a helmet when riding a bicycle or motorcycle.     Electronically signed by MIN Nichole on 11/23/2021 at 3:16 PM    EMR Dragon/transcription disclaimer:  Much of thisencounter note is electronic transcription/translation of spoken language to printed texts. The electronic translation of spoken language may be erroneous, or at times, nonsensical words or phrases may be inadvertentlytranscribed. Although I have reviewed the note for such errors, some may still exist.        Medicare Annual Wellness Visit  Name: Ramy Chisholm Date: 2021   MRN: [id number] Sex: Female   Age: 77 y.o. Ethnicity: Non- / Non    : 1945 Race: White (non-)      Jc Hinton is here for Medicare AWV    Screenings for behavioral, psychosocial and functional/safety risks, and cognitive dysfunction are all negative except as indicated below. These results, as well as other patient data from the [address] form, are documented in Flowsheets linked to this Encounter. Allergies   Allergen Reactions    Bee Venom Shortness Of Breath and Swelling    Adhesive Tape Rash         Prior to Visit Medications    Medication Sig Taking? Authorizing Provider   metFORMIN (GLUCOPHAGE) 1000 MG tablet Take 1 tablet by mouth 2 times daily (with meals) Yes MIN Turner   nitroGLYCERIN (NITROSTAT) 0.3 MG SL tablet Place 1 tablet under the tongue every 5 minutes as needed for Chest pain up to max of 3 total doses. If no relief after 1 dose, call 911.  Yes MIN Turner   oxybutynin (DITROPAN XL) 10 MG extended release tablet Take 1 tablet by mouth daily Yes MIN Turner   alendronate (FOSAMAX) 70 MG tablet TAKE 1 TABLET BY MOUTH ONE TIME PER WEEK Yes MIN Turner   levothyroxine (SYNTHROID) 150 MCG tablet TAKE 1 TABLET BY MOUTH EVERY DAY Yes MIN Turner   JARDIANCE 25 MG tablet TAKE 1 TABLET BY MOUTH EVERY DAY Yes MIN Turner   fluconazole (DIFLUCAN) 100 MG tablet TAKE 1 TABLET BY MOUTH EVERY DAY FOR 7 DAYS Yes Historical Provider, MD   venlafaxine (EFFEXOR XR) 150 MG extended release capsule TAKE 1 CAPSULE BY MOUTH EVERY DAY Yes Luis Coe MIN Khan   lisinopril (PRINIVIL;ZESTRIL) 10 MG tablet TAKE 1 TABLET BY MOUTH EVERY DAY Yes MIN Poe   glimepiride (AMARYL) 4 MG tablet TAKE 1 TABLET BY MOUTH TWICE A DAY Yes MIN Poe   clopidogrel (PLAVIX) 75 MG tablet TAKE 1 TABLET BY MOUTH EVERY DAY Yes Kyle Soria MD   lovastatin (MEVACOR) 40 MG tablet TAKE 1 TABLET BY MOUTH AT BEDTIME Yes Kyle Soria MD   aspirin 81 MG tablet Take 81 mg by mouth daily Yes Historical Provider, MD   vitamin D (CHOLECALCIFEROL) 1000 UNIT TABS tablet Take 1,000 Units by mouth daily Yes Historical Provider, MD   vitamin B-12 (CYANOCOBALAMIN) 1000 MCG tablet Take 5,000 mcg by mouth daily  Yes Historical Provider, MD   Pediatric Multivitamins-Fl (MULT-VITAMIN/FLUORIDE PO) Take by mouth daily  Yes Historical Provider, MD         Past Medical History:   Diagnosis Date    CAD (coronary artery disease)     [facility] [address]     CPAP (continuous positive airway pressure) dependence     11cm    Depression     Diabetes ([facility] [address]     Fibromyalgia     Hypothyroidism     JOSE (obstructive sleep apnea)     AHI:  27.3 per PSG, 2011/CPAP    PLMD (periodic limb movement disorder)     RLS (restless legs syndrome)        Past Surgical History:   Procedure Laterality Date    COLONOSCOPY  12/04/2014    Dr Braydon Ma- no polyps    COLONOSCOPY  07/25/2019    Dr Keli Sheldon, 5 yr recall    COLONOSCOPY N/A 7/25/2019    COLORECTAL CANCER SCREENING, NOT HIGH RISK performed by Gee Harper MD at [address] [place] [address] OTHER SURGICAL HISTORY      teeth extractions    PTCA      with stents    TUBAL LIGATION           Family History   Problem Relation Age of Onset    Colon Cancer Neg Hx     Colon Polyps Neg Hx     Esophageal Cancer Neg Hx     Liver Cancer Neg Hx     Liver Disease Neg Hx     Stomach Cancer Neg Hx     Rectal Cancer Neg Hx        CareTeam (Including outside providers/suppliers regularly involved in providing care):   Patient Care Team:  MIN Ulloa as PCP - General (Family Nurse Practitioner)  MIN Ulloa as PCP - [facility] Empaneled Provider  MIN Chu as Advanced Practice Nurse (Gastroenterology)    Wt Readings from Last 3 Encounters:   11/23/21 172 lb 12.8 oz (78.4 kg)   07/07/21 169 lb 6.4 oz (76.8 kg)   03/03/21 169 lb (76.7 kg)     No flowsheet data found. Body mass index is 31.61 kg/m². Based upon direct observation of the patient, evaluation of cognition reveals recent and remote memory intact. General Appearance: alert and oriented to person, place and time, well developed and well- nourished, in no acute distress  Skin: warm and dry, no rash or erythema  Head: normocephalic and atraumatic  Eyes: pupils equal, round, and reactive to light, extraocular eye movements intact, conjunctivae normal  ENT: tympanic membrane, external ear and ear canal normal bilaterally, nose without deformity, nasal mucosa and turbinates normal without polyps  Neck: supple and non-tender without mass, no thyromegaly or thyroid nodules, no cervical lymphadenopathy  Pulmonary/Chest: clear to auscultation bilaterally- no wheezes, rales or rhonchi, normal air movement, no respiratory distress  Cardiovascular: normal rate, regular rhythm, normal S1 and S2, no murmurs, rubs, clicks, or gallops, distal pulses intact, no carotid bruits  Abdomen: soft, non-tender, non-distended, normal bowel sounds, no masses or organomegaly  Extremities: no cyanosis, clubbing or edema  Musculoskeletal: normal range of motion, no joint swelling, deformity or tenderness  Neurologic: reflexes normal and symmetric, no cranial nerve deficit, gait, coordination and speech normal    Patient's complete Health Risk Assessment and screening values have been reviewed and are found in Flowsheets. The following problems were reviewed today and where indicated follow up appointments were made and/or referrals ordered.     Positive Risk Factor Screenings adjuvanted, 65 yrs +, IM, PF (Fluad) 11/18/2020    Influenza, Triv, inactivated, subunit, adjuvanted, IM (Fluad 65 yrs and older) 11/13/2019    Pneumococcal Conjugate 13-valent ([facility] [place]) 11/01/2011, 01/03/2019    Pneumococcal Polysaccharide ([id number]) 11/01/2003, 01/08/2019    Tdap (Boostrix, Adacel) 06/09/2015        Health Maintenance   Topic Date Due    Shingles Vaccine (1 of 2) Never done    Diabetic foot exam  09/20/2019    Flu vaccine (1) 09/01/2021    Diabetic retinal exam  09/10/2021    Annual Wellness Visit (AWV)  11/19/2021    COVID-19 Vaccine (1) 11/30/2022 (Originally 1/16/1957)    Lipid screen  02/05/2022    TSH testing  07/26/2022    Potassium monitoring  07/26/2022    Creatinine monitoring  07/26/2022    A1C test (Diabetic or Prediabetic)  11/23/2022    Colon cancer screen colonoscopy  07/25/2024    DTaP/Tdap/Td vaccine (2 - Td or Tdap) 06/09/2025    DEXA (modify frequency per FRAX score)  Completed    Pneumococcal 65+ years Vaccine  Completed    Hepatitis C screen  Completed    Hepatitis A vaccine  Aged Out    Hib vaccine  Aged Out    Meningococcal (ACWY) vaccine  Aged Out     Recommendations for Reading Room Due: see orders and patient instructions/AVS.  . Recommended screening schedule for the next 5-10 years is provided to the patient in written form: see Patient Florance Peak was seen today for medicare awv. Diagnoses and all orders for this visit:    Type 2 diabetes mellitus without complication, without long-term current use of insulin (HCC)  -     POCT glycosylated hemoglobin (Hb A1C)  -     Hemoglobin A1C; Future  -     Comprehensive Metabolic Panel; Future  -     Lipid Panel; Future  -     Microalbumin / Creatinine Urine Ratio; Future    Essential hypertension  -     Hemoglobin A1C; Future  -     Comprehensive Metabolic Panel; Future  -     Lipid Panel; Future  -     Microalbumin / Creatinine Urine Ratio;  Future    Acquired hypothyroidism  -     TSH without Reflex; Future    OAB (overactive bladder)  -     oxybutynin (DITROPAN XL) 10 MG extended release tablet; Take 1 tablet by mouth daily    JOSE (obstructive sleep apnea)  -     Home Sleep Study; Future    Routine general medical examination at a health care facility    Other orders  -     metFORMIN (GLUCOPHAGE) 1000 MG tablet; Take 1 tablet by mouth 2 times daily (with meals)  -     nitroGLYCERIN (NITROSTAT) 0.3 MG SL tablet; Place 1 tablet under the tongue every 5 minutes as needed for Chest pain up to max of 3 total doses. If no relief after 1 dose, call 911. Melanie Ortiz is a 77 y.o. female being evaluated by a Virtual Visit (video and audio) encounter to address concerns as mentioned above. A caregiver was present when appropriate. Due to this being a TeleHealth encounter (During [facility] public health emergency), evaluation of the following organ systems was limited: Vitals/Constitutional/EENT/Resp/CV/GI//MS/Neuro/Skin/Heme-Lymph-Imm. Pursuant to the emergency declaration under the [address] [place] [address], [address] [place] [address] authority and the [employer] Act, this Virtual Visit was conducted with patient's (and/or legal guardian's) consent, to reduce the patient's risk of exposure to COVID-19 and provide necessary medical care. The patient (and/or legal guardian) has also been advised to contact this office for worsening conditions or problems, and seek emergency medical treatment and/or call 911 if deemed necessary. Patient identification was verified at the start of the visit: Yes    Services were provided through a video synchronous discussion virtually to substitute for in-person clinic visit. Patient and provider were located at their individual homes. --MIN Serrano on 11/23/2021 at 3:16 PM    An electronic signature was used to authenticate this note.

## 2021-11-30 DIAGNOSIS — E11.8 TYPE 2 DIABETES MELLITUS WITH COMPLICATION, WITHOUT LONG-TERM CURRENT USE OF INSULIN (HCC): ICD-10-CM

## 2021-11-30 RX ORDER — GLIMEPIRIDE 4 MG/1
TABLET ORAL
Qty: 180 TABLET | Refills: 3 | Status: SHIPPED | OUTPATIENT
Start: 2021-11-30

## 2021-12-06 DIAGNOSIS — Z13.31 POSITIVE DEPRESSION SCREENING: ICD-10-CM

## 2021-12-06 DIAGNOSIS — I10 ESSENTIAL HYPERTENSION: ICD-10-CM

## 2021-12-06 RX ORDER — VENLAFAXINE HYDROCHLORIDE 150 MG/1
CAPSULE, EXTENDED RELEASE ORAL
Qty: 90 CAPSULE | Refills: 1 | Status: SHIPPED | OUTPATIENT
Start: 2021-12-06 | End: 2022-06-01

## 2021-12-06 RX ORDER — LISINOPRIL 10 MG/1
TABLET ORAL
Qty: 90 TABLET | Refills: 1 | Status: SHIPPED | OUTPATIENT
Start: 2021-12-06 | End: 2022-06-01

## 2021-12-06 NOTE — TELEPHONE ENCOUNTER
Shazia Isreal called to request a refill on her medication.       Last office visit : 11/23/2021   Next office visit : 2/23/2022     Requested Prescriptions     Signed Prescriptions Disp Refills    lisinopril (PRINIVIL;ZESTRIL) 10 MG tablet 90 tablet 1     Sig: TAKE 1 TABLET BY MOUTH EVERY DAY     Authorizing Provider: Simeon Cantrell User: Sonny Baig    venlafaxine (EFFEXOR XR) 150 MG extended release capsule 90 capsule 1     Sig: TAKE 1 CAPSULE BY MOUTH EVERY DAY     Authorizing Provider: Simeon Cantrell User: Bryant Ayers MA

## 2022-01-21 RX ORDER — EMPAGLIFLOZIN 25 MG/1
TABLET, FILM COATED ORAL
Qty: 90 TABLET | Refills: 1 | Status: SHIPPED | OUTPATIENT
Start: 2022-01-21 | End: 2022-07-28

## 2022-02-16 DIAGNOSIS — E11.9 TYPE 2 DIABETES MELLITUS WITHOUT COMPLICATION, WITHOUT LONG-TERM CURRENT USE OF INSULIN (HCC): ICD-10-CM

## 2022-02-16 DIAGNOSIS — I10 ESSENTIAL HYPERTENSION: ICD-10-CM

## 2022-02-16 DIAGNOSIS — E03.9 ACQUIRED HYPOTHYROIDISM: ICD-10-CM

## 2022-02-16 LAB
ALBUMIN SERPL-MCNC: 4.2 G/DL (ref 3.5–5.2)
ALP BLD-CCNC: 75 U/L (ref 35–104)
ALT SERPL-CCNC: 23 U/L (ref 5–33)
ANION GAP SERPL CALCULATED.3IONS-SCNC: 15 MMOL/L (ref 7–19)
AST SERPL-CCNC: 20 U/L (ref 5–32)
BILIRUB SERPL-MCNC: 0.4 MG/DL (ref 0.2–1.2)
BUN BLDV-MCNC: 13 MG/DL (ref 8–23)
CALCIUM SERPL-MCNC: 9.5 MG/DL (ref 8.8–10.2)
CHLORIDE BLD-SCNC: 101 MMOL/L (ref 98–111)
CHOLESTEROL, TOTAL: 233 MG/DL (ref 160–199)
CO2: 23 MMOL/L (ref 22–29)
CREAT SERPL-MCNC: 0.7 MG/DL (ref 0.5–0.9)
CREATININE URINE: 104.5 MG/DL (ref 4.2–622)
GFR AFRICAN AMERICAN: >59
GFR NON-AFRICAN AMERICAN: >60
GLUCOSE BLD-MCNC: 177 MG/DL (ref 74–109)
HBA1C MFR BLD: 8.6 % (ref 4–6)
HDLC SERPL-MCNC: 62 MG/DL (ref 65–121)
LDL CHOLESTEROL CALCULATED: 120 MG/DL
MICROALBUMIN UR-MCNC: <1.2 MG/DL (ref 0–19)
MICROALBUMIN/CREAT UR-RTO: NORMAL MG/G
POTASSIUM SERPL-SCNC: 4.1 MMOL/L (ref 3.5–5)
SODIUM BLD-SCNC: 139 MMOL/L (ref 136–145)
TOTAL PROTEIN: 6.8 G/DL (ref 6.6–8.7)
TRIGL SERPL-MCNC: 255 MG/DL (ref 0–149)
TSH SERPL DL<=0.05 MIU/L-ACNC: 47.49 UIU/ML (ref 0.27–4.2)

## 2022-02-17 RX ORDER — LEVOTHYROXINE SODIUM 0.15 MG/1
TABLET ORAL
Qty: 90 TABLET | Refills: 1 | Status: SHIPPED | OUTPATIENT
Start: 2022-02-17 | End: 2022-08-16

## 2022-02-22 ENCOUNTER — TELEPHONE (OUTPATIENT)
Dept: PRIMARY CARE CLINIC | Age: 77
End: 2022-02-22

## 2022-02-22 NOTE — TELEPHONE ENCOUNTER
----- Message from MIN Yancey sent at 2/21/2022 10:38 AM CST -----  Is this patient taking her thyroid medication?

## 2022-02-23 ENCOUNTER — OFFICE VISIT (OUTPATIENT)
Dept: PRIMARY CARE CLINIC | Age: 77
End: 2022-02-23
Payer: MEDICARE

## 2022-02-23 VITALS
OXYGEN SATURATION: 96 % | WEIGHT: 171.6 LBS | DIASTOLIC BLOOD PRESSURE: 62 MMHG | SYSTOLIC BLOOD PRESSURE: 104 MMHG | HEART RATE: 68 BPM | BODY MASS INDEX: 31.58 KG/M2 | TEMPERATURE: 96.9 F | HEIGHT: 62 IN

## 2022-02-23 DIAGNOSIS — E11.9 TYPE 2 DIABETES MELLITUS WITHOUT COMPLICATION, WITHOUT LONG-TERM CURRENT USE OF INSULIN (HCC): ICD-10-CM

## 2022-02-23 DIAGNOSIS — E03.9 ACQUIRED HYPOTHYROIDISM: Primary | ICD-10-CM

## 2022-02-23 DIAGNOSIS — I10 ESSENTIAL HYPERTENSION: ICD-10-CM

## 2022-02-23 PROCEDURE — 1123F ACP DISCUSS/DSCN MKR DOCD: CPT | Performed by: NURSE PRACTITIONER

## 2022-02-23 PROCEDURE — 4040F PNEUMOC VAC/ADMIN/RCVD: CPT | Performed by: NURSE PRACTITIONER

## 2022-02-23 PROCEDURE — G8399 PT W/DXA RESULTS DOCUMENT: HCPCS | Performed by: NURSE PRACTITIONER

## 2022-02-23 PROCEDURE — G8427 DOCREV CUR MEDS BY ELIG CLIN: HCPCS | Performed by: NURSE PRACTITIONER

## 2022-02-23 PROCEDURE — 1090F PRES/ABSN URINE INCON ASSESS: CPT | Performed by: NURSE PRACTITIONER

## 2022-02-23 PROCEDURE — G8417 CALC BMI ABV UP PARAM F/U: HCPCS | Performed by: NURSE PRACTITIONER

## 2022-02-23 PROCEDURE — 3052F HG A1C>EQUAL 8.0%<EQUAL 9.0%: CPT | Performed by: NURSE PRACTITIONER

## 2022-02-23 PROCEDURE — G8484 FLU IMMUNIZE NO ADMIN: HCPCS | Performed by: NURSE PRACTITIONER

## 2022-02-23 PROCEDURE — 99214 OFFICE O/P EST MOD 30 MIN: CPT | Performed by: NURSE PRACTITIONER

## 2022-02-23 PROCEDURE — 1036F TOBACCO NON-USER: CPT | Performed by: NURSE PRACTITIONER

## 2022-02-23 ASSESSMENT — ENCOUNTER SYMPTOMS
SHORTNESS OF BREATH: 0
VOMITING: 0
COUGH: 0
VOICE CHANGE: 0
RHINORRHEA: 0
BACK PAIN: 0
PHOTOPHOBIA: 0
COLOR CHANGE: 0
NAUSEA: 0

## 2022-02-23 ASSESSMENT — PATIENT HEALTH QUESTIONNAIRE - PHQ9
SUM OF ALL RESPONSES TO PHQ9 QUESTIONS 1 & 2: 0
SUM OF ALL RESPONSES TO PHQ QUESTIONS 1-9: 0
1. LITTLE INTEREST OR PLEASURE IN DOING THINGS: 0
SUM OF ALL RESPONSES TO PHQ QUESTIONS 1-9: 0
2. FEELING DOWN, DEPRESSED OR HOPELESS: 0
SUM OF ALL RESPONSES TO PHQ QUESTIONS 1-9: 0
SUM OF ALL RESPONSES TO PHQ QUESTIONS 1-9: 0

## 2022-02-23 NOTE — PROGRESS NOTES
[address] [facility]  [address] [address]  [address] [address]  Dept: [phone]  Dept Fax: [phone]  Loc: [phone]    Alli Arrieta is a 69 y.o. female who presents today for her medical conditions/complaints as noted below. Alli Arrieta is c/o of Diabetes (not as good as it should be) and Hypertension        HPI:     HPI   Chief Complaint   Patient presents with    Diabetes     not as good as it should be    Hypertension     Patient presents today for follow-up hypertension, hypothyroidism, diabetes and hyperlipidemia. A1C is 8.6 which is unchanged. She states she is trying to make dietary changes; she is very social and she goes out to eat often. Blood pressure is well-controlled with medications. Recent TSH was elevated; she is suppsed to be taking levothyroxine 150 mcg. She states hearing is getting worse; she has an appointment tomorrow for hearing aids. She is making appointment for eye exam; she is afraid she has cataracts. Also, she is preparing herself for dentures eventually and is making dental appointment for this.      Lab Results   Component Value Date    TSH 47.490 (H) 02/16/2022     Lab Results   Component Value Date    LABA1C 8.6 (H) 02/16/2022     No results found for: EAG  Lab Results   Component Value Date    CHOL 233 (H) 02/16/2022    CHOL 191 02/05/2021    CHOL 158 (L) 07/17/2020     Lab Results   Component Value Date    TRIG 255 (H) 02/16/2022    TRIG 197 (H) 02/05/2021    TRIG 184 (H) 07/17/2020     Lab Results   Component Value Date    HDL 62 (L) 02/16/2022    HDL 60 (L) 02/05/2021    HDL 57 (L) 07/17/2020     Lab Results   Component Value Date    LDLCALC 120 02/16/2022    LDLCALC 92 02/05/2021    LDLCALC 64 07/17/2020     No results found for: LABVLDL, VLDL  No results found for: CHOLHDLRATIO  Lab Results   Component Value Date     02/16/2022    K 4.1 02/16/2022     02/16/2022    CO2 23 02/16/2022    BUN 13 02/16/2022    CREATININE 0.7 02/16/2022    GLUCOSE 177 (H) 02/16/2022    CALCIUM 9.5 02/16/2022    PROT 6.8 02/16/2022    LABALBU 4.2 02/16/2022    BILITOT 0.4 02/16/2022    ALKPHOS 75 02/16/2022    AST 20 02/16/2022    ALT 23 02/16/2022    LABGLOM >60 02/16/2022    GFRAA >59 02/16/2022    GLOB 2.3 03/13/2017     ,    Past Medical History:   Diagnosis Date    CAD (coronary artery disease)     Carotid artery disease (HCC)     CPAP (continuous positive airway pressure) dependence     11cm    Depression     Diabetes ([address]     Fibromyalgia     Hypothyroidism     JOSE (obstructive sleep apnea)     AHI:  27.3 per PSG, 2011/CPAP    PLMD (periodic limb movement disorder)     RLS (restless legs syndrome)       Past Surgical History:   Procedure Laterality Date    COLONOSCOPY  12/04/2014    Dr Ruby Salmeron- no polyps    COLONOSCOPY  07/25/2019    Dr Basil Middleton, 5 yr recall    COLONOSCOPY N/A 7/25/2019    COLORECTAL CANCER SCREENING, NOT HIGH RISK performed by Juvencio Oconnor MD at [facility] OR    OTHER SURGICAL HISTORY      teeth extractions    PTCA      with stents    TUBAL LIGATION         Vitals 2/23/2022 11/23/2021 7/7/2021 7/7/2021 3/3/2021 08/54/0641   SYSTOLIC 101 702 807 84 507 791   DIASTOLIC 62 60 64 60 64 68   Site - - - - - -   Position - - - - - -   Pulse 68 79 - 91 80 78   Temp 96.9 96.6 - 97.2 98.1 97.2   Resp - - - 16 16 -   SpO2 96 96 - 97 98 98   Weight 171 lb 9.6 oz 172 lb 12.8 oz - 169 lb 6.4 oz 169 lb 175 lb   Height 5' 2\" 5' 2\" - 5' 2\" 5' 2\" 5' 2\"   Body mass index 31.38 kg/m2 31.6 kg/m2 - 30.98 kg/m2 30.91 kg/m2 32 kg/m2   Some recent data might be hidden       Family History   Problem Relation Age of Onset    Colon Cancer Neg Hx     Colon Polyps Neg Hx     Esophageal Cancer Neg Hx     Liver Cancer Neg Hx     Liver Disease Neg Hx     Stomach Cancer Neg Hx     Rectal Cancer Neg Hx        Social History     Tobacco Use    Smoking status: Never Smoker    Smokeless tobacco: Never Used Substance Use Topics    Alcohol use: No      Current Outpatient Medications on File Prior to Visit   Medication Sig Dispense Refill    levothyroxine (SYNTHROID) 150 MCG tablet TAKE 1 TABLET BY MOUTH EVERY DAY 90 tablet 1    oxybutynin (DITROPAN-XL) 10 MG extended release tablet TAKE 1 TABLET BY MOUTH EVERY DAY 90 tablet 0    JARDIANCE 25 MG tablet TAKE 1 TABLET BY MOUTH EVERY DAY 90 tablet 1    lisinopril (PRINIVIL;ZESTRIL) 10 MG tablet TAKE 1 TABLET BY MOUTH EVERY DAY 90 tablet 1    venlafaxine (EFFEXOR XR) 150 MG extended release capsule TAKE 1 CAPSULE BY MOUTH EVERY DAY 90 capsule 1    glimepiride (AMARYL) 4 MG tablet TAKE 1 TABLET BY MOUTH TWICE A  tablet 3    metFORMIN (GLUCOPHAGE) 1000 MG tablet Take 1 tablet by mouth 2 times daily (with meals) 60 tablet 5    nitroGLYCERIN (NITROSTAT) 0.3 MG SL tablet Place 1 tablet under the tongue every 5 minutes as needed for Chest pain up to max of 3 total doses. If no relief after 1 dose, call 911. 30 tablet 3    alendronate (FOSAMAX) 70 MG tablet TAKE 1 TABLET BY MOUTH ONE TIME PER WEEK 12 tablet 3    fluconazole (DIFLUCAN) 100 MG tablet TAKE 1 TABLET BY MOUTH EVERY DAY FOR 7 DAYS      clopidogrel (PLAVIX) 75 MG tablet TAKE 1 TABLET BY MOUTH EVERY DAY 30 tablet 5    lovastatin (MEVACOR) 40 MG tablet TAKE 1 TABLET BY MOUTH AT BEDTIME 90 tablet 3    aspirin 81 MG tablet Take 81 mg by mouth daily      vitamin D (CHOLECALCIFEROL) 1000 UNIT TABS tablet Take 1,000 Units by mouth daily      vitamin B-12 (CYANOCOBALAMIN) 1000 MCG tablet Take 5,000 mcg by mouth daily       Pediatric Multivitamins-Fl (MULT-VITAMIN/FLUORIDE PO) Take by mouth daily        No current facility-administered medications on file prior to visit.      Allergies   Allergen Reactions    Bee Venom Shortness Of Breath and Swelling    Adhesive Tape Rash       Health Maintenance   Topic Date Due    Shingles Vaccine (1 of 2) Never done    COVID-19 Vaccine (1) 11/30/2022 (Originally 1/16/1950)    Flu vaccine (1) 02/23/2023 (Originally 9/1/2021)    Depression Screen  11/23/2022    Annual Wellness Visit (AWV)  11/24/2022    Lipid screen  02/16/2023    TSH testing  02/16/2023    Potassium monitoring  02/16/2023    Creatinine monitoring  02/16/2023    DTaP/Tdap/Td vaccine (2 - Td or Tdap) 06/09/2025    DEXA (modify frequency per FRAX score)  Completed    Pneumococcal 65+ years Vaccine  Completed    Hepatitis C screen  Completed    Hepatitis A vaccine  Aged Out    Hib vaccine  Aged Out    Meningococcal (ACWY) vaccine  Aged Out       Subjective:      Review of Systems   Constitutional: Negative for chills and fever. HENT: Positive for hearing loss. Negative for ear pain, rhinorrhea and voice change. Eyes: Negative for photophobia and visual disturbance. Respiratory: Negative for cough and shortness of breath. Cardiovascular: Negative for chest pain and palpitations. Gastrointestinal: Negative for nausea and vomiting. Endocrine: Negative. Negative for cold intolerance and heat intolerance. Genitourinary: Negative for difficulty urinating and flank pain. Musculoskeletal: Negative for back pain and neck pain. Skin: Negative for color change and rash. Allergic/Immunologic: Negative for environmental allergies and food allergies. Neurological: Negative for dizziness, speech difficulty and headaches. Hematological: Does not bruise/bleed easily. Psychiatric/Behavioral: Negative for sleep disturbance and suicidal ideas. Objective:     Physical Exam  Vitals and nursing note reviewed. Constitutional:       Appearance: She is well-developed. HENT:      Head: Atraumatic. Right Ear: External ear normal.      Left Ear: External ear normal.      Nose: Nose normal.   Eyes:      Conjunctiva/sclera: Conjunctivae normal.      Pupils: Pupils are equal, round, and reactive to light. Cardiovascular:      Rate and Rhythm: Normal rate and regular rhythm.       Heart sounds: Normal heart sounds, S1 normal and S2 normal.   Pulmonary:      Effort: Pulmonary effort is normal.      Breath sounds: Normal breath sounds. Abdominal:      General: Bowel sounds are normal.      Palpations: Abdomen is soft. Musculoskeletal:         General: Normal range of motion. Cervical back: Normal range of motion and neck supple. Skin:     General: Skin is warm and dry. Neurological:      Mental Status: She is alert and oriented to person, place, and time. Psychiatric:         Behavior: Behavior normal.       /62   Pulse 68   Temp 96.9 °F (36.1 °C) (Temporal)   Ht 5' 2\" (1.575 m)   Wt 171 lb 9.6 oz (77.8 kg)   LMP  (LMP Unknown)   SpO2 96%   BMI 31.39 kg/m²     Assessment:       Diagnosis Orders   1. Acquired hypothyroidism  TSH   2. Essential hypertension     3. Type 2 diabetes mellitus without complication, without long-term current use of insulin ([facility]           Plan:       More than 50% of the time was spent counseling and coordinating care for a total time of 30 min face to face. Patient is going to work on diet for 3 months; she is agreeable that if no improvement in A1C she would like to try a GLP-1.     1. Return in 4-6 weeks to recheck TSH. No appointment needed. 2. Take 150 mcg levothyroxine every morning before other meds or food. 3. Follow diabetic diet; if not improving in 3 months would consider adding additional diabetic medication. 4. Follow-up in 3 months or sooner if needed. PDMP Monitoring:    Last PDMP Ch[place] as Reviewed [facility]):  Review User Review Instant Review Result            Urine Drug Screenings (1 yr)    No resulted procedures found. Medication Contract and Consent for Opioid Use Documents Filed      No documents found                 Patient given educational materials -see patient instructions. Discussed use, benefit, and side effects of prescribed medications. All patient questions answered. Pt voiced understanding.  Reviewed health maintenance. Instructed to continue currentmedications, diet and exercise. Patient agreed with treatment plan. Follow up as directed. MEDICATIONS:  No orders of the defined types were placed in this encounter. ORDERS:  Orders Placed This Encounter   Procedures    TSH       Follow-up:  Return in about 6 months (around 8/23/2022) for in office. PATIENT INSTRUCTIONS:  Patient Instructions   1. Return in 4-6 weeks to recheck TSH. No appointment needed. 2. Take 150 mcg levothyroxine every morning before other meds or food. 3. Follow diabetic diet; if not improving in 3 months would consider adding additional diabetic medication. 4. Follow-up in 3 months or sooner if needed. Electronically signed by MIN Barrera on 2/23/2022 at 11:12 AM    EMR Dragon/transcription disclaimer:  Much of thisencounter note is electronic transcription/translation of spoken language to printed texts. The electronic translation of spoken language may be erroneous, or at times, nonsensical words or phrases may be inadvertentlytranscribed.   Although I have reviewed the note for such errors, some may still exist.

## 2022-02-23 NOTE — PATIENT INSTRUCTIONS
1. Return in 4-6 weeks to recheck TSH. No appointment needed. 2. Take 150 mcg levothyroxine every morning before other meds or food. 3. Follow diabetic diet; if not improving in 3 months would consider adding additional diabetic medication. 4. Follow-up in 3 months or sooner if needed.

## 2022-04-06 DIAGNOSIS — E03.9 ACQUIRED HYPOTHYROIDISM: ICD-10-CM

## 2022-04-06 LAB — TSH SERPL DL<=0.05 MIU/L-ACNC: 0.21 UIU/ML (ref 0.27–4.2)

## 2022-04-08 ENCOUNTER — TELEPHONE (OUTPATIENT)
Dept: PRIMARY CARE CLINIC | Age: 77
End: 2022-04-08

## 2022-04-08 NOTE — TELEPHONE ENCOUNTER
----- Message from Vijaya Degree sent at 4/7/2022  3:06 PM CDT -----  Subject: Message to Provider    QUESTIONS  Information for Provider? Patient calling wanting to know her A1C results. Please call patient with results  ---------------------------------------------------------------------------  --------------  CALL BACK INFO  What is the best way for the office to contact you? OK to leave message on   voicemail  Preferred Call Back Phone Number? [phone]  ---------------------------------------------------------------------------  --------------  SCRIPT ANSWERS  Relationship to Patient?  Self

## 2022-04-08 NOTE — TELEPHONE ENCOUNTER
----- Message from Samuel Sharp sent at 4/7/2022  3:06 PM CDT -----  Subject: Message to Provider    QUESTIONS  Information for Provider? Patient calling wanting to know her A1C results. Please call patient with results  ---------------------------------------------------------------------------  --------------  CALL BACK INFO  What is the best way for the office to contact you? OK to leave message on   voicemail  Preferred Call Back Phone Number? [phone]  ---------------------------------------------------------------------------  --------------  SCRIPT ANSWERS  Relationship to Patient?  Self

## 2022-04-12 ENCOUNTER — TELEPHONE (OUTPATIENT)
Dept: PRIMARY CARE CLINIC | Age: 77
End: 2022-04-12

## 2022-04-12 DIAGNOSIS — E03.9 ACQUIRED HYPOTHYROIDISM: Primary | ICD-10-CM

## 2022-04-12 RX ORDER — LEVOTHYROXINE SODIUM 0.12 MG/1
125 TABLET ORAL DAILY
Qty: 30 TABLET | Refills: 2 | Status: SHIPPED | OUTPATIENT
Start: 2022-04-12 | End: 2022-06-16

## 2022-04-12 NOTE — TELEPHONE ENCOUNTER
----- Message from MIN Turpin sent at 4/11/2022  3:19 PM CDT -----  Decrease levothyroxine to 125 mcg; repeat TSH in 4-6 weeks.

## 2022-04-12 NOTE — TELEPHONE ENCOUNTER
Pt expressed verbal understanding on TSH and levothyroxine 125mcg will be sent to [facility] in [facility]  and is in question to her A1C she stated she has called several times an spoke to the nurse and has not recived a clear answer and she is receiving a  Letter from [facility] and they are stating a medication is being denied because our office refuses to complete a form and she  does not feel she is getting the level of care because she has yet to get a foot exam .because she has seen the sign on the door took her shoes off like the sign stated and Ronal COPELAND asked her why she had scott shoes off and has yet to look at her feet she stated . She also feels she needs to be back on metformin and she stated talia agreed and pt stated but she has not been sent in any metformin pt is just very concerned her health care needs are being looked over she stated she and her daughter are looking into other Doctors she will call back if she does change but she hope since she voiced her opinion today with me that her needs can bee checked into .  She stated she really would like to stay with Ronal COPELAND because she really likes her but she has legitimate concerns

## 2022-05-15 DIAGNOSIS — N32.81 OAB (OVERACTIVE BLADDER): ICD-10-CM

## 2022-05-15 RX ORDER — OXYBUTYNIN CHLORIDE 10 MG/1
TABLET, EXTENDED RELEASE ORAL
Qty: 90 TABLET | Refills: 0 | Status: SHIPPED | OUTPATIENT
Start: 2022-05-15 | End: 2022-08-10

## 2022-06-01 DIAGNOSIS — Z13.31 POSITIVE DEPRESSION SCREENING: ICD-10-CM

## 2022-06-01 DIAGNOSIS — I10 ESSENTIAL HYPERTENSION: ICD-10-CM

## 2022-06-01 RX ORDER — LISINOPRIL 10 MG/1
TABLET ORAL
Qty: 90 TABLET | Refills: 1 | Status: SHIPPED | OUTPATIENT
Start: 2022-06-01

## 2022-06-01 RX ORDER — VENLAFAXINE HYDROCHLORIDE 150 MG/1
CAPSULE, EXTENDED RELEASE ORAL
Qty: 90 CAPSULE | Refills: 1 | Status: SHIPPED | OUTPATIENT
Start: 2022-06-01

## 2022-06-11 ENCOUNTER — OFFICE VISIT (OUTPATIENT)
Age: 77
End: 2022-06-11
Payer: MEDICARE

## 2022-06-11 VITALS
SYSTOLIC BLOOD PRESSURE: 102 MMHG | HEIGHT: 60 IN | RESPIRATION RATE: 20 BRPM | HEART RATE: 84 BPM | TEMPERATURE: 98 F | DIASTOLIC BLOOD PRESSURE: 62 MMHG | BODY MASS INDEX: 32.94 KG/M2 | WEIGHT: 167.8 LBS | OXYGEN SATURATION: 95 %

## 2022-06-11 DIAGNOSIS — T63.301A SPIDER BITE WOUND, ACCIDENTAL OR UNINTENTIONAL, INITIAL ENCOUNTER: Primary | ICD-10-CM

## 2022-06-11 PROCEDURE — 99213 OFFICE O/P EST LOW 20 MIN: CPT | Performed by: NURSE PRACTITIONER

## 2022-06-11 PROCEDURE — 1090F PRES/ABSN URINE INCON ASSESS: CPT | Performed by: NURSE PRACTITIONER

## 2022-06-11 PROCEDURE — G8417 CALC BMI ABV UP PARAM F/U: HCPCS | Performed by: NURSE PRACTITIONER

## 2022-06-11 PROCEDURE — G8427 DOCREV CUR MEDS BY ELIG CLIN: HCPCS | Performed by: NURSE PRACTITIONER

## 2022-06-11 PROCEDURE — 1036F TOBACCO NON-USER: CPT | Performed by: NURSE PRACTITIONER

## 2022-06-11 PROCEDURE — 1123F ACP DISCUSS/DSCN MKR DOCD: CPT | Performed by: NURSE PRACTITIONER

## 2022-06-11 PROCEDURE — G8399 PT W/DXA RESULTS DOCUMENT: HCPCS | Performed by: NURSE PRACTITIONER

## 2022-06-11 RX ORDER — CLINDAMYCIN HYDROCHLORIDE 300 MG/1
300 CAPSULE ORAL 3 TIMES DAILY
Qty: 30 CAPSULE | Refills: 0 | Status: SHIPPED | OUTPATIENT
Start: 2022-06-11 | End: 2022-06-21

## 2022-06-11 NOTE — PROGRESS NOTES
[address]  [address] [address] [address] [address]  Dept: [phone]  Dept Fax: [phone]  Loc: [phone]    Hany Love is a 69 y.o. female who presents today for her medical conditions/complaintsas noted below. Hany Love is c/o of Insect Bite (spider bite on right side located by cristinat ,painful )        HPI:     LOLY Handley presents today with redness, swelling, and pain to her right upper arm. Night before last she felt something crawling on her. She reached down and got a red spider off the area. Today the area is worse than yesterday. No fever.    Past Medical History:   Diagnosis Date    CAD (coronary artery disease)     Carotid artery disease (HCC)     CPAP (continuous positive airway pressure) dependence     11cm    Depression     Diabetes (HCC)     Fibromyalgia     Hypothyroidism     JOSE (obstructive sleep apnea)     AHI:  27.3 per PSG, 2011/CPAP    PLMD (periodic limb movement disorder)     RLS (restless legs syndrome)      Past Surgical History:   Procedure Laterality Date    COLONOSCOPY  12/04/2014    Dr Jessica Gardner- no polyps    COLONOSCOPY  07/25/2019    Dr Alicia Moon, 5 yr recall    COLONOSCOPY N/A 7/25/2019    COLORECTAL CANCER SCREENING, NOT HIGH RISK performed by Sowmya Shultz MD at [facility]      teeth extractions    PTCA      with stents    TUBAL LIGATION         Family History   Problem Relation Age of Onset    Colon Cancer Neg Hx     Colon Polyps Neg Hx     Esophageal Cancer Neg Hx     Liver Cancer Neg Hx     Liver Disease Neg Hx     Stomach Cancer Neg Hx     Rectal Cancer Neg Hx        Social History     Tobacco Use    Smoking status: Never Smoker    Smokeless tobacco: Never Used   Substance Use Topics    Alcohol use: No      Current Outpatient Medications   Medication Sig Dispense Refill    clindamycin (CLEOCIN) 300 MG capsule Take 1 capsule by mouth 3 times daily for 10 days 30 capsule 0    venlafaxine (EFFEXOR XR) 150 MG extended release capsule TAKE 1 CAPSULE BY MOUTH EVERY DAY 90 capsule 1    lisinopril (PRINIVIL;ZESTRIL) 10 MG tablet TAKE 1 TABLET BY MOUTH EVERY DAY 90 tablet 1    oxybutynin (DITROPAN-XL) 10 MG extended release tablet TAKE 1 TABLET BY MOUTH EVERY DAY 90 tablet 0    metFORMIN (GLUCOPHAGE) 1000 MG tablet TAKE 1 TABLET BY MOUTH TWICE A DAY WITH MEALS 180 tablet 1    levothyroxine (SYNTHROID) 125 MCG tablet Take 1 tablet by mouth daily 30 tablet 2    levothyroxine (SYNTHROID) 150 MCG tablet TAKE 1 TABLET BY MOUTH EVERY DAY 90 tablet 1    JARDIANCE 25 MG tablet TAKE 1 TABLET BY MOUTH EVERY DAY 90 tablet 1    glimepiride (AMARYL) 4 MG tablet TAKE 1 TABLET BY MOUTH TWICE A  tablet 3    nitroGLYCERIN (NITROSTAT) 0.3 MG SL tablet Place 1 tablet under the tongue every 5 minutes as needed for Chest pain up to max of 3 total doses. If no relief after 1 dose, call 911. 30 tablet 3    alendronate (FOSAMAX) 70 MG tablet TAKE 1 TABLET BY MOUTH ONE TIME PER WEEK 12 tablet 3    fluconazole (DIFLUCAN) 100 MG tablet TAKE 1 TABLET BY MOUTH EVERY DAY FOR 7 DAYS      clopidogrel (PLAVIX) 75 MG tablet TAKE 1 TABLET BY MOUTH EVERY DAY 30 tablet 5    lovastatin (MEVACOR) 40 MG tablet TAKE 1 TABLET BY MOUTH AT BEDTIME 90 tablet 3    aspirin 81 MG tablet Take 81 mg by mouth daily      vitamin D (CHOLECALCIFEROL) 1000 UNIT TABS tablet Take 1,000 Units by mouth daily      vitamin B-12 (CYANOCOBALAMIN) 1000 MCG tablet Take 5,000 mcg by mouth daily       Pediatric Multivitamins-Fl (MULT-VITAMIN/FLUORIDE PO) Take by mouth daily        No current facility-administered medications for this visit.      Allergies   Allergen Reactions    Bee Venom Shortness Of Breath and Swelling    Adhesive Tape Rash       Health Maintenance   Topic Date Due    Shingles vaccine (1 of 2) Never done    COVID-19 Vaccine (1) 11/30/2022 (Originally 1/16/1950)    Flu vaccine (Season Ended) 02/23/2023 (Originally 9/1/2022)    Annual Wellness Visit (AWV)  11/24/2022    Lipids  02/16/2023    Depression Screen  02/23/2023    DTaP/Tdap/Td vaccine (2 - Td or Tdap) 06/09/2025    DEXA (modify frequency per FRAX score)  Completed    Pneumococcal 65+ years Vaccine  Completed    Hepatitis C screen  Completed    Hepatitis A vaccine  Aged Out    Hib vaccine  Aged Out    Meningococcal (ACWY) vaccine  Aged Out       Subjective:     Review of Systems   Constitutional: Negative for chills and fever. Skin: Positive for wound. All other systems reviewed and are negative.      :Objective      Physical Exam  Vitals and nursing note reviewed. Constitutional:       General: She is not in acute distress. Appearance: Normal appearance. She is well-developed. She is not ill-appearing or diaphoretic. HENT:      Head: Normocephalic and atraumatic. Eyes:      Conjunctiva/sclera: Conjunctivae normal.      Pupils: Pupils are equal, round, and reactive to light. Pulmonary:      Effort: Pulmonary effort is normal. No respiratory distress. Musculoskeletal:      Cervical back: Normal range of motion. Skin:     General: Skin is warm and dry. Findings: No rash. Neurological:      Mental Status: She is alert and oriented to person, place, and time. Psychiatric:         Mood and Affect: Mood normal.         Behavior: Behavior normal.       /62   Pulse 84   Temp 98 °F (36.7 °C)   Resp 20   Ht 5' (1.524 m)   Wt 167 lb 12.8 oz (76.1 kg)   LMP  (LMP Unknown)   SpO2 95%   BMI 32.77 kg/m²     :Assessment       Diagnosis Orders   1. Spider bite wound, accidental or unintentional, initial encounter  clindamycin (CLEOCIN) 300 MG capsule       :Plan      1. Antibiotic as prescribed   2. Closely monitor area for worsening redness, swelling, fever, drainage- return to clinic or go to ER if occur   3.  Follow up with PCP as needed      No orders of the defined types were placed in this encounter. No follow-ups on file. Orders Placed This Encounter   Medications    clindamycin (CLEOCIN) 300 MG capsule     Sig: Take 1 capsule by mouth 3 times daily for 10 days     Dispense:  30 capsule     Refill:  0       Patient given educational materials- see patient instructions. Discussed use, benefit, and side effects of prescribedmedications. All patient questions answered. Pt voiced understanding. Patient Instructions       Patient Education        Spider Bite or Scorpion Sting: Care Instructions  Overview     Spider bites and scorpion stings often cause minor swelling, redness, pain, and itching. These mild symptoms are common and may last from a few hours to a fewdays. Some people have more severe reactions. Home treatment is often all that you need to relieve symptoms. Follow-up care is a key part of your treatment and safety. Be sure to make and go to all appointments, and call your doctor if you are having problems. It's also a good idea to know your test results and keep alist of the medicines you take. How can you care for yourself at home?  Put ice or a cold pack on the area for 10 to 20 minutes at a time. Put a thin cloth between the ice and your skin.  Try an over-the-counter medicine for itching, redness, swelling, and pain. Be safe with medicines. Read and follow all instructions on the label. ? Take an over-the-counter antihistamine to help calm the itching or swelling. ? Put a hydrocortisone 1% cream or calamine lotion on the skin.  Don't scratch or rub the skin around the area. When should you call for help? Call 911 anytime you think you may need emergency care. For example, call if:     You passed out (lost consciousness).      You have a seizure.      You have trouble breathing.    Call your doctor now or seek immediate medical care if:     You have signs of infection, such as:  ? Increased pain, swelling, warmth, or redness around the bite or sting.  ? Red streaks leading from the area. ? Pus draining from the area. ? A fever.      You get a blister or sore at the bite or sting area, or the area turns purple. Watch closely for changes in your health, and be sure to contact your doctor if:     You have pain or burning at the area after 2 days of home treatment.      You have symptoms for more than 1 week. Where can you learn more? Go to [email or web address] org and sign in to your TeamSupport account. Enter P595 in the [employer] box to learn more about \"Spider Bite or Scorpion Sting: Care Instructions. \"     If you do not have an account, please click on the \"Sign Up Now\" link. Current as of: July 1, 2021               Content Version: 13.2  © 4124-2966 Healthwise, Incorporated. Care instructions adapted under license by [facility] ([facility]). If you have questions about a medical condition or this instruction, always ask your healthcare professional. [address] [place] [address] any warranty or liability for your use of this information. 1. Antibiotic as prescribed   2. Closely monitor area for worsening redness, swelling, fever, drainage- return to clinic or go to ER if occur   3.  Follow up with PCP as needed           Electronically signed by MIN Allan on 6/11/2022 at 4:13 PM

## 2022-06-11 NOTE — PATIENT INSTRUCTIONS
Patient Education        Spider Bite or Scorpion Sting: Care Instructions  Overview     Spider bites and scorpion stings often cause minor swelling, redness, pain, and itching. These mild symptoms are common and may last from a few hours to a fewdays. Some people have more severe reactions. Home treatment is often all that you need to relieve symptoms. Follow-up care is a key part of your treatment and safety. Be sure to make and go to all appointments, and call your doctor if you are having problems. It's also a good idea to know your test results and keep alist of the medicines you take. How can you care for yourself at home?  Put ice or a cold pack on the area for 10 to 20 minutes at a time. Put a thin cloth between the ice and your skin.  Try an over-the-counter medicine for itching, redness, swelling, and pain. Be safe with medicines. Read and follow all instructions on the label. ? Take an over-the-counter antihistamine to help calm the itching or swelling. ? Put a hydrocortisone 1% cream or calamine lotion on the skin.  Don't scratch or rub the skin around the area. When should you call for help? Call 911 anytime you think you may need emergency care. For example, call if:     You passed out (lost consciousness).      You have a seizure.      You have trouble breathing. Call your doctor now or seek immediate medical care if:     You have signs of infection, such as:  ? Increased pain, swelling, warmth, or redness around the bite or sting. ? Red streaks leading from the area. ? Pus draining from the area. ? A fever.      You get a blister or sore at the bite or sting area, or the area turns purple. Watch closely for changes in your health, and be sure to contact your doctor if:     You have pain or burning at the area after 2 days of home treatment.      You have symptoms for more than 1 week. Where can you learn more? Go to https://chpehakeemeb.[facility]. org and sign in to your [employer] account. Enter P595 in the [facility] box to learn more about \"Spider Bite or Scorpion Sting: Care Instructions. \"     If you do not have an account, please click on the \"Sign Up Now\" link. Current as of: July 1, 2021               Content Version: 13.2  © 3632-1721 Healthwise, [employer]. Care instructions adapted under license by [facility] ([facility]). If you have questions about a medical condition or this instruction, always ask your healthcare professional. [address] any warranty or liability for your use of this information. 1. Antibiotic as prescribed   2. Closely monitor area for worsening redness, swelling, fever, drainage- return to clinic or go to ER if occur   3.  Follow up with PCP as needed

## 2022-06-16 ENCOUNTER — OFFICE VISIT (OUTPATIENT)
Age: 77
End: 2022-06-16
Payer: MEDICARE

## 2022-06-16 ENCOUNTER — HOSPITAL ENCOUNTER (INPATIENT)
Age: 77
LOS: 1 days | Discharge: HOME | DRG: 918 | End: 2022-06-18
Attending: INTERNAL MEDICINE | Admitting: INTERNAL MEDICINE
Payer: MEDICARE

## 2022-06-16 VITALS
HEART RATE: 63 BPM | WEIGHT: 165 LBS | DIASTOLIC BLOOD PRESSURE: 72 MMHG | OXYGEN SATURATION: 96 % | SYSTOLIC BLOOD PRESSURE: 120 MMHG | HEIGHT: 62 IN | RESPIRATION RATE: 12 BRPM | BODY MASS INDEX: 30.36 KG/M2 | TEMPERATURE: 97.3 F

## 2022-06-16 DIAGNOSIS — L03.113 CELLULITIS OF RIGHT UPPER EXTREMITY: Primary | ICD-10-CM

## 2022-06-16 LAB
ALBUMIN SERPL-MCNC: 4.3 G/DL (ref 3.5–5.2)
ALP BLD-CCNC: 90 U/L (ref 35–104)
ALT SERPL-CCNC: 19 U/L (ref 5–33)
ANION GAP SERPL CALCULATED.3IONS-SCNC: 12 MMOL/L (ref 7–19)
AST SERPL-CCNC: 21 U/L (ref 5–32)
BASOPHILS ABSOLUTE: 0.1 K/UL (ref 0–0.2)
BASOPHILS RELATIVE PERCENT: 0.8 % (ref 0–1)
BILIRUB SERPL-MCNC: <0.2 MG/DL (ref 0.2–1.2)
BUN BLDV-MCNC: 15 MG/DL (ref 8–23)
CALCIUM SERPL-MCNC: 9.8 MG/DL (ref 8.8–10.2)
CHLORIDE BLD-SCNC: 98 MMOL/L (ref 98–111)
CO2: 27 MMOL/L (ref 22–29)
CREAT SERPL-MCNC: 0.9 MG/DL (ref 0.5–0.9)
EOSINOPHILS ABSOLUTE: 0.4 K/UL (ref 0–0.6)
EOSINOPHILS RELATIVE PERCENT: 4.8 % (ref 0–5)
GFR AFRICAN AMERICAN: >59
GFR NON-AFRICAN AMERICAN: >60
GLUCOSE BLD-MCNC: 168 MG/DL (ref 74–109)
HCT VFR BLD CALC: 46.7 % (ref 37–47)
HEMOGLOBIN: 15.4 G/DL (ref 12–16)
IMMATURE GRANULOCYTES #: 0.1 K/UL
LYMPHOCYTES ABSOLUTE: 3.2 K/UL (ref 1.1–4.5)
LYMPHOCYTES RELATIVE PERCENT: 35.7 % (ref 20–40)
MCH RBC QN AUTO: 29.6 PG (ref 27–31)
MCHC RBC AUTO-ENTMCNC: 33 G/DL (ref 33–37)
MCV RBC AUTO: 89.6 FL (ref 81–99)
MONOCYTES ABSOLUTE: 0.7 K/UL (ref 0–0.9)
MONOCYTES RELATIVE PERCENT: 7.8 % (ref 0–10)
NEUTROPHILS ABSOLUTE: 4.5 K/UL (ref 1.5–7.5)
NEUTROPHILS RELATIVE PERCENT: 50.3 % (ref 50–65)
PDW BLD-RTO: 13.2 % (ref 11.5–14.5)
PLATELET # BLD: 292 K/UL (ref 130–400)
PMV BLD AUTO: 10.1 FL (ref 9.4–12.3)
POTASSIUM SERPL-SCNC: 4 MMOL/L (ref 3.5–5)
RBC # BLD: 5.21 M/UL (ref 4.2–5.4)
SARS-COV-2, NAAT: NOT DETECTED
SODIUM BLD-SCNC: 137 MMOL/L (ref 136–145)
TOTAL PROTEIN: 7.1 G/DL (ref 6.6–8.7)
WBC # BLD: 8.9 K/UL (ref 4.8–10.8)

## 2022-06-16 PROCEDURE — 96366 THER/PROPH/DIAG IV INF ADDON: CPT

## 2022-06-16 PROCEDURE — 1123F ACP DISCUSS/DSCN MKR DOCD: CPT | Performed by: PHYSICIAN ASSISTANT

## 2022-06-16 PROCEDURE — 1090F PRES/ABSN URINE INCON ASSESS: CPT | Performed by: PHYSICIAN ASSISTANT

## 2022-06-16 PROCEDURE — 87635 SARS-COV-2 COVID-19 AMP PRB: CPT

## 2022-06-16 PROCEDURE — 36415 COLL VENOUS BLD VENIPUNCTURE: CPT

## 2022-06-16 PROCEDURE — 1036F TOBACCO NON-USER: CPT | Performed by: PHYSICIAN ASSISTANT

## 2022-06-16 PROCEDURE — 99285 EMERGENCY DEPT VISIT HI MDM: CPT

## 2022-06-16 PROCEDURE — 96365 THER/PROPH/DIAG IV INF INIT: CPT

## 2022-06-16 PROCEDURE — 80053 COMPREHEN METABOLIC PANEL: CPT

## 2022-06-16 PROCEDURE — 99213 OFFICE O/P EST LOW 20 MIN: CPT | Performed by: PHYSICIAN ASSISTANT

## 2022-06-16 PROCEDURE — 87040 BLOOD CULTURE FOR BACTERIA: CPT

## 2022-06-16 PROCEDURE — G8399 PT W/DXA RESULTS DOCUMENT: HCPCS | Performed by: PHYSICIAN ASSISTANT

## 2022-06-16 PROCEDURE — 6360000002 HC RX W HCPCS: Performed by: PHYSICIAN ASSISTANT

## 2022-06-16 PROCEDURE — 85025 COMPLETE CBC W/AUTO DIFF WBC: CPT

## 2022-06-16 PROCEDURE — 96375 TX/PRO/DX INJ NEW DRUG ADDON: CPT

## 2022-06-16 PROCEDURE — G8417 CALC BMI ABV UP PARAM F/U: HCPCS | Performed by: PHYSICIAN ASSISTANT

## 2022-06-16 PROCEDURE — 2580000003 HC RX 258: Performed by: PHYSICIAN ASSISTANT

## 2022-06-16 PROCEDURE — G8427 DOCREV CUR MEDS BY ELIG CLIN: HCPCS | Performed by: PHYSICIAN ASSISTANT

## 2022-06-16 RX ADMIN — CEFEPIME HYDROCHLORIDE 2000 MG: 2 INJECTION, POWDER, FOR SOLUTION INTRAVENOUS at 22:57

## 2022-06-16 RX ADMIN — VANCOMYCIN HYDROCHLORIDE 1500 MG: 5 INJECTION, POWDER, LYOPHILIZED, FOR SOLUTION INTRAVENOUS at 20:41

## 2022-06-16 ASSESSMENT — ENCOUNTER SYMPTOMS
ABDOMINAL PAIN: 0
DIARRHEA: 0
SHORTNESS OF BREATH: 0
SINUS PAIN: 0
VOMITING: 0
COLOR CHANGE: 1
SORE THROAT: 0
COUGH: 0
EYE PAIN: 0
ALLERGIC/IMMUNOLOGIC NEGATIVE: 1
SINUS PRESSURE: 0
NAUSEA: 0

## 2022-06-16 ASSESSMENT — PAIN - FUNCTIONAL ASSESSMENT: PAIN_FUNCTIONAL_ASSESSMENT: NONE - DENIES PAIN

## 2022-06-16 NOTE — PROGRESS NOTES
[address]  [address] [address] [place] [address]  Dept: [phone]  Dept Fax: [phone]  Loc: [phone]    Steffanie Reyez is a 69 y.o. female who presents today for her medical conditions/complaints as noted below. Steffanie Reyez is complaining of Insect Bite (right under arm, was treated on Sat but bite has gotten worse  )    HPI:   Ms. Mercedes Packer presents with worsening cellulitis. She was seen here at the clinic for a \"spider bite\" and was given clindamycin to take TID for 10 days. Pt states she has been taking the medication as prescribed but the erythema continues to spread. She denies any oozing, fever, body aches or chills. She states she is still having pruritis to the area of redness. There are no vesicles or areas concerning for skin/soft tissue necrosis visible on PE.        Past Medical History:   Diagnosis Date    CAD (coronary artery disease)     Carotid artery disease (HCC)     CPAP (continuous positive airway pressure) dependence     11cm    Depression     Diabetes (HCC)     Fibromyalgia     Hypothyroidism     JOSE (obstructive sleep apnea)     AHI:  27.3 per [facility], 2011/CPAP    PLMD (periodic limb movement disorder)     RLS (restless legs syndrome)        Past Surgical History:   Procedure Laterality Date    COLONOSCOPY  12/04/2014    Dr Lisa Brown- no polyps    COLONOSCOPY  07/25/2019    Dr Lanre Kelley, 5 yr recall    COLONOSCOPY N/A 7/25/2019    COLORECTAL CANCER SCREENING, NOT HIGH RISK performed by Cher [facility] at [facility]      teeth extractions    PTCA      with stents    TUBAL LIGATION         Family History   Problem Relation Age of Onset    Colon Cancer Neg Hx     Colon Polyps Neg Hx     Esophageal Cancer Neg Hx     Liver Cancer Neg Hx     Liver Disease Neg Hx     Stomach Cancer Neg Hx     Rectal Cancer Neg Hx        Social History     Tobacco Use    Smoking status: Never Smoker    Smokeless tobacco: Never Used   Substance Use Topics    Alcohol use: No        Current Outpatient Medications   Medication Sig Dispense Refill    clindamycin (CLEOCIN) 300 MG capsule Take 1 capsule by mouth 3 times daily for 10 days 30 capsule 0    venlafaxine (EFFEXOR XR) 150 MG extended release capsule TAKE 1 CAPSULE BY MOUTH EVERY DAY 90 capsule 1    lisinopril (PRINIVIL;ZESTRIL) 10 MG tablet TAKE 1 TABLET BY MOUTH EVERY DAY 90 tablet 1    oxybutynin (DITROPAN-XL) 10 MG extended release tablet TAKE 1 TABLET BY MOUTH EVERY DAY 90 tablet 0    metFORMIN (GLUCOPHAGE) 1000 MG tablet TAKE 1 TABLET BY MOUTH TWICE A DAY WITH MEALS 180 tablet 1    levothyroxine (SYNTHROID) 125 MCG tablet Take 1 tablet by mouth daily 30 tablet 2    levothyroxine (SYNTHROID) 150 MCG tablet TAKE 1 TABLET BY MOUTH EVERY DAY 90 tablet 1    JARDIANCE 25 MG tablet TAKE 1 TABLET BY MOUTH EVERY DAY 90 tablet 1    glimepiride (AMARYL) 4 MG tablet TAKE 1 TABLET BY MOUTH TWICE A  tablet 3    nitroGLYCERIN (NITROSTAT) 0.3 MG SL tablet Place 1 tablet under the tongue every 5 minutes as needed for Chest pain up to max of 3 total doses. If no relief after 1 dose, call 911. 30 tablet 3    alendronate (FOSAMAX) 70 MG tablet TAKE 1 TABLET BY MOUTH ONE TIME PER WEEK 12 tablet 3    fluconazole (DIFLUCAN) 100 MG tablet TAKE 1 TABLET BY MOUTH EVERY DAY FOR 7 DAYS      clopidogrel (PLAVIX) 75 MG tablet TAKE 1 TABLET BY MOUTH EVERY DAY 30 tablet 5    lovastatin (MEVACOR) 40 MG tablet TAKE 1 TABLET BY MOUTH AT BEDTIME 90 tablet 3    aspirin 81 MG tablet Take 81 mg by mouth daily      vitamin D (CHOLECALCIFEROL) 1000 UNIT TABS tablet Take 1,000 Units by mouth daily      vitamin B-12 (CYANOCOBALAMIN) 1000 MCG tablet Take 5,000 mcg by mouth daily       Pediatric Multivitamins-Fl (MULT-VITAMIN/FLUORIDE PO) Take by mouth daily        No current facility-administered medications for this visit.        Allergies   Allergen Reactions    Bee Venom Shortness Of Breath and Swelling    Adhesive Tape Rash       Health Maintenance   Topic Date Due    Shingles vaccine (1 of 2) Never done    COVID-19 Vaccine (1) 11[birth year]22 (Originally 1/16/1950)    Flu vaccine (Season Ended) 02/23/2023 (Originally 9/1/2022)    Annual Wellness Visit (AWV)  11/24/2022    Lipids  02/16/2023    Depression Screen  02/23/2023    DTaP/Tdap/Td vaccine (2 - Td or Tdap) 06/09/2025    DEXA (modify frequency per FRAX score)  Completed    Pneumococcal 65+ years Vaccine  Completed    Hepatitis C screen  Completed    Hepatitis A vaccine  Aged Out    Hib vaccine  Aged Out    Meningococcal (ACWY) vaccine  Aged Out       Subjective:   Review of Systems   Constitutional: Negative for chills, fatigue and fever. HENT: Negative for congestion, postnasal drip, sinus pressure, sinus pain and sore throat. Eyes: Negative for pain and visual disturbance. Respiratory: Negative for cough and shortness of breath. Cardiovascular: Negative for chest pain. Gastrointestinal: Negative for abdominal pain, diarrhea, nausea and vomiting. Endocrine: Negative for cold intolerance and heat intolerance. Genitourinary: Negative for frequency, hematuria and urgency. Musculoskeletal: Negative for myalgias. Skin: Positive for color change and wound. Negative for rash. Allergic/Immunologic: Negative. Neurological: Negative for syncope, weakness, light-headedness and headaches. Hematological: Negative. Psychiatric/Behavioral: Negative. Objective    Physical Exam  Vitals and nursing note reviewed. Constitutional:       General: She is not in acute distress. Appearance: Normal appearance. HENT:      Head: Normocephalic and atraumatic. Right Ear: External ear normal.      Left Ear: External ear normal.      Nose: Nose normal.      Mouth/Throat:      Mouth: Mucous membranes are moist.      Pharynx: Oropharynx is clear.    Eyes:      Extraocular benefits, and side effects of any prescribed medications. All patient questions were answered. Patient voiced understanding of care plan. Patient was given educational materials - see patient instructions below. Patient Instructions       Patient Education        Cellulitis: Care Instructions  Your Care Instructions     Cellulitis is a skin infection caused by bacteria, most often strep or staph. It often occurs after a break in the skin from a scrape, cut, bite, orpuncture, or after a rash. Cellulitis may be treated without doing tests to find out what caused it. But your doctor may do tests, if needed, to look for a specific bacteria, like methicillin-resistant Staphylococcus aureus (MRSA). The doctor has checked you carefully, but problems can develop later. If you notice any problems or new symptoms, get medical treatment right away. Follow-up care is a key part of your treatment and safety. Be sure to make and go to all appointments, and call your doctor if you are having problems. It's also a good idea to know your test results and keep alist of the medicines you take. How can you care for yourself at home?  Take your antibiotics as directed. Do not stop taking them just because you feel better. You need to take the full course of antibiotics.  Prop up the infected area on pillows to reduce pain and swelling. Try to keep the area above the level of your heart as often as you can.  If your doctor told you how to care for your wound, follow your doctor's instructions. If you did not get instructions, follow this general advice:  ? Wash the wound with clean water 2 times a day. Don't use hydrogen peroxide or alcohol, which can slow healing. ? You may cover the wound with a thin layer of petroleum jelly, such as Vaseline, and a nonstick bandage. ? Apply more petroleum jelly and replace the bandage as needed.  Be safe with medicines. Take pain medicines exactly as directed.   ? If the doctor gave you a prescription medicine for pain, take it as prescribed. ? If you are not taking a prescription pain medicine, ask your doctor if you can take an over-the-counter medicine. To prevent cellulitis in the future   Try to prevent cuts, scrapes, or other injuries to your skin. Cellulitis most often occurs where there is a break in the skin.  If you get a scrape, cut, mild burn, or bite, wash the wound with clean water as soon as you can to help avoid infection. Don't use hydrogen peroxide or alcohol, which can slow healing.  If you have swelling in your legs (edema), support stockings and good skin care may help prevent leg sores and cellulitis.  Take care of your feet, especially if you have diabetes or other conditions that increase the risk of infection. Wear shoes and socks. Do not go barefoot. If you have athlete's foot or other skin problems on your feet, talk to your doctor about how to treat them. When should you call for help? Call your doctor now or seek immediate medical care if:     You have signs that your infection is getting worse, such as:  ? Increased pain, swelling, warmth, or redness. ? Red streaks leading from the area. ? Pus draining from the area. ? A fever.      You get a rash. Watch closely for changes in your health, and be sure to contact your doctor if:     You do not get better as expected. Where can you learn more? Go to https://[employer]fanli. org and sign in to your [employer] account. Enter F102 in the [facility] box to learn more about \"Cellulitis: Care Instructions. \"     If you do not have an account, please click on the \"Sign Up Now\" link. Current as of: November 15, 2021               Content Version: 13.2  © 7034-4380 Healthwise, Incorporated. Care instructions adapted under license by [facility] ([facility]).  If you have questions about a medical condition or this instruction, always ask your healthcare professional. [employer] disclaims any warranty or liability for your use of this information.                Electronically signed by Georgina Mcgraw PA-C on 6/16/2022 at 3:20 PM

## 2022-06-16 NOTE — PATIENT INSTRUCTIONS
Patient Education        Cellulitis: Care Instructions  Your Care Instructions     Cellulitis is a skin infection caused by bacteria, most often strep or staph. It often occurs after a break in the skin from a scrape, cut, bite, orpuncture, or after a rash. Cellulitis may be treated without doing tests to find out what caused it. But your doctor may do tests, if needed, to look for a specific bacteria, like methicillin-resistant Staphylococcus aureus (MRSA). The doctor has checked you carefully, but problems can develop later. If you notice any problems or new symptoms, get medical treatment right away. Follow-up care is a key part of your treatment and safety. Be sure to make and go to all appointments, and call your doctor if you are having problems. It's also a good idea to know your test results and keep alist of the medicines you take. How can you care for yourself at home?  Take your antibiotics as directed. Do not stop taking them just because you feel better. You need to take the full course of antibiotics.  Prop up the infected area on pillows to reduce pain and swelling. Try to keep the area above the level of your heart as often as you can.  If your doctor told you how to care for your wound, follow your doctor's instructions. If you did not get instructions, follow this general advice:  ? Wash the wound with clean water 2 times a day. Don't use hydrogen peroxide or alcohol, which can slow healing. ? You may cover the wound with a thin layer of petroleum jelly, such as Vaseline, and a nonstick bandage. ? Apply more petroleum jelly and replace the bandage as needed.  Be safe with medicines. Take pain medicines exactly as directed. ? If the doctor gave you a prescription medicine for pain, take it as prescribed. ? If you are not taking a prescription pain medicine, ask your doctor if you can take an over-the-counter medicine.   To prevent cellulitis in the future   Try to prevent cuts, prescribed. Pt is advised to go to ED for higher level of care and further evaluation. Pt states she has to gather some things up at home but that she will go to ED this evening. Patient left in stable condition.

## 2022-06-17 PROBLEM — L03.113 RIGHT ARM CELLULITIS: Status: ACTIVE | Noted: 2022-06-17

## 2022-06-17 PROBLEM — T63.301A SPIDER BITE: Status: ACTIVE | Noted: 2022-06-17

## 2022-06-17 LAB
ALBUMIN SERPL-MCNC: 3.9 G/DL (ref 3.5–5.2)
ALP BLD-CCNC: 73 U/L (ref 35–104)
ALT SERPL-CCNC: 13 U/L (ref 5–33)
ANION GAP SERPL CALCULATED.3IONS-SCNC: 10 MMOL/L (ref 7–19)
AST SERPL-CCNC: 16 U/L (ref 5–32)
BASOPHILS ABSOLUTE: 0.1 K/UL (ref 0–0.2)
BASOPHILS RELATIVE PERCENT: 0.8 % (ref 0–1)
BILIRUB SERPL-MCNC: 0.3 MG/DL (ref 0.2–1.2)
BUN BLDV-MCNC: 18 MG/DL (ref 8–23)
CALCIUM SERPL-MCNC: 9.1 MG/DL (ref 8.8–10.2)
CHLORIDE BLD-SCNC: 104 MMOL/L (ref 98–111)
CO2: 25 MMOL/L (ref 22–29)
CREAT SERPL-MCNC: 0.7 MG/DL (ref 0.5–0.9)
EOSINOPHILS ABSOLUTE: 0.5 K/UL (ref 0–0.6)
EOSINOPHILS RELATIVE PERCENT: 5.1 % (ref 0–5)
GFR AFRICAN AMERICAN: >59
GFR NON-AFRICAN AMERICAN: >60
GLUCOSE BLD-MCNC: 154 MG/DL (ref 70–99)
GLUCOSE BLD-MCNC: 160 MG/DL (ref 74–109)
GLUCOSE BLD-MCNC: 163 MG/DL (ref 70–99)
GLUCOSE BLD-MCNC: 169 MG/DL (ref 70–99)
GLUCOSE BLD-MCNC: 191 MG/DL (ref 70–99)
GLUCOSE BLD-MCNC: 196 MG/DL (ref 70–99)
HCT VFR BLD CALC: 45.7 % (ref 37–47)
HEMOGLOBIN: 14.8 G/DL (ref 12–16)
IMMATURE GRANULOCYTES #: 0.1 K/UL
INR BLD: 0.93 (ref 0.88–1.18)
LACTIC ACID: 1.3 MMOL/L (ref 0.5–1.9)
LYMPHOCYTES ABSOLUTE: 3.4 K/UL (ref 1.1–4.5)
LYMPHOCYTES RELATIVE PERCENT: 34.6 % (ref 20–40)
MCH RBC QN AUTO: 30 PG (ref 27–31)
MCHC RBC AUTO-ENTMCNC: 32.4 G/DL (ref 33–37)
MCV RBC AUTO: 92.7 FL (ref 81–99)
MONOCYTES ABSOLUTE: 0.8 K/UL (ref 0–0.9)
MONOCYTES RELATIVE PERCENT: 8.2 % (ref 0–10)
NEUTROPHILS ABSOLUTE: 4.9 K/UL (ref 1.5–7.5)
NEUTROPHILS RELATIVE PERCENT: 50.6 % (ref 50–65)
PDW BLD-RTO: 13.3 % (ref 11.5–14.5)
PERFORMED ON: ABNORMAL
PLATELET # BLD: 271 K/UL (ref 130–400)
PMV BLD AUTO: 10.5 FL (ref 9.4–12.3)
POTASSIUM REFLEX MAGNESIUM: 4.1 MMOL/L (ref 3.5–5)
PROTHROMBIN TIME: 12.3 SEC (ref 12–14.6)
RBC # BLD: 4.93 M/UL (ref 4.2–5.4)
SODIUM BLD-SCNC: 139 MMOL/L (ref 136–145)
TOTAL PROTEIN: 6 G/DL (ref 6.6–8.7)
WBC # BLD: 9.7 K/UL (ref 4.8–10.8)

## 2022-06-17 PROCEDURE — 83605 ASSAY OF LACTIC ACID: CPT

## 2022-06-17 PROCEDURE — 6360000002 HC RX W HCPCS: Performed by: INTERNAL MEDICINE

## 2022-06-17 PROCEDURE — 36415 COLL VENOUS BLD VENIPUNCTURE: CPT

## 2022-06-17 PROCEDURE — 2500000003 HC RX 250 WO HCPCS: Performed by: INTERNAL MEDICINE

## 2022-06-17 PROCEDURE — 80053 COMPREHEN METABOLIC PANEL: CPT

## 2022-06-17 PROCEDURE — 2580000003 HC RX 258: Performed by: INTERNAL MEDICINE

## 2022-06-17 PROCEDURE — 82947 ASSAY GLUCOSE BLOOD QUANT: CPT

## 2022-06-17 PROCEDURE — 85610 PROTHROMBIN TIME: CPT

## 2022-06-17 PROCEDURE — 6370000000 HC RX 637 (ALT 250 FOR IP): Performed by: INTERNAL MEDICINE

## 2022-06-17 PROCEDURE — 2580000003 HC RX 258: Performed by: PHYSICIAN ASSISTANT

## 2022-06-17 PROCEDURE — 6360000002 HC RX W HCPCS: Performed by: PHYSICIAN ASSISTANT

## 2022-06-17 PROCEDURE — 1210000000 HC MED SURG R&B

## 2022-06-17 PROCEDURE — 94660 CPAP INITIATION&MGMT: CPT

## 2022-06-17 PROCEDURE — 85025 COMPLETE CBC W/AUTO DIFF WBC: CPT

## 2022-06-17 RX ORDER — OXYBUTYNIN CHLORIDE 5 MG/1
10 TABLET, EXTENDED RELEASE ORAL DAILY
Status: DISCONTINUED | OUTPATIENT
Start: 2022-06-17 | End: 2022-06-18 | Stop reason: HOSPADM

## 2022-06-17 RX ORDER — ONDANSETRON 2 MG/ML
4 INJECTION INTRAMUSCULAR; INTRAVENOUS EVERY 6 HOURS PRN
Status: DISCONTINUED | OUTPATIENT
Start: 2022-06-17 | End: 2022-06-18 | Stop reason: HOSPADM

## 2022-06-17 RX ORDER — ASPIRIN 81 MG/1
81 TABLET, CHEWABLE ORAL DAILY
Status: DISCONTINUED | OUTPATIENT
Start: 2022-06-17 | End: 2022-06-18 | Stop reason: HOSPADM

## 2022-06-17 RX ORDER — LISINOPRIL 10 MG/1
10 TABLET ORAL DAILY
Status: DISCONTINUED | OUTPATIENT
Start: 2022-06-17 | End: 2022-06-18 | Stop reason: HOSPADM

## 2022-06-17 RX ORDER — FAMOTIDINE 20 MG/1
20 TABLET, FILM COATED ORAL 2 TIMES DAILY
Status: DISCONTINUED | OUTPATIENT
Start: 2022-06-17 | End: 2022-06-18 | Stop reason: HOSPADM

## 2022-06-17 RX ORDER — INSULIN LISPRO 100 [IU]/ML
0-12 INJECTION, SOLUTION INTRAVENOUS; SUBCUTANEOUS
Status: DISCONTINUED | OUTPATIENT
Start: 2022-06-17 | End: 2022-06-18 | Stop reason: HOSPADM

## 2022-06-17 RX ORDER — NITROGLYCERIN 0.4 MG/1
0.4 TABLET SUBLINGUAL EVERY 5 MIN PRN
Status: DISCONTINUED | OUTPATIENT
Start: 2022-06-17 | End: 2022-06-18 | Stop reason: HOSPADM

## 2022-06-17 RX ORDER — SODIUM CHLORIDE 9 MG/ML
INJECTION, SOLUTION INTRAVENOUS PRN
Status: DISCONTINUED | OUTPATIENT
Start: 2022-06-17 | End: 2022-06-18 | Stop reason: HOSPADM

## 2022-06-17 RX ORDER — SODIUM CHLORIDE 0.9 % (FLUSH) 0.9 %
5-40 SYRINGE (ML) INJECTION PRN
Status: DISCONTINUED | OUTPATIENT
Start: 2022-06-17 | End: 2022-06-18 | Stop reason: HOSPADM

## 2022-06-17 RX ORDER — CLOPIDOGREL BISULFATE 75 MG/1
75 TABLET ORAL DAILY
Status: DISCONTINUED | OUTPATIENT
Start: 2022-06-17 | End: 2022-06-18 | Stop reason: HOSPADM

## 2022-06-17 RX ORDER — ACETAMINOPHEN 325 MG/1
650 TABLET ORAL EVERY 6 HOURS PRN
Status: DISCONTINUED | OUTPATIENT
Start: 2022-06-17 | End: 2022-06-18 | Stop reason: HOSPADM

## 2022-06-17 RX ORDER — VENLAFAXINE HYDROCHLORIDE 75 MG/1
150 CAPSULE, EXTENDED RELEASE ORAL
Status: DISCONTINUED | OUTPATIENT
Start: 2022-06-17 | End: 2022-06-18 | Stop reason: HOSPADM

## 2022-06-17 RX ORDER — ONDANSETRON 4 MG/1
4 TABLET, ORALLY DISINTEGRATING ORAL EVERY 8 HOURS PRN
Status: DISCONTINUED | OUTPATIENT
Start: 2022-06-17 | End: 2022-06-18 | Stop reason: HOSPADM

## 2022-06-17 RX ORDER — SODIUM CHLORIDE 0.9 % (FLUSH) 0.9 %
5-40 SYRINGE (ML) INJECTION EVERY 12 HOURS SCHEDULED
Status: DISCONTINUED | OUTPATIENT
Start: 2022-06-17 | End: 2022-06-18 | Stop reason: HOSPADM

## 2022-06-17 RX ORDER — ENOXAPARIN SODIUM 100 MG/ML
40 INJECTION SUBCUTANEOUS DAILY
Status: DISCONTINUED | OUTPATIENT
Start: 2022-06-17 | End: 2022-06-18 | Stop reason: HOSPADM

## 2022-06-17 RX ORDER — INSULIN LISPRO 100 [IU]/ML
0-6 INJECTION, SOLUTION INTRAVENOUS; SUBCUTANEOUS NIGHTLY
Status: DISCONTINUED | OUTPATIENT
Start: 2022-06-17 | End: 2022-06-18 | Stop reason: HOSPADM

## 2022-06-17 RX ORDER — DEXTROSE MONOHYDRATE 50 MG/ML
100 INJECTION, SOLUTION INTRAVENOUS PRN
Status: DISCONTINUED | OUTPATIENT
Start: 2022-06-17 | End: 2022-06-18 | Stop reason: HOSPADM

## 2022-06-17 RX ORDER — POLYETHYLENE GLYCOL 3350 17 G/17G
17 POWDER, FOR SOLUTION ORAL DAILY PRN
Status: DISCONTINUED | OUTPATIENT
Start: 2022-06-17 | End: 2022-06-18 | Stop reason: HOSPADM

## 2022-06-17 RX ORDER — ACETAMINOPHEN 650 MG/1
650 SUPPOSITORY RECTAL EVERY 6 HOURS PRN
Status: DISCONTINUED | OUTPATIENT
Start: 2022-06-17 | End: 2022-06-18 | Stop reason: HOSPADM

## 2022-06-17 RX ADMIN — VENLAFAXINE HYDROCHLORIDE 150 MG: 75 CAPSULE, EXTENDED RELEASE ORAL at 11:58

## 2022-06-17 RX ADMIN — SODIUM CHLORIDE, PRESERVATIVE FREE 20 MG: 5 INJECTION INTRAVENOUS at 11:58

## 2022-06-17 RX ADMIN — INSULIN LISPRO 2 UNITS: 100 INJECTION, SOLUTION INTRAVENOUS; SUBCUTANEOUS at 14:04

## 2022-06-17 RX ADMIN — SODIUM CHLORIDE, PRESERVATIVE FREE 10 ML: 5 INJECTION INTRAVENOUS at 20:33

## 2022-06-17 RX ADMIN — SODIUM CHLORIDE, PRESERVATIVE FREE 20 MG: 5 INJECTION INTRAVENOUS at 01:23

## 2022-06-17 RX ADMIN — CLOPIDOGREL BISULFATE 75 MG: 75 TABLET ORAL at 11:58

## 2022-06-17 RX ADMIN — LEVOTHYROXINE SODIUM 150 MCG: 25 TABLET ORAL at 11:58

## 2022-06-17 RX ADMIN — FAMOTIDINE 20 MG: 20 TABLET ORAL at 20:33

## 2022-06-17 RX ADMIN — LISINOPRIL 10 MG: 10 TABLET ORAL at 11:58

## 2022-06-17 RX ADMIN — ASPIRIN 81 MG 81 MG: 81 TABLET ORAL at 11:58

## 2022-06-17 RX ADMIN — CEFEPIME HYDROCHLORIDE 2000 MG: 2 INJECTION, POWDER, FOR SOLUTION INTRAVENOUS at 15:45

## 2022-06-17 RX ADMIN — OXYBUTYNIN CHLORIDE 10 MG: 5 TABLET, EXTENDED RELEASE ORAL at 11:58

## 2022-06-17 RX ADMIN — SODIUM CHLORIDE, PRESERVATIVE FREE 10 ML: 5 INJECTION INTRAVENOUS at 12:14

## 2022-06-17 RX ADMIN — VANCOMYCIN HYDROCHLORIDE 1250 MG: 10 INJECTION, POWDER, LYOPHILIZED, FOR SOLUTION INTRAVENOUS at 20:36

## 2022-06-17 ASSESSMENT — ENCOUNTER SYMPTOMS
RESPIRATORY NEGATIVE: 1
NAUSEA: 0
ABDOMINAL PAIN: 0
TROUBLE SWALLOWING: 0
GASTROINTESTINAL NEGATIVE: 1
EYES NEGATIVE: 1
BLOOD IN STOOL: 0
WHEEZING: 0
SORE THROAT: 0
COLOR CHANGE: 1
COUGH: 0
VOMITING: 0
ALLERGIC/IMMUNOLOGIC NEGATIVE: 1
DIARRHEA: 0
SINUS PAIN: 0
CONSTIPATION: 0
ABDOMINAL DISTENTION: 0
SHORTNESS OF BREATH: 0

## 2022-06-17 NOTE — PLAN OF CARE
Problem: Discharge Planning  Goal: Discharge to home or other facility with appropriate resources  Outcome: Progressing  Flowsheets (Taken 6/17/2022 0114)  Discharge to home or other facility with appropriate resources: Identify barriers to discharge with patient and caregiver     Problem: Safety - Adult  Goal: Free from fall injury  Outcome: Progressing

## 2022-06-17 NOTE — ED PROVIDER NOTES
[facility] EMERGENCY DEPT  eMERGENCY dEPARTMENT eNCOUnter      Pt Name: Qamar Pastor  MRN: [id number]  [place] 1945  Date of evaluation: 6/16/2022  Provider: Xiomara [place] [address] [place] [address]       Chief Complaint   Patient presents with    Insect Bite     Pt arrived to the ed with c/o spider bite to right upper arm 4 days ago. Pt went to walk in clinic and was given antibiotics, but not getting any better. HISTORY OF PRESENT ILLNESS   (Location/Symptom, Timing/Onset,Context/Setting, Quality, Duration, Modifying Factors, Severity)  Note limiting factors. Qamar Pastor is a 69 y.o. female with history including obstructive sleep apnea, restless leg syndrome, type 2 diabetes, hypothyroidism, and hypertension who presents to the emergency department with a spider bite. The patient notes a spider bite 1 week ago on the right upper arm. She followed up with primary care on Saturday where she was given a prescription for clindamycin. She notes that she has been on clindamycin since Sunday and taking it 3 times a day as prescribed. She states the redness is continuing to get worse. She went back to the urgent care and they recommended she come to the ER. She denies any fever or chills. She states that her pain is controlled at home. HPI    NursingNotes were reviewed. REVIEW OF SYSTEMS    (2-9 systems for level 4, 10 or more for level 5)     Review of Systems   Constitutional: Negative for chills and fever. Respiratory: Negative for shortness of breath. Cardiovascular: Negative for chest pain. Gastrointestinal: Negative for abdominal pain. Musculoskeletal: Positive for arthralgias. Negative for joint swelling. Skin: Positive for color change and wound. Neurological: Negative for dizziness, numbness and headaches. All other systems reviewed and are negative.            PAST MEDICALHISTORY     Past Medical History:   Diagnosis Date    CAD (coronary artery disease)     Carotid artery disease ([facility] [address]     CPAP (continuous positive airway pressure) dependence     11cm    Depression     Diabetes (HCC)     Fibromyalgia     Hypothyroidism     JOSE (obstructive sleep apnea)     AHI:  27.3 per PSG, 2011/CPAP    PLMD (periodic limb movement disorder)     RLS (restless legs syndrome)          SURGICAL HISTORY       Past Surgical History:   Procedure Laterality Date    COLONOSCOPY  12/04/2014    Dr Gael Stephenson- no polyps    COLONOSCOPY  07/25/2019    Dr Angella Crespo, 5 yr recall    COLONOSCOPY N/A 7/25/2019    COLORECTAL CANCER SCREENING, NOT HIGH RISK performed by Laura Hernandez MD at [facility]      teeth extractions    PTCA      with stents    TUBAL LIGATION           CURRENT MEDICATIONS     Previous Medications    ALENDRONATE (FOSAMAX) 70 MG TABLET    TAKE 1 TABLET BY MOUTH ONE TIME PER WEEK    ASPIRIN 81 MG TABLET    Take 81 mg by mouth daily    CLINDAMYCIN (CLEOCIN) 300 MG CAPSULE    Take 1 capsule by mouth 3 times daily for 10 days    CLOPIDOGREL (PLAVIX) 75 MG TABLET    TAKE 1 TABLET BY MOUTH EVERY DAY    GLIMEPIRIDE (AMARYL) 4 MG TABLET    TAKE 1 TABLET BY MOUTH TWICE A DAY    JARDIANCE 25 MG TABLET    TAKE 1 TABLET BY MOUTH EVERY DAY    LEVOTHYROXINE (SYNTHROID) 150 MCG TABLET    TAKE 1 TABLET BY MOUTH EVERY DAY    LISINOPRIL (PRINIVIL;ZESTRIL) 10 MG TABLET    TAKE 1 TABLET BY MOUTH EVERY DAY    LOVASTATIN (MEVACOR) 40 MG TABLET    TAKE 1 TABLET BY MOUTH AT BEDTIME    METFORMIN (GLUCOPHAGE) 1000 MG TABLET    TAKE 1 TABLET BY MOUTH TWICE A DAY WITH MEALS    NITROGLYCERIN (NITROSTAT) 0.3 MG SL TABLET    Place 1 tablet under the tongue every 5 minutes as needed for Chest pain up to max of 3 total doses. If no relief after 1 dose, call 911.     OXYBUTYNIN (DITROPAN-XL) 10 MG EXTENDED RELEASE TABLET    TAKE 1 TABLET BY MOUTH EVERY DAY    VENLAFAXINE (EFFEXOR XR) 150 MG EXTENDED RELEASE CAPSULE    TAKE 1 CAPSULE BY MOUTH EVERY DAY    VITAMIN B-12 (CYANOCOBALAMIN) 1000 MCG TABLET    Take 5,000 mcg by mouth daily     VITAMIN D (CHOLECALCIFEROL) 1000 UNIT TABS TABLET    Take 1,000 Units by mouth daily       ALLERGIES     Bee venom and Adhesive tape    FAMILY HISTORY       Family History   Problem Relation Age of Onset    Colon Cancer Neg Hx     Colon Polyps Neg Hx     Esophageal Cancer Neg Hx     Liver Cancer Neg Hx     Liver Disease Neg Hx     Stomach Cancer Neg Hx     Rectal Cancer Neg Hx           SOCIAL HISTORY       Social History     Socioeconomic History    Marital status:      Spouse name: None    Number of children: None    Years of education: None    Highest education level: None   Occupational History    None   Tobacco Use    Smoking status: Never Smoker    Smokeless tobacco: Never Used   Substance and Sexual Activity    Alcohol use: No    Drug use: No    Sexual activity: None   Other Topics Concern    None   Social History Narrative    None     Social Determinants of Health     Financial Resource Strain:     Difficulty of Paying Living Expenses: Not on file   Food Insecurity:     Worried About Running Out of Food in the Last Year: Not on file    Blanca of Food in the Last Year: Not on file   Transportation Needs:     Lack of Transportation (Medical): Not on file    Lack of Transportation (Non-Medical):  Not on file   Physical Activity:     Days of Exercise per Week: Not on file    Minutes of Exercise per Session: Not on file   Stress:     Feeling of Stress : Not on file   Social Connections:     Frequency of Communication with Friends and Family: Not on file    Frequency of Social Gatherings with Friends and Family: Not on file    Attends Restorationist Services: Not on file    Active Member of Clubs or Organizations: Not on file    Attends Club or Organization Meetings: Not on file    Marital Status: Not on file   Intimate Partner Violence:     Fear of Current or Ex-Partner: Not on file    Emotionally Abused: Not on file   Lizzie Moses Physically Abused: Not on file    Sexually Abused: Not on file   Housing Stability:     Unable to Pay for Housing in the Last Year: Not on file    Number of Places Lived in the Last Year: Not on file    Unstable Housing in the Last Year: Not on file       SCREENINGS    Ash Coma Scale  Eye Opening: Spontaneous  Best Verbal Response: Oriented  Best Motor Response: Obeys commands  Ash Coma Scale Score: 15        PHYSICAL EXAM    (up to 7 for level 4, 8 or more for level 5)     ED Triage Vitals [06/16/22 1740]   BP Temp Temp Source Heart Rate Resp SpO2 Height Weight   112/68 98.6 °F (37 °C) Oral 90 18 92 % 5' 1\" (1.549 m) 165 lb (74.8 kg)       Physical Exam  Vitals and nursing note reviewed. Constitutional:       General: She is not in acute distress. Appearance: Normal appearance. She is obese. She is not ill-appearing, toxic-appearing or diaphoretic. HENT:      Head: Normocephalic and atraumatic. Cardiovascular:      Rate and Rhythm: Normal rate and regular rhythm. Pulses: Normal pulses. Pulmonary:      Effort: Pulmonary effort is normal. No respiratory distress. Musculoskeletal:        Arms:       Comments: On the right upper extremity there is an area of swelling and redness with localized warmth. There is a small local lesion where you can see original spider bite but there is no significant necrosis. There is no fluctuance or abscess. The area is tender. It does not cross the shoulder joint. Joint above and below affected area are within normal limits. No associated bony tenderness. Right upper extremity is neurovascularly intact. Skin:     General: Skin is warm and dry. Neurological:      General: No focal deficit present. Mental Status: She is alert and oriented to person, place, and time.          DIAGNOSTIC RESULTS     LABS:  Labs Reviewed   COMPREHENSIVE METABOLIC PANEL - Abnormal; Notable for the following components:       Result Value    Glucose 168 (*) All other components within normal limits   COVID-19, RAPID   CULTURE, BLOOD 1   CULTURE, BLOOD 2   CBC WITH AUTO DIFFERENTIAL       All other labs were within normal range or not returned as of this dictation. EMERGENCY DEPARTMENT COURSE and DIFFERENTIAL DIAGNOSIS/MDM:   Vitals:    Vitals:    06/16/22 1740   BP: 112/68   Pulse: 90   Resp: 18   Temp: 98.6 °F (37 °C)   TempSrc: Oral   SpO2: 92%   Weight: 165 lb (74.8 kg)   Height: 5' 1\" (1.549 m)       MDM  Patient is a 74-year female presents the ER with a spider bite to the right upper arm. On physical exam, there is an area of swelling, redness, and warmth concerning for cellulitis secondary to the bite. While there is no obvious drainable abscess at this time, the patient has failed outpatient antibiotics. She has no signs of sepsis on her vital signs. She has no leukocytosis on her CBC. Blood cultures were obtained in the ER and then vancomycin and cefepime were administered. The patient was admitted to hospitalist.    CONSULTS:  Ebony Ribera, Accepting Hospitalist    FINAL IMPRESSION      1. Cellulitis of right upper extremity          DISPOSITION/PLAN   DISPOSITION Decision To Admit 06/16/2022 10:35:30 PM      PATIENT REFERRED TO:  No follow-up provider specified.     DISCHARGE MEDICATIONS:  New Prescriptions    No medications on file          (Please note that portions of this note were completed with a voice recognition program.  Efforts were made to edit thedictations but occasionally words are mis-transcribed.)    ROCK Moran (electronically signed)      Linda Moran  06/17/22 0010

## 2022-06-17 NOTE — PROGRESS NOTES
[facility] Hospitalists      Patient:  Anton Vu  YOB: 1945  Date of Service: 6/17/2022  MRN: [id number]   Acct: [de-identified]   Primary Care Physician: MIN Sahu  Advance Directive: Full Code  Admit Date: 6/16/2022       Hospital Day: 0  Portions of this note have been copied forward, however, changed to reflect the most current clinical status of this patient. CHIEF COMPLAINT Insect bite    SUBJECTIVE: States that her wound is feeling better. Reports that her pain and swelling has significantly improved with IV antibiotics. She does point to an area that she is concerned about that is where the bite was that is firmer. No fevers or issues overnight. CUMULATIVE HOSPITAL STAY:  Ms. Sonu Pina is a 50-year-old female with a PMH of diabetes, HTN, JOSE, and thyroid disease who presented to [facility] ED on 6/16/2022 after going to her PCPs office for a follow-up for a spider bite. She had taken p.o. antibiotics and the area spread and she had increased edema and erythema to her right upper arm. Denied fever or chills. She was admitted to hospitalist services for IV antibiotics and further monitoring. AM labs remained stable. Blood glucoses mildly increased. Area of flocculence to the right shoulder area, however, overall improvement in her arm overnight. Review of Systems:   Review of Systems   Constitutional: Negative for chills, diaphoresis, fatigue and fever. HENT: Negative for congestion, ear pain, sinus pain, sore throat and trouble swallowing. Eyes: Negative for visual disturbance. Respiratory: Negative for cough, shortness of breath and wheezing. Cardiovascular: Negative for chest pain, palpitations and leg swelling. Gastrointestinal: Negative for abdominal distention, abdominal pain, blood in stool, constipation, diarrhea, nausea and vomiting. Endocrine: Negative for cold intolerance and heat intolerance.    Genitourinary: Negative for difficulty urinating, flank pain, frequency and urgency. Musculoskeletal: Negative for arthralgias and myalgias. Skin: Positive for color change and wound. Neurological: Negative for dizziness, syncope, weakness, light-headedness, numbness and headaches. Hematological: Does not bruise/bleed easily. Psychiatric/Behavioral: Negative for agitation, confusion and dysphoric mood. 14 point review of systems is negative except as specifically addressed above. Objective:   VITALS:  BP (!) 94/55   Pulse 65   Temp 97.9 °F (36.6 °C) (Temporal)   Resp 18   Ht 5' 1\" (1.549 m)   Wt 165 lb (74.8 kg)   LMP  (LMP Unknown)   SpO2 91%   BMI 31.18 kg/m²   24HR INTAKE/OUTPUT:    Intake/Output Summary (Last 24 hours) at 6/17/2022 1735  Last data filed at 6/17/2022 1235  Gross per 24 hour   Intake 480 ml   Output --   Net 480 ml       Physical Exam  Constitutional:       General: She is not in acute distress. Appearance: Normal appearance. She is not toxic-appearing or diaphoretic. HENT:      Head: Normocephalic and atraumatic. Right Ear: External ear normal.      Left Ear: External ear normal.      Nose: Nose normal. No congestion or rhinorrhea. Mouth/Throat:      Mouth: Mucous membranes are moist.      Pharynx: Oropharynx is clear. Eyes:      General: No scleral icterus. Extraocular Movements: Extraocular movements intact. Conjunctiva/sclera: Conjunctivae normal.   Cardiovascular:      Rate and Rhythm: Normal rate and regular rhythm. Pulses: Normal pulses. Heart sounds: Normal heart sounds. No murmur heard. No friction rub. No gallop. Pulmonary:      Effort: Pulmonary effort is normal. No respiratory distress. Breath sounds: Normal breath sounds. No wheezing, rhonchi or rales. Abdominal:      General: Abdomen is flat. Bowel sounds are normal. There is no distension. Palpations: Abdomen is soft. Tenderness: There is no abdominal tenderness.    Musculoskeletal:         General: No swelling. Normal range of motion. Cervical back: Normal range of motion and neck supple. Right lower leg: No edema. Left lower leg: No edema. Skin:     General: Skin is warm and dry. Coloration: Skin is not jaundiced. Findings: Erythema present. No lesion or rash. Neurological:      General: No focal deficit present. Mental Status: She is alert and oriented to person, place, and time. Mental status is at baseline. Cranial Nerves: No cranial nerve deficit. Sensory: No sensory deficit. Motor: No weakness. Psychiatric:         Mood and Affect: Mood normal.         Behavior: Behavior normal.         Thought Content: Thought content normal.         Judgment: Judgment normal.             Medications:      sodium chloride        aspirin  81 mg Oral Daily    clopidogrel  75 mg Oral Daily    levothyroxine  150 mcg Oral Daily    lisinopril  10 mg Oral Daily    oxybutynin  10 mg Oral Daily    venlafaxine  150 mg Oral Daily with breakfast    sodium chloride flush  5-40 mL IntraVENous 2 times per day    enoxaparin  40 mg SubCUTAneous Daily    insulin lispro  0-12 Units SubCUTAneous TID WC    insulin lispro  0-6 Units SubCUTAneous Nightly    cefepime  2,000 mg IntraVENous Q12H    famotidine  20 mg Oral BID    vancomycin (VANCOCIN) intermittent dosing (placeholder)   Other RX Placeholder    vancomycin  1,250 mg IntraVENous Q18H     nitroGLYCERIN, sodium chloride flush, sodium chloride, ondansetron **OR** ondansetron, polyethylene glycol, acetaminophen **OR** acetaminophen, diphenhydrAMINE-zinc acetate  ADULT DIET;  Regular; 3 carb choices (45 gm/meal)     Lab and other Data:     Recent Labs     06/16/22 1950 06/17/22  0341   WBC 8.9 9.7   HGB 15.4 14.8    271     Recent Labs     06/16/22 1950 06/17/22  0341    139   K 4.0 4.1   CL 98 104   CO2 27 25   BUN 15 18   CREATININE 0.9 0.7   GLUCOSE 168* 160*     Recent Labs     06/16/22 1950 06/17/22  0341   AST 21 16   ALT 19 13   BILITOT <0.2 0.3   ALKPHOS 90 73     Troponin T: No results for input(s): TROPONINI in the last 72 hours. Pro-BNP: No results for input(s): BNP in the last 72 hours. INR:   Recent Labs     06/17/22  0341   INR 0.93       RAD:   No results found. Assessment/Plan   Principal Problem:    Right arm cellulitis/ Spider bite   -continue current antibiotics   -Pharmacy to dose vanco   -Monitor wound   -Strict BG control   -Daily labs   -Supportive care  Active Problems:    RLS (restless legs syndrome)        CPAP (continuous positive airway pressure) dependence/ Obstructive sleep apnea   -CPAP per ordered settings      Type 2 diabetes mellitus without complication, without long-term current use of insulin (HCC)   -Medium dose correctional protocol   -Carb control diet   -Accuchecks      Acquired hypothyroidism   -Continue home levothyroxine      Essential hypertension   -continue home meds   -monitor vital signs  Resolved Problems:    * No resolved hospital problems.  *      Antibiotic: Maxipime/Vanco    DVT Prophylaxis: Lovenox    GI prophylaxis:  Pepcid    Disposition: MIN Howard, 6/17/2022 5:35 PM

## 2022-06-17 NOTE — H&P
HISTORY AND PHYSICAL             Date: 6/17/2022        Patient Name: Mega Wilson     YOB: 1945      Age:  69 y.o. Chief Complaint     Chief Complaint   Patient presents with    Insect Bite     Pt arrived to the ed with c/o spider bite to right upper arm 4 days ago. Pt went to walk in clinic and was given antibiotics, but not getting any better. Patient is seen in Er   She is alert and oriented   Right upper arm with erythema   Biceps muscle area along right arm where she had spider bite   Not resolved with oral abx at home   Present since last week. History Obtained From   Patient     History of Present Illness   Patient is a 69year old female, who appears far younger than her stated age. She is independent and active and overall healthy. She has cad, type ii dm   Sustained insect (spider) bite to right upper arm, biceps area. She was seen by PCP and started on clindamycin   She has been diligent about taking the abx and finishing out the course of abx. Despite that she has not improved   Still with significant erythema along the right arm, that has not improved. She is seen at urgent care and was given abx and instructed that if sx worsened to go to ER    She  Is here as right arm is painful and red and swollen  She  Has very little pain   She has had no fever and chills   Just the swelling , fullness and redness not improving. She is being admitted for spider bite on right upper arm not responding to oral abx.          Past Medical History     Past Medical History:   Diagnosis Date    CAD (coronary artery disease)     Carotid artery disease (HCC)     CPAP (continuous positive airway pressure) dependence     11cm    Depression     Diabetes (HCC)     Fibromyalgia     Hypothyroidism     JOSE (obstructive sleep apnea)     AHI:  27.3 per PSG, 2011/CPAP    PLMD (periodic limb movement disorder)     RLS (restless legs syndrome)         Past Surgical History Past Surgical History:   Procedure Laterality Date    COLONOSCOPY  12/04/2014    Dr Severiano Byers- no polyps    COLONOSCOPY  07/25/2019    Dr Estefania Omalley, 5 yr recall    COLONOSCOPY N/A 7/25/2019    COLORECTAL CANCER SCREENING, NOT HIGH RISK performed by Taisha Sheehan MD at [facility]      teeth extractions    PTCA      with stents    TUBAL LIGATION          Medications Prior to Admission     Prior to Admission medications    Medication Sig Start Date End Date Taking? Authorizing Provider   clindamycin (CLEOCIN) 300 MG capsule Take 1 capsule by mouth 3 times daily for 10 days 6/11/22 6/21/22  MIN Dunne   venlafaxine (EFFEXOR XR) 150 MG extended release capsule TAKE 1 CAPSULE BY MOUTH EVERY DAY 7/6/32   MIN Izaguirre   lisinopril (PRINIVIL;ZESTRIL) 10 MG tablet TAKE 1 TABLET BY MOUTH EVERY DAY 2/2/52   MIN Izaguirre   oxybutynin (DITROPAN-XL) 10 MG extended release tablet TAKE 1 TABLET BY MOUTH EVERY DAY 7/21/47   MIN Izaguirre   metFORMIN (GLUCOPHAGE) 1000 MG tablet TAKE 1 TABLET BY MOUTH TWICE A DAY WITH MEALS 6/34/93   MIN Izaguirre   levothyroxine (SYNTHROID) 150 MCG tablet TAKE 1 TABLET BY MOUTH EVERY DAY 0/24/66   MIN Izaguirre   JARDIANCE 25 MG tablet TAKE 1 TABLET BY MOUTH EVERY DAY 4/20/55   MIN Izaguirre   glimepiride (AMARYL) 4 MG tablet TAKE 1 TABLET BY MOUTH TWICE A DAY 43/61/44   MIN Izaguirre   nitroGLYCERIN (NITROSTAT) 0.3 MG SL tablet Place 1 tablet under the tongue every 5 minutes as needed for Chest pain up to max of 3 total doses.  If no relief after 1 dose, call 449. 10/16/37   MIN Izaguirre   alendronate (FOSAMAX) 70 MG tablet TAKE 1 TABLET BY MOUTH ONE TIME PER WEEK 02/46/54   MIN Izaguirre   clopidogrel (PLAVIX) 75 MG tablet TAKE 1 TABLET BY MOUTH EVERY DAY 4/30/19   Davina Al MD   lovastatin (MEVACOR) 40 MG tablet TAKE 1 TABLET BY MOUTH AT BEDTIME 1/4/19 Anion Gap 12 7 - 19 mmol/L    Glucose 168 (H) 74 - 109 mg/dL    BUN 15 8 - 23 mg/dL    CREATININE 0.9 0.5 - 0.9 mg/dL    GFR Non-African American >60 >60    GFR African American >59 >59    Calcium 9.8 8.8 - 10.2 mg/dL    Total Protein 7.1 6.6 - 8.7 g/dL    Albumin 4.3 3.5 - 5.2 g/dL    Total Bilirubin <0.2 0.2 - 1.2 mg/dL    Alkaline Phosphatase 90 35 - 104 U/L    ALT 19 5 - 33 U/L    AST 21 5 - 32 U/L   COVID-19, Rapid    Collection Time: 06/16/22 10:50 PM    Specimen: Nasopharyngeal Swab   Result Value Ref Range    SARS-CoV-2, NAAT Not Detected Not Detected   POCT Glucose    Collection Time: 06/17/22  1:04 AM   Result Value Ref Range    POC Glucose 154 (H) 70 - 99 mg/dl    Performed on [employer]    Comprehensive Metabolic Panel w/ Reflex to MG    Collection Time: 06/17/22  3:41 AM   Result Value Ref Range    Sodium 139 136 - 145 mmol/L    Potassium reflex Magnesium 4.1 3.5 - 5.0 mmol/L    Chloride 104 98 - 111 mmol/L    CO2 25 22 - 29 mmol/L    Anion Gap 10 7 - 19 mmol/L    Glucose 160 (H) 74 - 109 mg/dL    BUN 18 8 - 23 mg/dL    CREATININE 0.7 0.5 - 0.9 mg/dL    GFR Non-African American >60 >60    GFR African American >59 >59    Calcium 9.1 8.8 - 10.2 mg/dL    Total Protein 6.0 (L) 6.6 - 8.7 g/dL    Albumin 3.9 3.5 - 5.2 g/dL    Total Bilirubin 0.3 0.2 - 1.2 mg/dL    Alkaline Phosphatase 73 35 - 104 U/L    ALT 13 5 - 33 U/L    AST 16 5 - 32 U/L   Lactic Acid    Collection Time: 06/17/22  3:41 AM   Result Value Ref Range    Lactic Acid 1.3 0.5 - 1.9 mmol/L   CBC with Auto Differential    Collection Time: 06/17/22  3:41 AM   Result Value Ref Range    WBC 9.7 4.8 - 10.8 K/uL    RBC 4.93 4.20 - 5.40 M/uL    Hemoglobin 14.8 12.0 - 16.0 g/dL    Hematocrit 45.7 37.0 - 47.0 %    MCV 92.7 81.0 - 99.0 fL    MCH 30.0 27.0 - 31.0 pg    MCHC 32.4 (L) 33.0 - 37.0 g/dL    RDW 13.3 11.5 - 14.5 %    Platelets 424 803 - 791 K/uL    MPV 10.5 9.4 - 12.3 fL    Neutrophils % 50.6 50.0 - 65.0 %    Lymphocytes % 34.6 20.0 - 40.0 % Monocytes % 8.2 0.0 - 10.0 %    Eosinophils % 5.1 (H) 0.0 - 5.0 %    Basophils % 0.8 0.0 - 1.0 %    Neutrophils Absolute 4.9 1.5 - 7.5 K/uL    Immature Granulocytes # 0.1 K/uL    Lymphocytes Absolute 3.4 1.1 - 4.5 K/uL    Monocytes Absolute 0.80 0.00 - 0.90 K/uL    Eosinophils Absolute 0.50 0.00 - 0.60 K/uL    Basophils Absolute 0.10 0.00 - 0.20 K/uL   Protime-INR    Collection Time: 06/17/22  3:41 AM   Result Value Ref Range    Protime 12.3 12.0 - 14.6 sec    INR 0.93 0.88 - 1.18        Imaging/Diagnostics Last 24 Hours       Assessment      Hospital Problems           Last Modified POA    * (Principal) Right arm cellulitis 6/17/2022 Yes    Spider bite 6/17/2022 Yes    RLS (restless legs syndrome) 6/17/2022 Yes    Obstructive sleep apnea 6/17/2022 Yes    Overview Signed 10/14/2017  7:30 PM by [facility]     Updating Deprecated Diagnoses         CPAP (continuous positive airway pressure) dependence 6/17/2022 Yes    Type 2 diabetes mellitus without complication, without long-term current use of insulin ([facility] [address] 6/17/2022 Yes    Acquired hypothyroidism 6/17/2022 Yes    Essential hypertension 6/17/2022 Yes          Plan   1. Right arm cellulitis related to spider bite and non healing with oral abx. Will admit for iv antibiotics and supportive care. She is immune suppressed due to dm type ii   Give vanc and cefepime   Want to avoid worsening infection and / or sepsis     2. Labs essentially unrevealing     3. Afebrile     4. Fluid bolus     5  Monitor blood sugars and insulin sliding scale     6.  dvt and gi prophylaxis.      Consultations Ordered:  PHARMACY TO DOSE VANCOMYCIN  IP CONSULT TO RESPIRATORY CARE    Electronically signed by Sirisha Lozoya MD on 6/16/22 at 11:53 PM CDT

## 2022-06-17 NOTE — PROGRESS NOTES
Pharmacy Intravenous to Oral Protocol    Medication changed per [facility] IV to PO protocol: Famotidine     Patient meets the following inclusion criteria and none of the exclusion criteria:    Inclusion criteria:  - IV therapy > 24 hours (antibiotics only)  - Tolerating diet more advanced than clear liquids  - Tolerating PO medications  - No vasopressor blood pressure support (ie no signs of shock)  - Patient hasn't had a seizure for 72 hrs (antiepileptic medications only)    Exclusion criteria:  - Infections requiring IV therapy (ie meningitis, endocarditis, osteomyelitis, pancreatitis)   - Nausea and/or vomiting or severe diarrhea within past 24 hours   - Has gastrectomy, ileus, gastric outlet or bowel obstruction, or malabsorption syndromes   - Has significant painful oral ulceration   - TPN with an NPO order   - Active GI bleed   - Unable to swallow   - NPO   - Febrile in the last 24 hours (antibiotics only)   - Clinical deteriorating or unstable (antibiotics only)   - Pediatric patients and patients who are not euthyroid (not on oral levothyroxine/not stabilized on oral levothyroxine)- Levothyroxine only     Electronically signed by ANTONIO Nguyen [facility] on 6/17/2022 at 1:58 PM

## 2022-06-17 NOTE — PROGRESS NOTES
[address] [facility] Pharmacokinetic Monitoring Service - Vancomycin    Consulting Provider: Silvino Denny   Indication: Skin/Soft Tissue Infection  Target Concentration: Goal trough of 10-15 mg/L and AUC/DAVE <500 mg*hr/L  Day of Therapy: 2  Additional Antimicrobials: Maxipime    Pertinent Laboratory Values: Wt Readings from Last 1 Encounters:   06/16/22 165 lb (74.8 kg)     Temp Readings from Last 1 Encounters:   06/17/22 96.8 °F (36 °C) (Temporal)     Estimated Creatinine Clearance: 49 mL/min (based on SCr of 0.9 mg/dL). Recent Labs     06/16/22  1950 06/17/22  0341   CREATININE 0.9  --    WBC 8.9 9.7     Procalcitonin: N/A    Pertinent Cultures: No current cultures at this time  Culture Date Source Results        MRSA Nasal Swab: N/A. Non-respiratory infection.     Recent vancomycin administrations                     vancomycin (VANCOCIN) 1,500 mg in dextrose 5 % 500 mL IVPB (mg) 1,500 mg New Bag 06/16/22 2041                      Plan:  Continue current dosing regimen   Start Vancomycin 1250mg Q18hr  Repeat vancomycin concentration ordered for 06/19/22 @ 0230     Thank you for the consult,  Freada Guise, ALVAREZ [facility]  6/17/2022 4:07 AM

## 2022-06-18 VITALS
DIASTOLIC BLOOD PRESSURE: 76 MMHG | SYSTOLIC BLOOD PRESSURE: 100 MMHG | RESPIRATION RATE: 14 BRPM | WEIGHT: 165 LBS | BODY MASS INDEX: 31.15 KG/M2 | HEART RATE: 60 BPM | HEIGHT: 61 IN | TEMPERATURE: 96.4 F | OXYGEN SATURATION: 92 %

## 2022-06-18 LAB
ANION GAP SERPL CALCULATED.3IONS-SCNC: 11 MMOL/L (ref 7–19)
BASOPHILS ABSOLUTE: 0.1 K/UL (ref 0–0.2)
BASOPHILS RELATIVE PERCENT: 1.2 % (ref 0–1)
BUN BLDV-MCNC: 13 MG/DL (ref 8–23)
CALCIUM SERPL-MCNC: 8.9 MG/DL (ref 8.8–10.2)
CHLORIDE BLD-SCNC: 106 MMOL/L (ref 98–111)
CO2: 24 MMOL/L (ref 22–29)
CREAT SERPL-MCNC: 0.6 MG/DL (ref 0.5–0.9)
EOSINOPHILS ABSOLUTE: 0.4 K/UL (ref 0–0.6)
EOSINOPHILS RELATIVE PERCENT: 5.5 % (ref 0–5)
GFR AFRICAN AMERICAN: >59
GFR NON-AFRICAN AMERICAN: >60
GLUCOSE BLD-MCNC: 140 MG/DL (ref 74–109)
GLUCOSE BLD-MCNC: 152 MG/DL (ref 70–99)
GLUCOSE BLD-MCNC: 182 MG/DL (ref 70–99)
HCT VFR BLD CALC: 44.5 % (ref 37–47)
HEMOGLOBIN: 14.7 G/DL (ref 12–16)
IMMATURE GRANULOCYTES #: 0.1 K/UL
LYMPHOCYTES ABSOLUTE: 2.9 K/UL (ref 1.1–4.5)
LYMPHOCYTES RELATIVE PERCENT: 42.4 % (ref 20–40)
MCH RBC QN AUTO: 30.1 PG (ref 27–31)
MCHC RBC AUTO-ENTMCNC: 33 G/DL (ref 33–37)
MCV RBC AUTO: 91 FL (ref 81–99)
MONOCYTES ABSOLUTE: 0.5 K/UL (ref 0–0.9)
MONOCYTES RELATIVE PERCENT: 7.7 % (ref 0–10)
NEUTROPHILS ABSOLUTE: 2.9 K/UL (ref 1.5–7.5)
NEUTROPHILS RELATIVE PERCENT: 42.5 % (ref 50–65)
PDW BLD-RTO: 13.3 % (ref 11.5–14.5)
PERFORMED ON: ABNORMAL
PERFORMED ON: ABNORMAL
PLATELET # BLD: 257 K/UL (ref 130–400)
PMV BLD AUTO: 10.1 FL (ref 9.4–12.3)
POTASSIUM SERPL-SCNC: 4.2 MMOL/L (ref 3.5–5)
RBC # BLD: 4.89 M/UL (ref 4.2–5.4)
SODIUM BLD-SCNC: 141 MMOL/L (ref 136–145)
WBC # BLD: 6.9 K/UL (ref 4.8–10.8)

## 2022-06-18 PROCEDURE — 2580000003 HC RX 258: Performed by: INTERNAL MEDICINE

## 2022-06-18 PROCEDURE — 6360000002 HC RX W HCPCS: Performed by: PHYSICIAN ASSISTANT

## 2022-06-18 PROCEDURE — 82947 ASSAY GLUCOSE BLOOD QUANT: CPT

## 2022-06-18 PROCEDURE — 6360000002 HC RX W HCPCS: Performed by: INTERNAL MEDICINE

## 2022-06-18 PROCEDURE — 85025 COMPLETE CBC W/AUTO DIFF WBC: CPT

## 2022-06-18 PROCEDURE — 80048 BASIC METABOLIC PNL TOTAL CA: CPT

## 2022-06-18 PROCEDURE — 94660 CPAP INITIATION&MGMT: CPT

## 2022-06-18 PROCEDURE — 2580000003 HC RX 258: Performed by: PHYSICIAN ASSISTANT

## 2022-06-18 PROCEDURE — 36415 COLL VENOUS BLD VENIPUNCTURE: CPT

## 2022-06-18 PROCEDURE — 6370000000 HC RX 637 (ALT 250 FOR IP): Performed by: INTERNAL MEDICINE

## 2022-06-18 RX ORDER — CEPHALEXIN 500 MG/1
500 CAPSULE ORAL 2 TIMES DAILY
Qty: 10 CAPSULE | Refills: 0 | Status: SHIPPED | OUTPATIENT
Start: 2022-06-18 | End: 2022-06-23

## 2022-06-18 RX ADMIN — CLOPIDOGREL BISULFATE 75 MG: 75 TABLET ORAL at 08:18

## 2022-06-18 RX ADMIN — INSULIN LISPRO 2 UNITS: 100 INJECTION, SOLUTION INTRAVENOUS; SUBCUTANEOUS at 08:20

## 2022-06-18 RX ADMIN — CEFEPIME HYDROCHLORIDE 2000 MG: 2 INJECTION, POWDER, FOR SOLUTION INTRAVENOUS at 04:15

## 2022-06-18 RX ADMIN — OXYBUTYNIN CHLORIDE 10 MG: 5 TABLET, EXTENDED RELEASE ORAL at 08:17

## 2022-06-18 RX ADMIN — LEVOTHYROXINE SODIUM 150 MCG: 25 TABLET ORAL at 08:17

## 2022-06-18 RX ADMIN — ENOXAPARIN SODIUM 40 MG: 100 INJECTION SUBCUTANEOUS at 08:18

## 2022-06-18 RX ADMIN — FAMOTIDINE 20 MG: 20 TABLET ORAL at 08:18

## 2022-06-18 RX ADMIN — VENLAFAXINE HYDROCHLORIDE 150 MG: 75 CAPSULE, EXTENDED RELEASE ORAL at 08:17

## 2022-06-18 RX ADMIN — ASPIRIN 81 MG 81 MG: 81 TABLET ORAL at 08:17

## 2022-06-18 RX ADMIN — VANCOMYCIN HYDROCHLORIDE 1250 MG: 10 INJECTION, POWDER, LYOPHILIZED, FOR SOLUTION INTRAVENOUS at 08:41

## 2022-06-18 RX ADMIN — INSULIN LISPRO 2 UNITS: 100 INJECTION, SOLUTION INTRAVENOUS; SUBCUTANEOUS at 12:24

## 2022-06-18 NOTE — DISCHARGE SUMMARY
Johan Calhoun  :  1945  MRN:  [id number]    Admit date:  2022  Discharge date:  2022    Discharging Physician:  Dr. Geoff Morley    Advance Directive: Full Code    Consults: Checo Adams TO RESPIRATORY CARE     Primary Care Physician:  MIN Kate    Discharge Diagnoses:  Principal Problem:    Right arm cellulitis  Active Problems:    Spider bite    RLS (restless legs syndrome)    Obstructive sleep apnea    CPAP (continuous positive airway pressure) dependence    Type 2 diabetes mellitus without complication, without long-term current use of insulin ([facility])    Acquired hypothyroidism    Essential hypertension  Resolved Problems:    * No resolved hospital problems. *      Portions of this note have been copied forward, however, changed to reflect the most current clinical status of this patient. Hospital Course:   Ms. Maryuri Ortiz is a 71-year-old female with a PMH of diabetes, HTN, JOSE, and thyroid disease who presented to [facility] ED on 2022 after going to her PCPs office for a follow-up for a spider bite. She had taken p.o. antibiotics and the area spread and she had increased edema and erythema to her right upper arm. Denied fever or chills. She was admitted to hospitalist services for IV antibiotics and further monitoring. She was placed on vancomycin and cefepime upon admission. She is received multiple doses and has seen a significant improvement the area of complaint to the right upper arm. There is no drainage to the area. Areas of questionable flocculation have resolved. She denies pain. She remains afebrile. Her labs and vitals remained stable. Blood cultures remain negative. She is medically and clinically stable for discharge and will be discharged home on Keflex. She is to follow-up with her PCP within 1 week for hospital follow-up.   She will be discharged home in stable condition    Significant Diagnostic Studies:   No results found. Pertinent Labs:   CBC: Recent Labs     06/16/22  1950 06/17/22  0341 06/18/22  0453   WBC 8.9 9.7 6.9   HGB 15.4 14.8 14.7    271 257     BMP:    Recent Labs     06/16/22 1950 06/17/22  0341 06/18/22  0453    139 141   K 4.0 4.1 4.2   CL 98 104 106   CO2 27 25 24   BUN 15 18 13   CREATININE 0.9 0.7 0.6   GLUCOSE 168* 160* 140*     INR:   Recent Labs     06/17/22  0341   INR 0.93       Physical Exam:  Vital Signs: /76   Pulse 60   Temp (!) 96.4 °F (35.8 °C) (Temporal)   Resp 14   Ht 5' 1\" (1.549 m)   Wt 165 lb (74.8 kg)   LMP  (LMP Unknown)   SpO2 92%   BMI 31.18 kg/m²   Physical Exam  Vitals and nursing note reviewed. Constitutional:       General: She is not in acute distress. Appearance: Normal appearance. She is not toxic-appearing or diaphoretic. HENT:      Head: Normocephalic and atraumatic. Right Ear: External ear normal.      Left Ear: External ear normal.      Nose: Nose normal. No congestion or rhinorrhea. Mouth/Throat:      Mouth: Mucous membranes are moist.      Pharynx: Oropharynx is clear. Eyes:      General: No scleral icterus. Extraocular Movements: Extraocular movements intact. Conjunctiva/sclera: Conjunctivae normal.   Cardiovascular:      Rate and Rhythm: Normal rate and regular rhythm. Pulses: Normal pulses. Heart sounds: Normal heart sounds. No murmur heard. No friction rub. No gallop. Pulmonary:      Effort: Pulmonary effort is normal. No respiratory distress. Breath sounds: Normal breath sounds. No wheezing, rhonchi or rales. Abdominal:      General: Abdomen is flat. Bowel sounds are normal. There is no distension. Palpations: Abdomen is soft. Tenderness: There is no abdominal tenderness. Musculoskeletal:         General: No swelling. Normal range of motion. Cervical back: Normal range of motion and neck supple. Right lower leg: No edema. Left lower leg: No edema.    Skin: General: Skin is warm and dry. Coloration: Skin is not jaundiced. Findings: Erythema (RUE at the bicep area. No warmth or edema noted) present. No lesion or rash. Neurological:      General: No focal deficit present. Mental Status: She is alert and oriented to person, place, and time. Mental status is at baseline. Cranial Nerves: No cranial nerve deficit. Sensory: No sensory deficit. Motor: No weakness. Psychiatric:         Mood and Affect: Mood normal.         Behavior: Behavior normal.         Thought Content: Thought content normal.         Judgment: Judgment normal.         Discharge Medications:         Medication List      START taking these medications    cephALEXin 500 MG capsule  Commonly known as: KEFLEX  Take 1 capsule by mouth 2 times daily for 5 days        CONTINUE taking these medications    alendronate 70 MG tablet  Commonly known as: FOSAMAX  TAKE 1 TABLET BY MOUTH ONE TIME PER WEEK     aspirin 81 MG tablet     clindamycin 300 MG capsule  Commonly known as: CLEOCIN  Take 1 capsule by mouth 3 times daily for 10 days     clopidogrel 75 MG tablet  Commonly known as: PLAVIX  TAKE 1 TABLET BY MOUTH EVERY DAY     glimepiride 4 MG tablet  Commonly known as: AMARYL  TAKE 1 TABLET BY MOUTH TWICE A DAY     Jardiance 25 MG tablet  Generic drug: empagliflozin  TAKE 1 TABLET BY MOUTH EVERY DAY     levothyroxine 150 MCG tablet  Commonly known as: SYNTHROID  TAKE 1 TABLET BY MOUTH EVERY DAY     lisinopril 10 MG tablet  Commonly known as: PRINIVIL;ZESTRIL  TAKE 1 TABLET BY MOUTH EVERY DAY     lovastatin 40 MG tablet  Commonly known as: MEVACOR  TAKE 1 TABLET BY MOUTH AT BEDTIME     metFORMIN 1000 MG tablet  Commonly known as: GLUCOPHAGE  TAKE 1 TABLET BY MOUTH TWICE A DAY WITH MEALS     nitroGLYCERIN 0.3 MG SL tablet  Commonly known as: Nitrostat  Place 1 tablet under the tongue every 5 minutes as needed for Chest pain up to max of 3 total doses.  If no relief after 1 dose, call 911. oxybutynin 10 MG extended release tablet  Commonly known as: DITROPAN-XL  TAKE 1 TABLET BY MOUTH EVERY DAY     venlafaxine 150 MG extended release capsule  Commonly known as: EFFEXOR XR  TAKE 1 CAPSULE BY MOUTH EVERY DAY     vitamin B-12 1000 MCG tablet  Commonly known as: CYANOCOBALAMIN     vitamin D 1000 UNIT Tabs tablet  Commonly known as: CHOLECALCIFEROL           Where to Get Your Medications      These medications were sent to [facility]/pharmacy [address] [place] [address], [address] [place] [address] [address], [address]    Phone: [phone]   · cephALEXin 500 MG capsule         Discharge Instructions: Follow up with MIN Jones in approximately 1 week for hospital follow-up take medications as directed. Resume activity as tolerated. Diet: ADULT DIET; Regular; 3 carb choices (45 gm/meal)     Disposition: Patient is Stableand will be discharged to Home. Time spent on discharge 36 minutes spent in assessing patient, reviewing medications, discussion with nursing, confirming safe discharge plan and preparation of discharge summary.     Signed:  Carletha Habermann, APRN, 6/18/2022 12:13 PM

## 2022-06-18 NOTE — PROGRESS NOTES
The patients IV has been removed and the cannula is intact. Discharge instructions have been provided to the patient and she verbalized understanding. The patient remains in her room with her call light in reach.

## 2022-06-20 ENCOUNTER — CARE COORDINATION (OUTPATIENT)
Dept: CASE MANAGEMENT | Age: 77
End: 2022-06-20

## 2022-06-20 NOTE — CARE COORDINATION
[facility] Transitions Initial Follow Up Call    Call within 2 business days of discharge: Yes    Patient: Dean Pacheco Patient : 1945   MRN: <[id number]>  Reason for Admission: cellulitis  Discharge Date: 22 RARS: Readmission Risk Score: 6.8 ( )      Last Discharge [facility]       Complaint Diagnosis Description Type Department Provider    22 Insect Bite Cellulitis of right upper extremity ED to Hosp-Admission (Discharged) (ADMITTED) L 5 SURG Humza Michaels MD; [address] [facility]. .. Spoke with: Piedad Lombard    Non-face-to-face services provided:  Obtained and reviewed discharge summary and/or continuity of care documents  Education of patient/family/caregiver/guardian to support self-management-   Assessment and support for treatment adherence and medication management-     Care Transitions 24 Hour Call    Care Transitions Interventions         Follow Up  Future Appointments   Date Time Provider Lila Otoole   3/50/3340 49:58 AM MIN [place] [facility]     Transitions of Care Initial Call    Was this an external facility discharge? No     Challenges to be reviewed by the provider   Additional needs identified to be addressed with provider: No  none             Method of communication with provider : none    Care Transition Nurse contacted the patient by telephone to perform post hospital discharge assessment. Verified name and  with patient as identifiers. Provided introduction to self, and explanation of the CTN role. CTN reviewed discharge instructions, medical action plan and red flags with patient who verbalized understanding. Patient given an opportunity to ask questions and does not have any further questions or concerns at this time. Were discharge instructions available to patient? Yes. Reviewed appropriate site of care based on symptoms and resources available to patient including: PCP  When to call 911.  The patient agrees to contact the PCP office for questions related to their healthcare. Medication reconciliation was performed with patient, who verbalizes understanding of administration of home medications. Advised obtaining a 90-day supply of all daily and as-needed medications. Was patient discharged with a pulse oximeter? no    CTN provided contact information. Plan for follow-up call in 5-7 days based on severity of symptoms and risk factors. Plan for next call: symptom management-     States she's doing well. Reports her arm is still swollen but looking better. States her pain has subsided. She is taking her antibiotics and all other medications as prescribed. Denies fever or flu-like symptoms. She agrees to schedule her f/u appt and declines assistance with this. Denies any questions or concerns at this time.

## 2022-06-21 LAB
BLOOD CULTURE, ROUTINE: NORMAL
CULTURE, BLOOD 2: NORMAL

## 2022-06-23 ENCOUNTER — OFFICE VISIT (OUTPATIENT)
Dept: PRIMARY CARE CLINIC | Age: 77
End: 2022-06-23
Payer: MEDICARE

## 2022-06-23 VITALS
BODY MASS INDEX: 32.02 KG/M2 | WEIGHT: 169.6 LBS | OXYGEN SATURATION: 96 % | HEIGHT: 61 IN | TEMPERATURE: 97.6 F | HEART RATE: 66 BPM | DIASTOLIC BLOOD PRESSURE: 58 MMHG | SYSTOLIC BLOOD PRESSURE: 118 MMHG

## 2022-06-23 DIAGNOSIS — Z09 HOSPITAL DISCHARGE FOLLOW-UP: Primary | ICD-10-CM

## 2022-06-23 DIAGNOSIS — I95.9 HYPOTENSION, UNSPECIFIED HYPOTENSION TYPE: ICD-10-CM

## 2022-06-23 DIAGNOSIS — E03.9 ACQUIRED HYPOTHYROIDISM: ICD-10-CM

## 2022-06-23 LAB
ALBUMIN SERPL-MCNC: 4.2 G/DL (ref 3.5–5.2)
ALP BLD-CCNC: 67 U/L (ref 35–104)
ALT SERPL-CCNC: 13 U/L (ref 5–33)
ANION GAP SERPL CALCULATED.3IONS-SCNC: 9 MMOL/L (ref 7–19)
AST SERPL-CCNC: 19 U/L (ref 5–32)
BASOPHILS ABSOLUTE: 0.1 K/UL (ref 0–0.2)
BASOPHILS RELATIVE PERCENT: 1.6 % (ref 0–1)
BILIRUB SERPL-MCNC: 0.3 MG/DL (ref 0.2–1.2)
BUN BLDV-MCNC: 15 MG/DL (ref 8–23)
CALCIUM SERPL-MCNC: 9.8 MG/DL (ref 8.8–10.2)
CHLORIDE BLD-SCNC: 103 MMOL/L (ref 98–111)
CO2: 29 MMOL/L (ref 22–29)
CREAT SERPL-MCNC: 0.8 MG/DL (ref 0.5–0.9)
EOSINOPHILS ABSOLUTE: 0.3 K/UL (ref 0–0.6)
EOSINOPHILS RELATIVE PERCENT: 3.5 % (ref 0–5)
GFR AFRICAN AMERICAN: >59
GFR NON-AFRICAN AMERICAN: >60
GLUCOSE BLD-MCNC: 80 MG/DL (ref 74–109)
HCT VFR BLD CALC: 45 % (ref 37–47)
HEMOGLOBIN: 14.2 G/DL (ref 12–16)
IMMATURE GRANULOCYTES #: 0.1 K/UL
LYMPHOCYTES ABSOLUTE: 3 K/UL (ref 1.1–4.5)
LYMPHOCYTES RELATIVE PERCENT: 33.4 % (ref 20–40)
MCH RBC QN AUTO: 29.6 PG (ref 27–31)
MCHC RBC AUTO-ENTMCNC: 31.6 G/DL (ref 33–37)
MCV RBC AUTO: 93.9 FL (ref 81–99)
MONOCYTES ABSOLUTE: 0.7 K/UL (ref 0–0.9)
MONOCYTES RELATIVE PERCENT: 8.1 % (ref 0–10)
NEUTROPHILS ABSOLUTE: 4.7 K/UL (ref 1.5–7.5)
NEUTROPHILS RELATIVE PERCENT: 52.6 % (ref 50–65)
PDW BLD-RTO: 13.3 % (ref 11.5–14.5)
PLATELET # BLD: 291 K/UL (ref 130–400)
PMV BLD AUTO: 11 FL (ref 9.4–12.3)
POTASSIUM SERPL-SCNC: 4.2 MMOL/L (ref 3.5–5)
RBC # BLD: 4.79 M/UL (ref 4.2–5.4)
SODIUM BLD-SCNC: 141 MMOL/L (ref 136–145)
TOTAL PROTEIN: 6.7 G/DL (ref 6.6–8.7)
TSH SERPL DL<=0.05 MIU/L-ACNC: 4.43 UIU/ML (ref 0.27–4.2)
WBC # BLD: 8.9 K/UL (ref 4.8–10.8)

## 2022-06-23 PROCEDURE — 1111F DSCHRG MED/CURRENT MED MERGE: CPT | Performed by: NURSE PRACTITIONER

## 2022-06-23 PROCEDURE — 99495 TRANSJ CARE MGMT MOD F2F 14D: CPT | Performed by: NURSE PRACTITIONER

## 2022-06-23 SDOH — ECONOMIC STABILITY: FOOD INSECURITY: WITHIN THE PAST 12 MONTHS, YOU WORRIED THAT YOUR FOOD WOULD RUN OUT BEFORE YOU GOT MONEY TO BUY MORE.: NEVER TRUE

## 2022-06-23 SDOH — ECONOMIC STABILITY: FOOD INSECURITY: WITHIN THE PAST 12 MONTHS, THE FOOD YOU BOUGHT JUST DIDN'T LAST AND YOU DIDN'T HAVE MONEY TO GET MORE.: NEVER TRUE

## 2022-06-23 ASSESSMENT — SOCIAL DETERMINANTS OF HEALTH (SDOH): HOW HARD IS IT FOR YOU TO PAY FOR THE VERY BASICS LIKE FOOD, HOUSING, MEDICAL CARE, AND HEATING?: NOT VERY HARD

## 2022-06-23 NOTE — PROGRESS NOTES
Post-Discharge Transitional Care  Follow Up      Anton Vu   YOB: 1945    Date of Office Visit:  6/23/2022  Date of Hospital Admission: 6/16/22  Date of Hospital Discharge: 6/18/22  Risk of hospital readmission (high >=14%. Medium >=10%) :Readmission Risk Score: 6.8 ( )      Care management risk score Rising risk (score 2-5) and Complex Care (Scores >=6): 1     Non face to face  following discharge, date last encounter closed (first attempt may have been earlier): 6/20/2022 12:22 PM    Call initiated 2 business days of discharge: Yes    ASSESSMENT/PLAN:   Hospital discharge follow-up  -     [place] DISCHARGE MEDS RECONCILED W/ CURRENT OUTPATIENT MED LIST  Hypotension, unspecified hypotension type      Medical Decision Making: moderate complexity  No follow-ups on file. On this date 6/23/2022 I have spent 20 minutes reviewing previous notes, test results and face to face with the patient discussing the diagnosis and importance of compliance with the treatment plan as well as documenting on the day of the visit. Subjective:   HPI:  Follow up of Hospital problems/diagnosis(es):     Inpatient course: Discharge summary reviewed- see chart. Interval history/Current status:     Patient presents today for hospital follow-up. She was seen at [facility] for swelling and redness of right upper arm; she suspected spider bite. She was given Clindamycin and did not have improvement; she went to ED 5 days later and was admitted for IV antiobiotics. She improved with vancomycin and cefepime. Negative blood cultures. Discharged on keflex on 6/18/22. She reports this has healed significantly. Today her blood pressure is in the lower range; she denies dizziness, blood in stool, headache, tachycardia. I rechecked her BP and did get 96/56 which is unusual for her. She is not on any anti-hypertensives. Will recheck labs today and she is going to monitor at home.        \"Hospital Course:   Ms. Sonu Pina is a 71-year-old female with a PMH of diabetes, HTN, JOSE, and thyroid disease who presented to [address] [place] [address] ED on 6/16/2022 after going to her PCPs office for a follow-up for a spider bite.  She had taken p.o. antibiotics and the area spread and she had increased edema and erythema to her right upper arm.  Denied fever or chills.  She was admitted to hospitalist services for IV antibiotics and further monitoring. She was placed on vancomycin and cefepime upon admission. She is received multiple doses and has seen a significant improvement the area of complaint to the right upper arm. There is no drainage to the area. Areas of questionable flocculation have resolved. She denies pain. She remains afebrile. Her labs and vitals remained stable. Blood cultures remain negative. She is medically and clinically stable for discharge and will be discharged home on Keflex. She is to follow-up with her PCP within 1 week for hospital follow-up. She will be discharged home in stable condition. \"    Patient Active Problem List   Diagnosis    JOSE (obstructive sleep apnea)    PLMD (periodic limb movement disorder)    RLS (restless legs syndrome)    Obstructive sleep apnea    Periodic limb movement disorder    Restless leg syndrome    CPAP (continuous positive airway pressure) dependence    Headache    History of colon polyps    Type 2 diabetes mellitus without complication, without long-term current use of insulin ([facility] [address]    Acquired hypothyroidism    Essential hypertension    Right arm cellulitis    Spider bite       Medications listed as ordered at the time of discharge from hospital     Medication List          Accurate as of June 23, 2022  1:52 PM. If you have any questions, ask your nurse or doctor.             CONTINUE taking these medications    alendronate 70 MG tablet  Commonly known as: FOSAMAX  TAKE 1 TABLET BY MOUTH ONE TIME PER WEEK     aspirin 81 MG tablet     cephALEXin 500 MG capsule  Commonly known as: KEFLEX  Take 1 capsule by mouth 2 times daily for 5 days     clopidogrel 75 MG tablet  Commonly known as: PLAVIX  TAKE 1 TABLET BY MOUTH EVERY DAY     glimepiride 4 MG tablet  Commonly known as: AMARYL  TAKE 1 TABLET BY MOUTH TWICE A DAY     Jardiance 25 MG tablet  Generic drug: empagliflozin  TAKE 1 TABLET BY MOUTH EVERY DAY     levothyroxine 150 MCG tablet  Commonly known as: SYNTHROID  TAKE 1 TABLET BY MOUTH EVERY DAY     lisinopril 10 MG tablet  Commonly known as: PRINIVIL;ZESTRIL  TAKE 1 TABLET BY MOUTH EVERY DAY     lovastatin 40 MG tablet  Commonly known as: MEVACOR  TAKE 1 TABLET BY MOUTH AT BEDTIME     metFORMIN 1000 MG tablet  Commonly known as: GLUCOPHAGE  TAKE 1 TABLET BY MOUTH TWICE A DAY WITH MEALS     nitroGLYCERIN 0.3 MG SL tablet  Commonly known as: Nitrostat  Place 1 tablet under the tongue every 5 minutes as needed for Chest pain up to max of 3 total doses. If no relief after 1 dose, call 911.      oxybutynin 10 MG extended release tablet  Commonly known as: DITROPAN-XL  TAKE 1 TABLET BY MOUTH EVERY DAY     venlafaxine 150 MG extended release capsule  Commonly known as: EFFEXOR XR  TAKE 1 CAPSULE BY MOUTH EVERY DAY     vitamin B-12 1000 MCG tablet  Commonly known as: CYANOCOBALAMIN     vitamin D 1000 UNIT Tabs tablet  Commonly known as: CHOLECALCIFEROL              Medications marked \"taking\" at this time  Outpatient Medications Marked as Taking for the 6/23/22 encounter (Office Visit) with MIN Taylor   Medication Sig Dispense Refill    cephALEXin (KEFLEX) 500 MG capsule Take 1 capsule by mouth 2 times daily for 5 days 10 capsule 0    venlafaxine (EFFEXOR XR) 150 MG extended release capsule TAKE 1 CAPSULE BY MOUTH EVERY DAY 90 capsule 1    lisinopril (PRINIVIL;ZESTRIL) 10 MG tablet TAKE 1 TABLET BY MOUTH EVERY DAY 90 tablet 1    oxybutynin (DITROPAN-XL) 10 MG extended release tablet TAKE 1 TABLET BY MOUTH EVERY DAY 90 tablet 0    metFORMIN (GLUCOPHAGE) 1000 MG tablet TAKE 1 TABLET auscultation bilaterally- no wheezes, rales or rhonchi, normal air movement, no respiratory distress  Cardiovascular: normal rate, regular rhythm, normal S1 and S2, no murmurs, rubs, clicks, or gallops, distal pulses intact, no carotid bruits  Abdomen: soft, non-tender, non-distended, normal bowel sounds, no masses or organomegaly  Extremities: no cyanosis, clubbing or edema  Musculoskeletal: normal range of motion, no joint swelling, deformity or tenderness  Neurologic: reflexes normal and symmetric, no cranial nerve deficit, gait, coordination and speech normal      An electronic signature was used to authenticate this note.   --Maritza Schilder, APRN

## 2022-06-24 ENCOUNTER — CARE COORDINATION (OUTPATIENT)
Dept: CASE MANAGEMENT | Age: 77
End: 2022-06-24

## 2022-06-24 NOTE — CARE COORDINATION
[facility] Transitions Follow Up Call    2022    Patient: Toni Head  Patient : 1945   MRN: [id number]  Reason for Admission: Cellulitis Upper Extremity  Discharge Date: 22 RARS: Readmission Risk Score: 6.8 ( )         Spoke with: N/A    Care Transitions Subsequent and Final Call    Subsequent and Final Calls  Care Transitions Interventions  Other Interventions: Follow Up  Future Appointments   Date Time Provider Lila Otoole   6184 61:76 AM [facility]     Attempted to make contact with patient/caregiver for a routine Care Transitions Coordination follow up call without success. Unable to leave a HIPAA compliant message regarding intent of call and call back information. CTN to follow up at another time.      Renuka Yeager RN

## 2022-06-29 ENCOUNTER — CARE COORDINATION (OUTPATIENT)
Dept: CASE MANAGEMENT | Age: 77
End: 2022-06-29

## 2022-06-29 NOTE — CARE COORDINATION
[facility] Transitions Follow Up Call    2022    Patient: Jacky Kenney  Patient : 1945   MRN: [id number]  Reason for Admission: Cellulitis   Discharge Date: 22 RARS: Readmission Risk Score: 6.8 ( )    Called patients phone someone picked up but didn't say anything. You could hear talking in the background. I hung up and called again no answer. Care Transitions Subsequent and Final Call    Subsequent and Final Calls  Care Transitions Interventions  Other Interventions:            Follow Up  Future Appointments   Date Time Provider Lila Otoole    15:76 AM [id number] [facility], APRN [facility] Kareen Cody LPN

## 2022-07-06 ENCOUNTER — CARE COORDINATION (OUTPATIENT)
Dept: CASE MANAGEMENT | Age: 77
End: 2022-07-06

## 2022-07-06 NOTE — CARE COORDINATION
[facility] Transitions Follow Up Call    2022    Patient: Philip Celestin  Patient : 1945   MRN: [id number]    Discharge Date: 22 RARS: Readmission Risk Score: 6.8 ( )         Spoke with: N/A    Care Transitions Subsequent and Final Call    Subsequent and Final Calls  Care Transitions Interventions  Other Interventions: Follow Up  Future Appointments   Date Time Provider Lila Otoole   3/27/4314 33:50 AM MIN [facility]     Attempted to make contact with patient/caregiver for a routine Care Transitions Coordination follow up call without success. Unable to leave a HIPAA compliant message regarding intent of call and call back information. Call was forwarded to an unidentifiable voice messaging system/mobile voice mail. As this repeated attempt to make contact was unsuccessful, will disengage at this time.       Jose Antonio Yung RN

## 2022-07-08 DIAGNOSIS — E03.9 ACQUIRED HYPOTHYROIDISM: ICD-10-CM

## 2022-07-08 RX ORDER — LEVOTHYROXINE SODIUM 0.12 MG/1
TABLET ORAL
Qty: 90 TABLET | OUTPATIENT
Start: 2022-07-08

## 2022-07-27 ENCOUNTER — HOSPITAL ENCOUNTER (OUTPATIENT)
Dept: SLEEP CENTER | Age: 77
Discharge: HOME | End: 2022-07-29
Payer: MEDICARE

## 2022-07-27 PROCEDURE — G0399 HOME SLEEP TEST/TYPE 3 PORTA: HCPCS

## 2022-07-28 PROCEDURE — G0399 HOME SLEEP TEST/TYPE 3 PORTA: HCPCS

## 2022-07-28 RX ORDER — EMPAGLIFLOZIN 25 MG/1
TABLET, FILM COATED ORAL
Qty: 90 TABLET | Refills: 1 | Status: SHIPPED | OUTPATIENT
Start: 2022-07-28

## 2022-08-03 DIAGNOSIS — G47.33 SLEEP APNEA, OBSTRUCTIVE: Primary | ICD-10-CM

## 2022-08-03 PROCEDURE — 95806 SLEEP STUDY UNATT&RESP EFFT: CPT | Performed by: PSYCHIATRY & NEUROLOGY

## 2022-08-03 NOTE — PROGRESS NOTES
[address] Ishmael brennan  Phone [phone] Fax [phone]     Patient Name: Anant Encarnacion 8/3/2022  : 1945  Age: 69 y.o.   Patient Address: [address] [place] [address]       Patient Phone: [phone] (home)     REFERRAL  Referred to: DME provider of patient's choice  Anant Encarnacion is referred for the following:    DME Equipment HPCPS Code Setting   Auto Adjusting CPAP device with flex or comparable pressure relief per comfort  8cm to 20cm   Heated Humidifier  Patient Choice       Replinishible PAP Supplies, 1 year supply  Item HPCPS Code Frequency   Mask of choice  or  1 per 3 months   Nasal Mask cushion/pillows  or  2 per 30 days   Full Face Mask Interface  1 per 30 days   Headgear  1 per 6 months   Tubing, length of choice  or  1 per 3 months   Water Chamber  1 per 6 months   Chinstrap  1 per 6 months   Disposable Filters  2 per 30 days   Reusable Filters  1 per 6 months     Diagnoses:  Obstructive sleep apnea (G47.33)  Length of Need: Lifetime, 99    Ordering Provider: RENE Monroy: [id number]         Signature:       Date: 8/3/2022      Electronically Signed by Jade Arthur M.D.

## 2022-08-08 DIAGNOSIS — N32.81 OAB (OVERACTIVE BLADDER): ICD-10-CM

## 2022-08-08 NOTE — TELEPHONE ENCOUNTER
Sidney Loraine called to request a refill on her medication.       Last office visit : 6/23/2022   Next office visit : 8/23/2022     Requested Prescriptions     Pending Prescriptions Disp Refills    oxybutynin (DITROPAN-XL) 10 MG extended release tablet [Pharmacy Med Name: OXYBUTYNIN CL ER 10 MG TABLET] 90 tablet 0     Sig: TAKE 1 TABLET BY MOUTH EVERY DAY            [employer]

## 2022-08-10 RX ORDER — OXYBUTYNIN CHLORIDE 10 MG/1
TABLET, EXTENDED RELEASE ORAL
Qty: 90 TABLET | Refills: 0 | Status: SHIPPED | OUTPATIENT
Start: 2022-08-10

## 2022-08-12 NOTE — TELEPHONE ENCOUNTER
Julianna Luevano called to request a refill on her medication.       Last office visit : 6/23/2022   Next office visit : 8/23/2022     Requested Prescriptions     Pending Prescriptions Disp Refills    levothyroxine (SYNTHROID) 150 MCG tablet [Pharmacy Med Name: LEVOTHYROXINE 150 MCG TABLET] 90 tablet 1     Sig: TAKE 1 TABLET BY MOUTH EVERY DAY            Bela Rodrigues
19-Mar-2021 20:56

## 2022-08-16 ENCOUNTER — TELEPHONE (OUTPATIENT)
Dept: PRIMARY CARE CLINIC | Age: 77
End: 2022-08-16

## 2022-08-16 RX ORDER — LEVOTHYROXINE SODIUM 0.15 MG/1
TABLET ORAL
Qty: 90 TABLET | Refills: 1 | Status: SHIPPED | OUTPATIENT
Start: 2022-08-16

## 2022-08-17 ENCOUNTER — TELEPHONE (OUTPATIENT)
Dept: PRIMARY CARE CLINIC | Age: 77
End: 2022-08-17

## 2022-08-22 ENCOUNTER — TELEPHONE (OUTPATIENT)
Dept: PRIMARY CARE CLINIC | Age: 77
End: 2022-08-22

## 2022-08-22 NOTE — TELEPHONE ENCOUNTER
Pt states she has had a reactionn between her legs from an antibiotic from a iv she got when in June she was in the hospital she got a spider bite , she has a rashe that is bleeding between her legs and its bumps and she has used everything otc and only relief is vasaline  she wants an rx cream to help heal its painful

## 2022-08-22 NOTE — TELEPHONE ENCOUNTER
Pt has a spider bite that is not healing from 6/17. Went through antibiotics, then IV antibiotics at the ER. Still not healed, has extensive rash. She has appt with [facility] on Wednesday, but would delia something called in before to [facility] by the [facility].

## 2022-09-09 ENCOUNTER — TELEPHONE (OUTPATIENT)
Dept: NEUROLOGY | Age: 77
End: 2022-09-09

## 2022-09-16 ENCOUNTER — OFFICE VISIT (OUTPATIENT)
Dept: PRIMARY CARE CLINIC | Age: 77
End: 2022-09-16
Payer: MEDICARE

## 2022-09-16 VITALS
DIASTOLIC BLOOD PRESSURE: 60 MMHG | HEIGHT: 61 IN | HEART RATE: 87 BPM | BODY MASS INDEX: 32.47 KG/M2 | SYSTOLIC BLOOD PRESSURE: 118 MMHG | WEIGHT: 172 LBS | OXYGEN SATURATION: 95 %

## 2022-09-16 DIAGNOSIS — R21 RASH: Primary | ICD-10-CM

## 2022-09-16 PROCEDURE — 1036F TOBACCO NON-USER: CPT | Performed by: INTERNAL MEDICINE

## 2022-09-16 PROCEDURE — G8399 PT W/DXA RESULTS DOCUMENT: HCPCS | Performed by: INTERNAL MEDICINE

## 2022-09-16 PROCEDURE — G8427 DOCREV CUR MEDS BY ELIG CLIN: HCPCS | Performed by: INTERNAL MEDICINE

## 2022-09-16 PROCEDURE — 99214 OFFICE O/P EST MOD 30 MIN: CPT | Performed by: INTERNAL MEDICINE

## 2022-09-16 PROCEDURE — G8417 CALC BMI ABV UP PARAM F/U: HCPCS | Performed by: INTERNAL MEDICINE

## 2022-09-16 PROCEDURE — 1090F PRES/ABSN URINE INCON ASSESS: CPT | Performed by: INTERNAL MEDICINE

## 2022-09-16 PROCEDURE — 1123F ACP DISCUSS/DSCN MKR DOCD: CPT | Performed by: INTERNAL MEDICINE

## 2022-09-16 RX ORDER — PREDNISONE 10 MG/1
TABLET ORAL
Qty: 30 TABLET | Refills: 0 | Status: SHIPPED | OUTPATIENT
Start: 2022-09-16

## 2022-09-16 NOTE — PROGRESS NOTES
Julianna Luevano ( 1945) is a 69 y.o. female, Established here for evaluation of the following chief complaint(s). Rash      Patient was encouraged and advised to be compliant with all  medications leads an active lifestyle and promote maintaining a healthy weight, encouraged not to use cigarettes, laboratory results discussed and reviewed with patient's during this visit   ASSESSMENT/PLAN:  Problem List          Musculoskeletal and Integument    Rash - Primary      Uncontrolled, changes made today: Patient was referred to dermatology,, cannot rule out vasculitis reaction from possible vancomycin and cephalosporin, patient does has normal function of her kidneys unlikely to be interstitial nephritis however at this time due to recurrence of her lesions patient was given a prednisone taper and advised to see Dr. Mariel Julien on Monday for further evaluation of her lesion, this is possibly erythrasma cannot be ruled out treatment for erythrasma is detrimental tetracycline if she does not respond to steroid         Relevant Medications    predniSONE (DELTASONE) 10 MG tablet    Other Relevant Orders    Miscellaneous Sendout    External Referral To Dermatology     Patient was to advised to cease applying all kinds of chemicals to her groin and her hand she was advised to keep them moist and using a hypoallergenic lotion especially her hands she was given CeraVe samples for body wash after she has her wash she will clean the area well and apply Desitin to decrease the redness and itching the patient was given a steroid taper and advised to check her fingersticks as this could elevate the steroids could elevate her blood sugar    No flowsheet data found.     PHQ Scores 2022 2021 3/3/2021 2020 2019   PHQ2 Score 0 0 0 0 0   PHQ9 Score 0 0 0 0 0       Results for orders placed or performed in visit on 22   CBC with Auto Differential   Result Value Ref Range    WBC 8.9 4.8 - 10.8 K/uL    RBC 4.79 4.20 - 5.40 M/uL    Hemoglobin 14.2 12.0 - 16.0 g/dL    Hematocrit 45.0 37.0 - 47.0 %    MCV 93.9 81.0 - 99.0 fL    MCH 29.6 27.0 - 31.0 pg    MCHC 31.6 (L) 33.0 - 37.0 g/dL    RDW 13.3 11.5 - 14.5 %    Platelets 820 741 - 036 K/uL    MPV 11.0 9.4 - 12.3 fL    Neutrophils % 52.6 50.0 - 65.0 %    Lymphocytes % 33.4 20.0 - 40.0 %    Monocytes % 8.1 0.0 - 10.0 %    Eosinophils % 3.5 0.0 - 5.0 %    Basophils % 1.6 (H) 0.0 - 1.0 %    Neutrophils Absolute 4.7 1.5 - 7.5 K/uL    Immature Granulocytes # 0.1 K/uL    Lymphocytes Absolute 3.0 1.1 - 4.5 K/uL    Monocytes Absolute 0.70 0.00 - 0.90 K/uL    Eosinophils Absolute 0.30 0.00 - 0.60 K/uL    Basophils Absolute 0.10 0.00 - 0.20 K/uL   Comprehensive Metabolic Panel   Result Value Ref Range    Sodium 141 136 - 145 mmol/L    Potassium 4.2 3.5 - 5.0 mmol/L    Chloride 103 98 - 111 mmol/L    CO2 29 22 - 29 mmol/L    Anion Gap 9 7 - 19 mmol/L    Glucose 80 74 - 109 mg/dL    BUN 15 8 - 23 mg/dL    Creatinine 0.8 0.5 - 0.9 mg/dL    GFR Non-African American >60 >60    GFR African American >59 >59    Calcium 9.8 8.8 - 10.2 mg/dL    Total Protein 6.7 6.6 - 8.7 g/dL    Albumin 4.2 3.5 - 5.2 g/dL    Total Bilirubin 0.3 0.2 - 1.2 mg/dL    Alkaline Phosphatase 67 35 - 104 U/L    ALT 13 5 - 33 U/L    AST 19 5 - 32 U/L   TSH   Result Value Ref Range    TSH 4.430 (H) 0.270 - 4.200 uIU/mL       No follow-ups on file.     HPI  71-year-old female who presents for urgent care visit which lasted for over 60 minutes  Chart review shows that the patient had a spider rash in her left upper extremity which was treated with antibiotics however progressed she was admitted and was given vancomycin there after the patient was discharged home had not had been developing recurrent rash  Initially she did have a rash in her mucosa as patient describes although not visible during this visit and now she says that she has itching in certain areas and she has breakout so she is showing that she has rashes in her right axilla between her breasts and severe in her groin which she has been treating with what was prescribed however has not resolved her lesions        Review of Systems   Constitutional:  Negative for fever. HENT:  Negative for sinus pressure, sinus pain, sore throat and trouble swallowing. Eyes:  Negative for pain, discharge and itching. Respiratory:  Negative for chest tightness and shortness of breath. Cardiovascular:  Negative for palpitations and leg swelling. Gastrointestinal:  Negative for abdominal pain, diarrhea and nausea. Skin:  Positive for color change and rash. Physical Exam  Constitutional:       General: She is not in acute distress. HENT:      Mouth/Throat:      Pharynx: No oropharyngeal exudate. Eyes:      Conjunctiva/sclera: Conjunctivae normal.   Cardiovascular:      Rate and Rhythm: Normal rate and regular rhythm. Pulmonary:      Effort: Pulmonary effort is normal.      Breath sounds: Normal breath sounds. Abdominal:      General: Bowel sounds are normal. There is distension. Palpations: Abdomen is soft. Musculoskeletal:      Cervical back: No rigidity. Lymphadenopathy:      Cervical: Cervical adenopathy present. Skin:     Findings: Rash present. Comments: See photo   Neurological:      Mental Status: She is alert.         over 50% of the total visit time of 30 minutes was spent on counseling and coordination of care of  chart review, treatments and management of rash since Augusr   (Time Documentation Optional [id number])    An electronic signaturewaas used to authenticate this note  -Lidia Rosales MD on 9/18/2022 at 1:00 PM

## 2022-09-18 ASSESSMENT — ENCOUNTER SYMPTOMS
SINUS PAIN: 0
CHEST TIGHTNESS: 0
COLOR CHANGE: 1
SINUS PRESSURE: 0
DIARRHEA: 0
ABDOMINAL PAIN: 0
SHORTNESS OF BREATH: 0
EYE ITCHING: 0
EYE PAIN: 0
SORE THROAT: 0
EYE DISCHARGE: 0
TROUBLE SWALLOWING: 0
NAUSEA: 0

## 2022-09-18 NOTE — ASSESSMENT & PLAN NOTE
Uncontrolled, changes made today: Patient was referred to dermatology,, cannot rule out vasculitis reaction from possible vancomycin and cephalosporin, patient does has normal function of her kidneys unlikely to be interstitial nephritis however at this time due to recurrence of her lesions patient was given a prednisone taper and advised to see Dr. Juan Miguel Amador on Monday for further evaluation of her lesion, this is possibly erythrasma cannot be ruled out treatment for erythrasma is detrimental tetracycline if she does not respond to steroid

## 2022-09-23 DIAGNOSIS — M85.80 OSTEOPENIA, UNSPECIFIED LOCATION: ICD-10-CM

## 2022-09-23 NOTE — TELEPHONE ENCOUNTER
Wing Galeano called to request a refill on her medication.       Last office visit : 9/16/2022   Next office visit : 9/27/2022     Requested Prescriptions     Pending Prescriptions Disp Refills    alendronate (FOSAMAX) 70 MG tablet [Pharmacy Med Name: ALENDRONATE SODIUM 70 MG TAB] 12 tablet 3     Sig: TAKE 1 TABLET BY MOUTH ONE TIME PER WEEK            Alli Lopez MA

## 2022-09-26 RX ORDER — ALENDRONATE SODIUM 70 MG/1
TABLET ORAL
Qty: 12 TABLET | Refills: 3 | Status: SHIPPED | OUTPATIENT
Start: 2022-09-26

## 2022-09-27 ENCOUNTER — OFFICE VISIT (OUTPATIENT)
Dept: PRIMARY CARE CLINIC | Age: 77
End: 2022-09-27
Payer: MEDICARE

## 2022-09-27 VITALS
SYSTOLIC BLOOD PRESSURE: 118 MMHG | HEART RATE: 75 BPM | BODY MASS INDEX: 32.44 KG/M2 | HEIGHT: 61 IN | DIASTOLIC BLOOD PRESSURE: 62 MMHG | OXYGEN SATURATION: 96 % | TEMPERATURE: 96.9 F | WEIGHT: 171.8 LBS

## 2022-09-27 DIAGNOSIS — R21 RASH: Primary | ICD-10-CM

## 2022-09-27 PROCEDURE — G8427 DOCREV CUR MEDS BY ELIG CLIN: HCPCS | Performed by: INTERNAL MEDICINE

## 2022-09-27 PROCEDURE — G8417 CALC BMI ABV UP PARAM F/U: HCPCS | Performed by: INTERNAL MEDICINE

## 2022-09-27 PROCEDURE — 1090F PRES/ABSN URINE INCON ASSESS: CPT | Performed by: INTERNAL MEDICINE

## 2022-09-27 PROCEDURE — G8399 PT W/DXA RESULTS DOCUMENT: HCPCS | Performed by: INTERNAL MEDICINE

## 2022-09-27 PROCEDURE — 1123F ACP DISCUSS/DSCN MKR DOCD: CPT | Performed by: INTERNAL MEDICINE

## 2022-09-27 PROCEDURE — 1036F TOBACCO NON-USER: CPT | Performed by: INTERNAL MEDICINE

## 2022-09-27 PROCEDURE — 99213 OFFICE O/P EST LOW 20 MIN: CPT | Performed by: INTERNAL MEDICINE

## 2022-09-27 RX ORDER — FLUCONAZOLE 150 MG/1
150 TABLET ORAL DAILY
Qty: 10 TABLET | Refills: 0 | Status: SHIPPED | OUTPATIENT
Start: 2022-09-27

## 2022-09-27 RX ORDER — DOXYCYCLINE HYCLATE 100 MG
100 TABLET ORAL 2 TIMES DAILY
Qty: 20 TABLET | Refills: 0 | Status: SHIPPED | OUTPATIENT
Start: 2022-09-27 | End: 2022-10-07

## 2022-09-27 NOTE — ASSESSMENT & PLAN NOTE
Patient's lesions are less itchy continues to be erythematous but I think the skin tags have been there for some time patient will continue to use Desitin cream today is appearing like she has some satellite lesions and therefore will be treated with both bacterial and antifungal

## 2022-09-27 NOTE — PROGRESS NOTES
Caro Del Cid ( 1945) is a 69 y.o. female, Established  here for evaluation of the following chief complaint(s). Follow-up and Rash (Taking prescribed medications for rash. Rash is improving.)      Patient was encouraged and advised to be compliant with all  medications leads an active lifestyle and promote maintaining a healthy weight, encouraged not to use cigarettes, laboratory results discussed and reviewed with patient's during this visit   ASSESSMENT/PLAN:  Problem List          Musculoskeletal and Integument    Rash - Primary    Relevant Medications    predniSONE (DELTASONE) 10 MG tablet    fluconazole (DIFLUCAN) 150 MG tablet    doxycycline hyclate (VIBRA-TABS) 100 MG tablet         No flowsheet data found.     PHQ Scores 2022 2021 3/3/2021 2020 2019   PHQ2 Score 0 0 0 0 0   PHQ9 Score 0 0 0 0 0       Results for orders placed or performed in visit on 22   CBC with Auto Differential   Result Value Ref Range    WBC 8.9 4.8 - 10.8 K/uL    RBC 4.79 4.20 - 5.40 M/uL    Hemoglobin 14.2 12.0 - 16.0 g/dL    Hematocrit 45.0 37.0 - 47.0 %    MCV 93.9 81.0 - 99.0 fL    MCH 29.6 27.0 - 31.0 pg    MCHC 31.6 (L) 33.0 - 37.0 g/dL    RDW 13.3 11.5 - 14.5 %    Platelets 636 924 - 896 K/uL    MPV 11.0 9.4 - 12.3 fL    Neutrophils % 52.6 50.0 - 65.0 %    Lymphocytes % 33.4 20.0 - 40.0 %    Monocytes % 8.1 0.0 - 10.0 %    Eosinophils % 3.5 0.0 - 5.0 %    Basophils % 1.6 (H) 0.0 - 1.0 %    Neutrophils Absolute 4.7 1.5 - 7.5 K/uL    Immature Granulocytes # 0.1 K/uL    Lymphocytes Absolute 3.0 1.1 - 4.5 K/uL    Monocytes Absolute 0.70 0.00 - 0.90 K/uL    Eosinophils Absolute 0.30 0.00 - 0.60 K/uL    Basophils Absolute 0.10 0.00 - 0.20 K/uL   Comprehensive Metabolic Panel   Result Value Ref Range    Sodium 141 136 - 145 mmol/L    Potassium 4.2 3.5 - 5.0 mmol/L    Chloride 103 98 - 111 mmol/L    CO2 29 22 - 29 mmol/L    Anion Gap 9 7 - 19 mmol/L    Glucose 80 74 - 109 mg/dL    BUN 15 8 - 23 mg/dL    Creatinine 0.8 0.5 - 0.9 mg/dL    GFR Non-African American >60 >60    GFR African American >59 >59    Calcium 9.8 8.8 - 10.2 mg/dL    Total Protein 6.7 6.6 - 8.7 g/dL    Albumin 4.2 3.5 - 5.2 g/dL    Total Bilirubin 0.3 0.2 - 1.2 mg/dL    Alkaline Phosphatase 67 35 - 104 U/L    ALT 13 5 - 33 U/L    AST 19 5 - 32 U/L   TSH   Result Value Ref Range    TSH 4.430 (H) 0.270 - 4.200 uIU/mL       No follow-ups on file. HPI  Follow-up visit for rash induced most likely due to medication she did have mucosal involvement and skin most likely vasculitis completed her steroids doing well however there is now lesions that seem to be having satellite lesions with redness she denies any itching so that is good no fever no chills she has an appointment with dermatology  Review of Systems   Skin:  Positive for rash. Physical Exam  Skin:     General: Skin is warm. Findings: Erythema, lesion and rash present.       Comments: Foul-smelling sent is no longer present       (Time Documentation Optional [id number])    An electronic signaturewaas used to authenticate this note  -Meg Ramirez MD on 9/27/2022 at 6:07 PM

## 2022-10-07 ENCOUNTER — TELEPHONE (OUTPATIENT)
Dept: OTOLARYNGOLOGY | Facility: CLINIC | Age: 77
End: 2022-10-07

## 2022-10-07 NOTE — TELEPHONE ENCOUNTER
The [facility] received a fax that requires your attention. The document has been indexed to the patient’s chart for your review.      Reason for sending: NEEDS PROVIDER SIGNATURE    Documents Description: RX- DIABETIC SUPPLY REQUEST    Name of Sender: [employer]    Date Indexed:10/07/2022

## 2022-10-10 ENCOUNTER — OFFICE VISIT (OUTPATIENT)
Dept: FAMILY MEDICINE CLINIC | Facility: CLINIC | Age: 77
End: 2022-10-10

## 2022-10-10 ENCOUNTER — HOSPITAL ENCOUNTER (OUTPATIENT)
Dept: GENERAL RADIOLOGY | Facility: HOSPITAL | Age: 77
Discharge: HOME | End: 2022-10-10
Admitting: NURSE PRACTITIONER

## 2022-10-10 VITALS
HEIGHT: 61 IN | SYSTOLIC BLOOD PRESSURE: 129 MMHG | HEART RATE: 76 BPM | WEIGHT: 164 LBS | RESPIRATION RATE: 18 BRPM | DIASTOLIC BLOOD PRESSURE: 77 MMHG | OXYGEN SATURATION: 96 % | BODY MASS INDEX: 30.96 KG/M2

## 2022-10-10 DIAGNOSIS — M41.9 SCOLIOSIS OF THORACOLUMBAR SPINE, UNSPECIFIED SCOLIOSIS TYPE: ICD-10-CM

## 2022-10-10 DIAGNOSIS — M25.552 LEFT HIP PAIN: ICD-10-CM

## 2022-10-10 DIAGNOSIS — M54.42 ACUTE LEFT-SIDED LOW BACK PAIN WITH LEFT-SIDED SCIATICA: ICD-10-CM

## 2022-10-10 DIAGNOSIS — E66.9 CLASS 1 OBESITY WITH BODY MASS INDEX (BMI) OF 30.0 TO 30.9 IN ADULT, UNSPECIFIED OBESITY TYPE, UNSPECIFIED WHETHER SERIOUS COMORBIDITY PRESENT: ICD-10-CM

## 2022-10-10 DIAGNOSIS — M51.36 DEGENERATIVE DISC DISEASE, LUMBAR: Primary | ICD-10-CM

## 2022-10-10 PROCEDURE — 99213 OFFICE O/P EST LOW 20 MIN: CPT | Performed by: NURSE PRACTITIONER

## 2022-10-10 PROCEDURE — 72100 X-RAY EXAM L-S SPINE 2/3 VWS: CPT

## 2022-10-10 PROCEDURE — 73502 X-RAY EXAM HIP UNI 2-3 VIEWS: CPT

## 2022-10-10 RX ORDER — ALENDRONATE SODIUM 70 MG/1
70 TABLET ORAL
COMMUNITY
Start: 2022-09-26

## 2022-10-10 RX ORDER — MELATONIN
1000
COMMUNITY

## 2022-10-10 RX ORDER — EMPAGLIFLOZIN 25 MG/1
25 TABLET, FILM COATED ORAL DAILY
COMMUNITY
Start: 2022-07-28

## 2022-10-10 RX ORDER — OXYBUTYNIN CHLORIDE 10 MG/1
10 TABLET, EXTENDED RELEASE ORAL DAILY
COMMUNITY
Start: 2022-08-10

## 2022-10-10 RX ORDER — CYCLOBENZAPRINE HCL 10 MG
TABLET ORAL
Qty: 20 TABLET | Refills: 0 | Status: SHIPPED | OUTPATIENT
Start: 2022-10-10

## 2022-10-10 NOTE — PROGRESS NOTES
"Chief Complaint  Hip Pain (Patient states that it feels like a stabbing pain, left), Back Pain, Leg Pain (Left leg, feels like it is burning ), and Sinusitis    Subjective        Leah Nieto presents to [facility]  History of Present Illness  Hip Pain Patient states that it feels like a stabbing pain, left     Back Pain      Leg Pain Left leg, feels like it is burning      Sinusitis        Hip Pain     Back Pain  Associated symptoms include leg pain.   Leg Pain     Sinusitis        Objective   Vital Signs:  /77 (BP Location: Right arm, Patient Position: Sitting, Cuff Size: Adult)   Pulse 76   Resp 18   Ht 154.9 cm (61\")   Wt 74.4 kg (164 lb)   SpO2 96%   BMI 30.99 kg/m²   Estimated body mass index is 30.99 kg/m² as calculated from the following:    Height as of this encounter: 154.9 cm (61\").    Weight as of this encounter: 74.4 kg (164 lb).    BMI is >= 30 and <35. (Class 1 Obesity). The following options were offered after discussion;: weight loss educational material (shared in after visit summary)      Physical Exam  Constitutional:       Appearance: Normal appearance. She is well-developed. She is obese.   HENT:      Head: Normocephalic and atraumatic.      Right Ear: External ear normal.      Left Ear: External ear normal.      Nose: Nose normal. No nasal tenderness or congestion.      Mouth/Throat:      Lips: Pink. No lesions.      Mouth: Mucous membranes are moist. No oral lesions.      Dentition: Normal dentition.      Pharynx: Oropharynx is clear. No pharyngeal swelling, oropharyngeal exudate or posterior oropharyngeal erythema.   Eyes:      General: Lids are normal. Vision grossly intact. No scleral icterus.        Right eye: No discharge.         Left eye: No discharge.      Extraocular Movements: Extraocular movements intact.      Conjunctiva/sclera: Conjunctivae normal.      Right eye: Right conjunctiva is not injected.      Left eye: Left conjunctiva is " not injected.      Pupils: Pupils are equal, round, and reactive to light.   Cardiovascular:      Rate and Rhythm: Normal rate and regular rhythm.      Heart sounds: Normal heart sounds. No murmur heard.    No gallop.   Pulmonary:      Effort: Pulmonary effort is normal.      Breath sounds: Normal breath sounds and air entry. No wheezing, rhonchi or rales.   Musculoskeletal:         General: No tenderness or deformity. Normal range of motion.      Cervical back: Full passive range of motion without pain, normal range of motion and neck supple.      Right lower leg: No edema.      Left lower leg: No edema.   Skin:     General: Skin is warm and dry.      Coloration: Skin is not jaundiced.      Findings: No rash.   Neurological:      Mental Status: She is alert and oriented to person, place, and time.      Cranial Nerves: Cranial nerves are intact.      Sensory: Sensation is intact.      Motor: Motor function is intact.      Coordination: Coordination is intact.      Gait: Gait is intact.   Psychiatric:         Attention and Perception: Attention normal.         Mood and Affect: Mood and affect normal.         Behavior: Behavior is not hyperactive. Behavior is cooperative.         Thought Content: Thought content normal.         Judgment: Judgment normal.        Result Review :  The following data was reviewed by: KRYSTINA Mancera on 10/10/2022:  XR Hip With or Without Pelvis 2 - 3 View Left (10/10/2022 13:07)  XR Spine Lumbar 2 or 3 View (10/10/2022 13:07)           Assessment and Plan   Diagnoses and all orders for this visit:    1. Degenerative disc disease, lumbar (Primary)  -     cyclobenzaprine (FLEXERIL) 10 MG tablet; Take 1/2 to 1 tab po Qhs as needed.  Dispense: 20 tablet; Refill: 0  -     Ambulatory Referral to Orthopedic Surgery    2. Scoliosis of thoracolumbar spine, unspecified scoliosis type    3. Left hip pain  -     XR Hip With or Without Pelvis 2 - 3 View Left; Future  -     Ambulatory Referral  to Orthopedic Surgery    4. Acute left-sided low back pain with left-sided sciatica  -     XR Spine Lumbar 2 or 3 View; Future    5. Class 1 obesity with body mass index (BMI) of 30.0 to 30.9 in adult, unspecified obesity type, unspecified whether serious comorbidity present        Pt here today with c/o left hip pain x 2 weeks. She describes this pain as sharp and stabbing to her left hip that does radiate down her left leg. She also states she experiences numbness to her left leg and knee at times as well. She does admit to having some low back pain that is dull and achy.  Denies any known injury or fall. States she has had hip pain like this once before in the past.     Plan:    1. 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   2. Referral placed to Dr. Brandt.  3. Flexeril sent for prn use. Also recommended tylenol arthritis.   4. F/u as needed.         Follow Up   Return if symptoms worsen or fail to improve.  Patient was given instructions and counseling regarding her condition or for health maintenance advice. Please see specific information pulled into the AVS if appropriate.

## 2022-10-12 ENCOUNTER — PATIENT ROUNDING (BHMG ONLY) (OUTPATIENT)
Dept: FAMILY MEDICINE CLINIC | Facility: CLINIC | Age: 77
End: 2022-10-12

## 2022-10-12 NOTE — PROGRESS NOTES
October 12, 2022    Hello, may I speak with Leah Nieto?    My name is Gissel  Called and got pt vm, so I left message welcoming her to our office and left our phone number for her to call back      I am  with [facility]  [facility]  [address] [place]  [place] [address]  [phone].    Before we get started may I verify your date of birth? 1945    I am calling to officially welcome you to our practice and ask about your recent visit. Is this a good time to talk?     Tell me about your visit with us. What things went well?         We're always looking for ways to make our patients' experiences even better. Do you have recommendations on ways we may improve?      Overall were you satisfied with your first visit to our practice?        I appreciate you taking the time to speak with me today. Is there anything else I can do for you?     Thank you, and have a great day.

## 2022-10-19 ENCOUNTER — TELEPHONE (OUTPATIENT)
Dept: OTOLARYNGOLOGY | Facility: CLINIC | Age: 77
End: 2022-10-19

## 2022-10-19 NOTE — TELEPHONE ENCOUNTER
The [facility] received a fax that requires your attention. The document has been indexed to the patient’s chart for your review.      Reason for sending: REQUIRED MEDICARE FORM IN ORDER TO FULFILL DIABETIC SUPPLIES    Documents Description: MEDICARE REQUIRED DOCUMENTATION    Name of Sender: [employer]    Date Indexed: 10/17/2022    Notes (if needed):

## 2022-11-06 DIAGNOSIS — N32.81 OAB (OVERACTIVE BLADDER): ICD-10-CM

## 2022-11-06 RX ORDER — OXYBUTYNIN CHLORIDE 10 MG/1
TABLET, EXTENDED RELEASE ORAL
Qty: 90 TABLET | Refills: 1 | Status: SHIPPED | OUTPATIENT
Start: 2022-11-06

## 2022-11-28 ENCOUNTER — TELEPHONE (OUTPATIENT)
Dept: OTOLARYNGOLOGY | Facility: CLINIC | Age: 77
End: 2022-11-28

## 2022-11-28 NOTE — TELEPHONE ENCOUNTER
The [facility] received a fax that requires your attention. The document has been indexed to the patient’s chart for your review.      Reason for sending: EXT MED REC NOTIF DIABETIC SUPPLIES    Documents Description: [employer] DIABETIC SUPPLIES RX    Name of Sender: [employer]     Date Indexed: 11.22.22    Notes (if needed):

## 2022-11-30 ENCOUNTER — TELEPHONE (OUTPATIENT)
Dept: OTOLARYNGOLOGY | Facility: CLINIC | Age: 77
End: 2022-11-30

## 2022-11-30 DIAGNOSIS — E11.8 TYPE 2 DIABETES MELLITUS WITH COMPLICATION, WITHOUT LONG-TERM CURRENT USE OF INSULIN (HCC): ICD-10-CM

## 2022-11-30 DIAGNOSIS — I10 ESSENTIAL HYPERTENSION: ICD-10-CM

## 2022-11-30 DIAGNOSIS — Z13.31 POSITIVE DEPRESSION SCREENING: ICD-10-CM

## 2022-11-30 NOTE — TELEPHONE ENCOUNTER
The [facility] received a fax that requires your attention. The document has been indexed to the patient’s chart for your review.      Reason for sending: EXTERNAL MEDICAL RECORD NOTIFICATION    Documents Description: DIABETIC SUPPLY RX    Name of Sender: [employer]     Date Indexed: 11.25.22     Notes (if needed):

## 2022-12-02 NOTE — TELEPHONE ENCOUNTER
Tai Cutler called requesting a refill of the below medication which has been pended for you:     Requested Prescriptions     Pending Prescriptions Disp Refills    glimepiride (AMARYL) 4 MG tablet [Pharmacy Med Name: GLIMEPIRIDE 4 MG TABLET] 180 tablet 3     Sig: TAKE 1 TABLET BY MOUTH TWICE A DAY    lisinopril (PRINIVIL;ZESTRIL) 10 MG tablet [Pharmacy Med Name: LISINOPRIL 10 MG TABLET] 90 tablet 1     Sig: TAKE 1 TABLET BY MOUTH EVERY DAY    venlafaxine (EFFEXOR XR) 150 MG extended release capsule [Pharmacy Med Name: VENLAFAXINE HCL  MG CAP] 90 capsule 1     Sig: TAKE 1 CAPSULE BY MOUTH EVERY DAY       Last Appointment Date: 9/27/2022  Next Appointment Date: 12/12/2022    Allergies   Allergen Reactions    Bee Venom Shortness Of Breath and Swelling    Adhesive Tape Rash

## 2022-12-06 ENCOUNTER — TELEPHONE (OUTPATIENT)
Dept: OTOLARYNGOLOGY | Facility: CLINIC | Age: 77
End: 2022-12-06

## 2022-12-06 RX ORDER — VENLAFAXINE HYDROCHLORIDE 150 MG/1
CAPSULE, EXTENDED RELEASE ORAL
Qty: 30 CAPSULE | Refills: 0 | Status: SHIPPED | OUTPATIENT
Start: 2022-12-06

## 2022-12-06 RX ORDER — GLIMEPIRIDE 4 MG/1
TABLET ORAL
Qty: 60 TABLET | Refills: 0 | Status: SHIPPED | OUTPATIENT
Start: 2022-12-06

## 2022-12-06 RX ORDER — LISINOPRIL 10 MG/1
TABLET ORAL
Qty: 30 TABLET | Refills: 0 | Status: SHIPPED | OUTPATIENT
Start: 2022-12-06

## 2022-12-06 NOTE — TELEPHONE ENCOUNTER
The [facility] received a fax that requires your attention. The document has been indexed to the patient’s chart for your review.      Reason for sending: REQUEST FOR SIGNATURE    Documents Description: DIABETICS SUPPLY REQUEST    Name of Sender: [employer]    Date Indexed: 12/02/2022    Notes (if needed):

## 2022-12-30 DIAGNOSIS — E11.8 TYPE 2 DIABETES MELLITUS WITH COMPLICATION, WITHOUT LONG-TERM CURRENT USE OF INSULIN (HCC): ICD-10-CM

## 2022-12-30 DIAGNOSIS — I10 ESSENTIAL HYPERTENSION: ICD-10-CM

## 2022-12-30 RX ORDER — GLIMEPIRIDE 4 MG/1
TABLET ORAL
Qty: 60 TABLET | Refills: 0 | Status: SHIPPED | OUTPATIENT
Start: 2022-12-30

## 2022-12-30 RX ORDER — LISINOPRIL 10 MG/1
TABLET ORAL
Qty: 30 TABLET | Refills: 0 | Status: SHIPPED | OUTPATIENT
Start: 2022-12-30

## 2023-01-03 DIAGNOSIS — Z13.31 POSITIVE DEPRESSION SCREENING: ICD-10-CM

## 2023-01-03 RX ORDER — VENLAFAXINE HYDROCHLORIDE 150 MG/1
CAPSULE, EXTENDED RELEASE ORAL
Qty: 30 CAPSULE | Refills: 0 | OUTPATIENT
Start: 2023-01-03

## 2023-02-03 DIAGNOSIS — I10 ESSENTIAL HYPERTENSION: ICD-10-CM

## 2023-02-03 DIAGNOSIS — E11.8 TYPE 2 DIABETES MELLITUS WITH COMPLICATION, WITHOUT LONG-TERM CURRENT USE OF INSULIN (HCC): ICD-10-CM

## 2023-02-03 RX ORDER — LISINOPRIL 10 MG/1
TABLET ORAL
Qty: 30 TABLET | Refills: 0 | OUTPATIENT
Start: 2023-02-03

## 2023-02-03 RX ORDER — GLIMEPIRIDE 4 MG/1
TABLET ORAL
Qty: 60 TABLET | Refills: 0 | OUTPATIENT
Start: 2023-02-03

## 2023-02-09 NOTE — TELEPHONE ENCOUNTER
Heidi Johansen called to request a refill on her medication.      Last office visit : 9/27/2022   Next office visit : Visit date not found     Requested Prescriptions     Pending Prescriptions Disp Refills    levothyroxine (SYNTHROID) 150 MCG tablet [Pharmacy Med Name: LEVOTHYROXINE 150 MCG TABLET] 90 tablet 1     Sig: TAKE 1 TABLET BY MOUTH EVERY DAY            Kaya Hamilton LPN

## 2023-02-13 RX ORDER — LEVOTHYROXINE SODIUM 0.15 MG/1
TABLET ORAL
Qty: 90 TABLET | Refills: 1 | Status: SHIPPED | OUTPATIENT
Start: 2023-02-13

## 2023-03-02 ENCOUNTER — OFFICE VISIT (OUTPATIENT)
Dept: PRIMARY CARE CLINIC | Age: 78
End: 2023-03-02
Payer: MEDICARE

## 2023-03-02 VITALS
DIASTOLIC BLOOD PRESSURE: 64 MMHG | TEMPERATURE: 97.7 F | OXYGEN SATURATION: 94 % | WEIGHT: 168 LBS | BODY MASS INDEX: 31.72 KG/M2 | HEIGHT: 61 IN | SYSTOLIC BLOOD PRESSURE: 116 MMHG | HEART RATE: 118 BPM

## 2023-03-02 DIAGNOSIS — I10 ESSENTIAL HYPERTENSION: ICD-10-CM

## 2023-03-02 DIAGNOSIS — B37.31 VAGINAL CANDIDIASIS: ICD-10-CM

## 2023-03-02 DIAGNOSIS — E11.8 TYPE 2 DIABETES MELLITUS WITH COMPLICATION, WITHOUT LONG-TERM CURRENT USE OF INSULIN (HCC): Primary | ICD-10-CM

## 2023-03-02 DIAGNOSIS — E11.9 TYPE 2 DIABETES MELLITUS WITHOUT COMPLICATION, WITHOUT LONG-TERM CURRENT USE OF INSULIN (HCC): ICD-10-CM

## 2023-03-02 PROCEDURE — 1036F TOBACCO NON-USER: CPT | Performed by: NURSE PRACTITIONER

## 2023-03-02 PROCEDURE — 3078F DIAST BP <80 MM HG: CPT | Performed by: NURSE PRACTITIONER

## 2023-03-02 PROCEDURE — G8399 PT W/DXA RESULTS DOCUMENT: HCPCS | Performed by: NURSE PRACTITIONER

## 2023-03-02 PROCEDURE — G8417 CALC BMI ABV UP PARAM F/U: HCPCS | Performed by: NURSE PRACTITIONER

## 2023-03-02 PROCEDURE — 1123F ACP DISCUSS/DSCN MKR DOCD: CPT | Performed by: NURSE PRACTITIONER

## 2023-03-02 PROCEDURE — 99215 OFFICE O/P EST HI 40 MIN: CPT | Performed by: NURSE PRACTITIONER

## 2023-03-02 PROCEDURE — 1090F PRES/ABSN URINE INCON ASSESS: CPT | Performed by: NURSE PRACTITIONER

## 2023-03-02 PROCEDURE — G8484 FLU IMMUNIZE NO ADMIN: HCPCS | Performed by: NURSE PRACTITIONER

## 2023-03-02 PROCEDURE — 3074F SYST BP LT 130 MM HG: CPT | Performed by: NURSE PRACTITIONER

## 2023-03-02 PROCEDURE — G8427 DOCREV CUR MEDS BY ELIG CLIN: HCPCS | Performed by: NURSE PRACTITIONER

## 2023-03-02 RX ORDER — FLUCONAZOLE 100 MG/1
100 TABLET ORAL DAILY
Qty: 7 TABLET | Refills: 0 | Status: SHIPPED | OUTPATIENT
Start: 2023-03-02 | End: 2023-03-09

## 2023-03-02 SDOH — ECONOMIC STABILITY: HOUSING INSECURITY
IN THE LAST 12 MONTHS, WAS THERE A TIME WHEN YOU DID NOT HAVE A STEADY PLACE TO SLEEP OR SLEPT IN A SHELTER (INCLUDING NOW)?: NO

## 2023-03-02 SDOH — ECONOMIC STABILITY: FOOD INSECURITY: WITHIN THE PAST 12 MONTHS, THE FOOD YOU BOUGHT JUST DIDN'T LAST AND YOU DIDN'T HAVE MONEY TO GET MORE.: NEVER TRUE

## 2023-03-02 SDOH — ECONOMIC STABILITY: INCOME INSECURITY: HOW HARD IS IT FOR YOU TO PAY FOR THE VERY BASICS LIKE FOOD, HOUSING, MEDICAL CARE, AND HEATING?: NOT VERY HARD

## 2023-03-02 SDOH — ECONOMIC STABILITY: FOOD INSECURITY: WITHIN THE PAST 12 MONTHS, YOU WORRIED THAT YOUR FOOD WOULD RUN OUT BEFORE YOU GOT MONEY TO BUY MORE.: NEVER TRUE

## 2023-03-02 ASSESSMENT — PATIENT HEALTH QUESTIONNAIRE - PHQ9
1. LITTLE INTEREST OR PLEASURE IN DOING THINGS: 0
SUM OF ALL RESPONSES TO PHQ QUESTIONS 1-9: 0
SUM OF ALL RESPONSES TO PHQ QUESTIONS 1-9: 0
2. FEELING DOWN, DEPRESSED OR HOPELESS: 0
SUM OF ALL RESPONSES TO PHQ QUESTIONS 1-9: 0
SUM OF ALL RESPONSES TO PHQ QUESTIONS 1-9: 0
SUM OF ALL RESPONSES TO PHQ9 QUESTIONS 1 & 2: 0

## 2023-03-02 ASSESSMENT — ENCOUNTER SYMPTOMS
RHINORRHEA: 0
NAUSEA: 0
COLOR CHANGE: 0
VOMITING: 0
COUGH: 0
BACK PAIN: 0
VOICE CHANGE: 0
SHORTNESS OF BREATH: 0
PHOTOPHOBIA: 0

## 2023-03-02 NOTE — PROGRESS NOTES
[address] [facility]  [id number] [place] [address]  [address] [address]  Dept: [phone]  Dept Fax: [phone]  Loc: [phone]    Michael Carty is a 69 y.o. female who presents today for her medical conditions/complaints as noted below. Michael Carty is c/o of Follow-up (Had a spider bite in June, that caused swelling and rash reaction all over body./Then had sciatic nerve pain and issues following )        HPI:     HPI   Chief Complaint   Patient presents with    Follow-up     Had a spider bite in June, that caused swelling and rash reaction all over body. Then had sciatic nerve pain and issues following      Patient presents today for diabetes follow-up. She is due for fasting labs. She is not checking blood sugars and complains of increased thirst. She has been drinking a lot of chocolate milk. She had rash last year after she was hospitalized for spider bite; this persisted for several months. She is seeing dermatology for this now. She continues to have dry skin. She also has had a bad episode of back pain/sciatica. She was seen at [facility] urgent care. She     She complains of recurrent yeast infections over the last several weeks. She has used topical creams that seem to help but it does return after a week or two. She complains of vaginal pain/burning. She has JOSE; she recently got a new cpap machine and states this seems to make her sleep better at night.     Past Medical History:   Diagnosis Date    CAD (coronary artery disease)     Carotid artery disease (HCC)     CPAP (continuous positive airway pressure) dependence     11cm    Depression     Diabetes (HCC)     Fibromyalgia     Hypothyroidism     JOSE (obstructive sleep apnea)     AHI:  27.3 per PSG, 2011/CPAP    PLMD (periodic limb movement disorder)     RLS (restless legs syndrome)       Past Surgical History:   Procedure Laterality Date    COLONOSCOPY  12/04/2014    Dr Morris Purchase- no polyps COLONOSCOPY  07/25/2019    Dr David Morales, 5 yr recall    COLONOSCOPY N/A 7/25/2019    COLORECTAL CANCER SCREENING, NOT HIGH RISK performed by Kenya Moses MD at [address]      teeth extractions    PTCA      with stents    TUBAL LIGATION         Vitals 3/2/2023 9/27/2022 9/16/2022 6/23/2022 6/18/2022 6/57/3792   SYSTOLIC 277 409 689 909 644 95   DIASTOLIC 64 62 60 58 76 53   Pulse 118 75 87 66 60 74   Temp 97.7 96.9 - 97.6 96.4 96.8   Resp - - - - 14 14   SpO2 94 96 95 96 - 92   Weight 168 lb 171 lb 12.8 oz 172 lb 169 lb 9.6 oz - -   Height 5' 1\" 5' 1\" 5' 1\" 5' 1\" - -   Body mass index 31.74 kg/m2 32.46 kg/m2 32.5 kg/m2 32.04 kg/m2 - -   Some recent data might be hidden       Family History   Problem Relation Age of Onset    Colon Cancer Neg Hx     Colon Polyps Neg Hx     Esophageal Cancer Neg Hx     Liver Cancer Neg Hx     Liver Disease Neg Hx     Stomach Cancer Neg Hx     Rectal Cancer Neg Hx        Social History     Tobacco Use    Smoking status: Never    Smokeless tobacco: Never   Substance Use Topics    Alcohol use: No      Current Outpatient Medications on File Prior to Visit   Medication Sig Dispense Refill    levothyroxine (SYNTHROID) 150 MCG tablet TAKE 1 TABLET BY MOUTH EVERY DAY 90 tablet 1    empagliflozin (JARDIANCE) 25 MG tablet Take one tablet daily. NO FURTHER REFILLS UNTIL SEEN IN OFFICE. 30 tablet 0    lisinopril (PRINIVIL;ZESTRIL) 10 MG tablet TAKE 1 TABLET BY MOUTH EVERY DAY. MUST KEEP APPOINTMENT FOR FUTURE REFILLS. 30 tablet 0    glimepiride (AMARYL) 4 MG tablet TAKE 1 TABLET BY MOUTH TWICE A DAY. MUST KEEP APPOINTMENT FOR FUTURE REFILLS. 60 tablet 0    venlafaxine (EFFEXOR XR) 150 MG extended release capsule TAKE 1 CAPSULE BY MOUTH EVERY DAY. MUST KEEP APPOINTMENT FOR FUTURE REFILLS.  30 capsule 0    metFORMIN (GLUCOPHAGE) 1000 MG tablet TAKE 1 TABLET BY MOUTH TWICE A DAY WITH MEALS 180 tablet 1    oxybutynin (DITROPAN-XL) 10 MG extended release tablet TAKE 1 TABLET BY MOUTH EVERY DAY 90 tablet 1    alendronate (FOSAMAX) 70 MG tablet TAKE 1 TABLET BY MOUTH ONE TIME PER WEEK 12 tablet 3    predniSONE (DELTASONE) 10 MG tablet 4 Tabs daily for daily for 3 days, 3 tabs daily for 3 days then 2 tab daily for 3 days then 1 tab daily for 3 days then stop 30 tablet 0    nitroGLYCERIN (NITROSTAT) 0.3 MG SL tablet Place 1 tablet under the tongue every 5 minutes as needed for Chest pain up to max of 3 total doses. If no relief after 1 dose, call 911. 30 tablet 3    clopidogrel (PLAVIX) 75 MG tablet TAKE 1 TABLET BY MOUTH EVERY DAY 30 tablet 5    lovastatin (MEVACOR) 40 MG tablet TAKE 1 TABLET BY MOUTH AT BEDTIME 90 tablet 3    aspirin 81 MG tablet Take 81 mg by mouth daily      vitamin D (CHOLECALCIFEROL) 1000 UNIT TABS tablet Take 1,000 Units by mouth daily      vitamin B-12 (CYANOCOBALAMIN) 1000 MCG tablet Take 5,000 mcg by mouth daily        No current facility-administered medications on file prior to visit. Allergies   Allergen Reactions    Bee Venom Shortness Of Breath and Swelling    Adhesive Tape Rash       Health Maintenance   Topic Date Due    COVID-19 Vaccine (1) Never done    Shingles vaccine (1 of 2) Never done    Flu vaccine (1) 08/01/2022    Annual Wellness Visit (AWV)  11/24/2022    Lipids  02/16/2023    Depression Screen  03/02/2024    DTaP/Tdap/Td vaccine (2 - Td or Tdap) 06/09/2025    DEXA (modify frequency per FRAX score)  Completed    Pneumococcal 65+ years Vaccine  Completed    Hepatitis C screen  Completed    Hepatitis A vaccine  Aged Out    Hib vaccine  Aged Out    Meningococcal (ACWY) vaccine  Aged Out       Subjective:      Review of Systems   Constitutional:  Negative for chills and fever. HENT:  Negative for ear pain, hearing loss, rhinorrhea and voice change. Eyes:  Negative for photophobia and visual disturbance. Respiratory:  Negative for cough and shortness of breath. Cardiovascular:  Negative for chest pain and palpitations. Gastrointestinal:  Negative for nausea and vomiting. Endocrine: Negative. Negative for cold intolerance and heat intolerance. Genitourinary:  Negative for difficulty urinating and flank pain. Musculoskeletal:  Negative for back pain and neck pain. Skin:  Negative for color change and rash. Allergic/Immunologic: Negative for environmental allergies and food allergies. Neurological:  Negative for dizziness, speech difficulty and headaches. Hematological:  Does not bruise/bleed easily. Psychiatric/Behavioral:  Negative for sleep disturbance and suicidal ideas. Objective:     Physical Exam  Vitals and nursing note reviewed. Constitutional:       Appearance: She is well-developed. HENT:      Head: Atraumatic. Right Ear: External ear normal.      Left Ear: External ear normal.      Nose: Nose normal.   Eyes:      Conjunctiva/sclera: Conjunctivae normal.      Pupils: Pupils are equal, round, and reactive to light. Cardiovascular:      Rate and Rhythm: Normal rate and regular rhythm. Heart sounds: Normal heart sounds, S1 normal and S2 normal.   Pulmonary:      Effort: Pulmonary effort is normal.      Breath sounds: Normal breath sounds. Abdominal:      General: Bowel sounds are normal.      Palpations: Abdomen is soft. Musculoskeletal:         General: Normal range of motion. Cervical back: Normal range of motion and neck supple. Skin:     General: Skin is warm and dry. Neurological:      Mental Status: She is alert and oriented to person, place, and time. Psychiatric:         Behavior: Behavior normal.     /64   Pulse (!) 118   Temp 97.7 °F (36.5 °C) (Temporal)   Ht 5' 1\" (1.549 m)   Wt 168 lb (76.2 kg)   LMP  (LMP Unknown)   SpO2 94%   BMI 31.74 kg/m²     Assessment:       Diagnosis Orders   1.  Type 2 diabetes mellitus with complication, without long-term current use of insulin (HCC)  CBC with Auto Differential    Comprehensive Metabolic Panel    Lipid Panel    Hemoglobin A1C    TSH      2. Essential hypertension  CBC with Auto Differential    Comprehensive Metabolic Panel    Lipid Panel    Hemoglobin A1C    TSH      3. Type 2 diabetes mellitus without complication, without long-term current use of insulin (HCC)  CBC with Auto Differential    Comprehensive Metabolic Panel    Lipid Panel    Hemoglobin A1C    TSH      4. Vaginal candidiasis  Urinalysis with Reflex to Culture          No results found for this visit on 03/02/23. Plan:   More than 50% of the time was spent counseling and coordinating care for a total time of 40 min face to face. Begin diflucan 100 mg for 7 days. Fasting labs in [address]. Follow-up in 2 weeks to discuss labs. PDMP Monitoring:    Last PDMP Charly as Reviewed:  Review User Review Instant Review Result            Urine Drug Screenings (1 yr)    No resulted procedures found. Medication Contract and Consent for Opioid Use Documents Filed        No documents found                     Patient given educational materials -see patient instructions. Discussed use, benefit, and side effects of prescribed medications. All patient questions answered. Pt voiced understanding. Reviewed health maintenance. Instructed to continue currentmedications, diet and exercise. Patient agreed with treatment plan. Follow up as directed. MEDICATIONS:  Orders Placed This Encounter   Medications    fluconazole (DIFLUCAN) 100 MG tablet     Sig: Take 1 tablet by mouth daily for 7 days     Dispense:  7 tablet     Refill:  0         ORDERS:  Orders Placed This Encounter   Procedures    CBC with Auto Differential    Comprehensive Metabolic Panel    Lipid Panel    Hemoglobin A1C    TSH    Urinalysis with Reflex to Culture       Follow-up:  Return in about 2 weeks (around 3/16/2023) for follow-up with labs, medicare AW. PATIENT INSTRUCTIONS:  Patient Instructions   Begin diflucan 100 mg for 7 days. Fasting labs in [address].    Follow-up in 2 weeks to discuss labs. Electronically signed by MIN Rodriguez on 3/2/2023 at 3:21 PM    EMR Dragon/transcription disclaimer:  Much of thisencounter note is electronic transcription/translation of spoken language to printed texts. The electronic translation of spoken language may be erroneous, or at times, nonsensical words or phrases may be inadvertentlytranscribed.   Although I have reviewed the note for such errors, some may still exist.

## 2023-03-13 DIAGNOSIS — I10 ESSENTIAL HYPERTENSION: ICD-10-CM

## 2023-03-13 DIAGNOSIS — E11.8 TYPE 2 DIABETES MELLITUS WITH COMPLICATION, WITHOUT LONG-TERM CURRENT USE OF INSULIN (HCC): ICD-10-CM

## 2023-03-13 DIAGNOSIS — B37.31 VAGINAL CANDIDIASIS: ICD-10-CM

## 2023-03-13 DIAGNOSIS — E11.9 TYPE 2 DIABETES MELLITUS WITHOUT COMPLICATION, WITHOUT LONG-TERM CURRENT USE OF INSULIN (HCC): ICD-10-CM

## 2023-03-13 LAB
ALBUMIN SERPL-MCNC: 4.2 G/DL (ref 3.5–5.2)
ALP BLD-CCNC: 61 U/L (ref 35–104)
ALT SERPL-CCNC: 14 U/L (ref 5–33)
ANION GAP SERPL CALCULATED.3IONS-SCNC: 12 MMOL/L (ref 7–19)
AST SERPL-CCNC: 16 U/L (ref 5–32)
BACTERIA: ABNORMAL /HPF
BASOPHILS ABSOLUTE: 0.1 K/UL (ref 0–0.2)
BASOPHILS RELATIVE PERCENT: 1.5 % (ref 0–1)
BILIRUB SERPL-MCNC: 0.5 MG/DL (ref 0.2–1.2)
BILIRUBIN URINE: NEGATIVE
BLOOD, URINE: ABNORMAL
BUN BLDV-MCNC: 15 MG/DL (ref 8–23)
CALCIUM SERPL-MCNC: 9.3 MG/DL (ref 8.8–10.2)
CHLORIDE BLD-SCNC: 102 MMOL/L (ref 98–111)
CHOLESTEROL, TOTAL: 176 MG/DL (ref 160–199)
CLARITY: ABNORMAL
CO2: 26 MMOL/L (ref 22–29)
COLOR: ABNORMAL
CREAT SERPL-MCNC: 1 MG/DL (ref 0.5–0.9)
EOSINOPHILS ABSOLUTE: 0.2 K/UL (ref 0–0.6)
EOSINOPHILS RELATIVE PERCENT: 3.4 % (ref 0–5)
EPITHELIAL CELLS, UA: ABNORMAL /HPF
GFR SERPL CREATININE-BSD FRML MDRD: 58 ML/MIN/{1.73_M2}
GLUCOSE BLD-MCNC: 153 MG/DL (ref 74–109)
GLUCOSE URINE: NEGATIVE MG/DL
HBA1C MFR BLD: 8.8 % (ref 4–6)
HCT VFR BLD CALC: 46.6 % (ref 37–47)
HDLC SERPL-MCNC: 65 MG/DL (ref 65–121)
HEMOGLOBIN: 15 G/DL (ref 12–16)
IMMATURE GRANULOCYTES #: 0 K/UL
KETONES, URINE: ABNORMAL MG/DL
LDL CHOLESTEROL CALCULATED: 72 MG/DL
LEUKOCYTE ESTERASE, URINE: ABNORMAL
LYMPHOCYTES ABSOLUTE: 2.6 K/UL (ref 1.1–4.5)
LYMPHOCYTES RELATIVE PERCENT: 39.4 % (ref 20–40)
MCH RBC QN AUTO: 30.1 PG (ref 27–31)
MCHC RBC AUTO-ENTMCNC: 32.2 G/DL (ref 33–37)
MCV RBC AUTO: 93.4 FL (ref 81–99)
MONOCYTES ABSOLUTE: 0.4 K/UL (ref 0–0.9)
MONOCYTES RELATIVE PERCENT: 5.7 % (ref 0–10)
MUCUS: ABNORMAL /LPF
NEUTROPHILS ABSOLUTE: 3.2 K/UL (ref 1.5–7.5)
NEUTROPHILS RELATIVE PERCENT: 49.4 % (ref 50–65)
NITRITE, URINE: NEGATIVE
PDW BLD-RTO: 12.7 % (ref 11.5–14.5)
PH UA: 5.5 (ref 5–8)
PLATELET # BLD: 277 K/UL (ref 130–400)
PMV BLD AUTO: 10.5 FL (ref 9.4–12.3)
POTASSIUM SERPL-SCNC: 4.1 MMOL/L (ref 3.5–5)
PROTEIN UA: 100 MG/DL
RBC # BLD: 4.99 M/UL (ref 4.2–5.4)
RBC UA: ABNORMAL /HPF (ref 0–2)
SODIUM BLD-SCNC: 140 MMOL/L (ref 136–145)
SPECIFIC GRAVITY UA: 1.03 (ref 1–1.03)
TOTAL PROTEIN: 6.8 G/DL (ref 6.6–8.7)
TRIGL SERPL-MCNC: 195 MG/DL (ref 0–149)
TSH SERPL DL<=0.05 MIU/L-ACNC: 1.81 UIU/ML (ref 0.27–4.2)
UROBILINOGEN, URINE: 1 E.U./DL
WBC # BLD: 6.5 K/UL (ref 4.8–10.8)
WBC UA: ABNORMAL /HPF (ref 0–5)

## 2023-03-14 ENCOUNTER — TELEMEDICINE (OUTPATIENT)
Dept: PRIMARY CARE CLINIC | Age: 78
End: 2023-03-14
Payer: MEDICARE

## 2023-03-14 ENCOUNTER — TELEPHONE (OUTPATIENT)
Dept: PRIMARY CARE CLINIC | Age: 78
End: 2023-03-14

## 2023-03-14 DIAGNOSIS — N30.01 ACUTE CYSTITIS WITH HEMATURIA: ICD-10-CM

## 2023-03-14 DIAGNOSIS — E11.8 TYPE 2 DIABETES MELLITUS WITH COMPLICATION, WITHOUT LONG-TERM CURRENT USE OF INSULIN (HCC): Primary | ICD-10-CM

## 2023-03-14 DIAGNOSIS — I10 ESSENTIAL HYPERTENSION: ICD-10-CM

## 2023-03-14 DIAGNOSIS — E03.9 ACQUIRED HYPOTHYROIDISM: ICD-10-CM

## 2023-03-14 DIAGNOSIS — E78.2 MIXED HYPERLIPIDEMIA: ICD-10-CM

## 2023-03-14 PROCEDURE — 1036F TOBACCO NON-USER: CPT | Performed by: NURSE PRACTITIONER

## 2023-03-14 PROCEDURE — 3052F HG A1C>EQUAL 8.0%<EQUAL 9.0%: CPT | Performed by: NURSE PRACTITIONER

## 2023-03-14 PROCEDURE — G8399 PT W/DXA RESULTS DOCUMENT: HCPCS | Performed by: NURSE PRACTITIONER

## 2023-03-14 PROCEDURE — 99213 OFFICE O/P EST LOW 20 MIN: CPT | Performed by: NURSE PRACTITIONER

## 2023-03-14 PROCEDURE — G8417 CALC BMI ABV UP PARAM F/U: HCPCS | Performed by: NURSE PRACTITIONER

## 2023-03-14 PROCEDURE — 1123F ACP DISCUSS/DSCN MKR DOCD: CPT | Performed by: NURSE PRACTITIONER

## 2023-03-14 PROCEDURE — G8484 FLU IMMUNIZE NO ADMIN: HCPCS | Performed by: NURSE PRACTITIONER

## 2023-03-14 PROCEDURE — 1090F PRES/ABSN URINE INCON ASSESS: CPT | Performed by: NURSE PRACTITIONER

## 2023-03-14 PROCEDURE — G8427 DOCREV CUR MEDS BY ELIG CLIN: HCPCS | Performed by: NURSE PRACTITIONER

## 2023-03-14 RX ORDER — FLUCONAZOLE 150 MG/1
150 TABLET ORAL ONCE
Qty: 1 TABLET | Refills: 0 | Status: SHIPPED | OUTPATIENT
Start: 2023-03-14 | End: 2023-03-14

## 2023-03-14 RX ORDER — CEPHALEXIN 500 MG/1
500 CAPSULE ORAL 2 TIMES DAILY
Qty: 20 CAPSULE | Refills: 0 | Status: SHIPPED | OUTPATIENT
Start: 2023-03-14

## 2023-03-14 ASSESSMENT — ENCOUNTER SYMPTOMS
SHORTNESS OF BREATH: 0
COUGH: 0
NAUSEA: 0
VOICE CHANGE: 0
RHINORRHEA: 0
BACK PAIN: 0
PHOTOPHOBIA: 0
COLOR CHANGE: 0
VOMITING: 0

## 2023-03-14 NOTE — PROGRESS NOTES
Chanda Gutierrez (:  1945) is a Established patient, here for evaluation of the following:    Assessment & Plan   Below is the assessment and plan developed based on review of pertinent history, physical exam, labs, studies, and medications. 1. Type 2 diabetes mellitus with complication, without long-term current use of insulin (HCC)  -     dapagliflozin (FARXIGA) 10 MG tablet; Take 1 tablet by mouth every morning, Disp-90 tablet, R-1Normal  2. Acquired hypothyroidism  3. Essential hypertension  4. Mixed hyperlipidemia  5. Acute cystitis with hematuria  -     cephALEXin (KEFLEX) 500 MG capsule; Take 1 capsule by mouth 2 times daily, Disp-20 capsule, R-0Normal  -     fluconazole (DIFLUCAN) 150 MG tablet; Take 1 tablet by mouth once for 1 dose, Disp-1 tablet, R-0Normal  No follow-ups on file. Subjective   Patient presents today for follow-up diabetes and review recent labs. Her current A1C is 8.8 which has increased from 8.6. Her renal function has declined. Her current DM medications include Jardiance, glimepiride and metformin. TSH was in normal range. Lipids stable on statin therapy. She tolerates this well. Her urinalysis showed moderate leukocytes and small blood. Culture is pending. She reports dysuria and frequency. Review of Systems   Constitutional:  Negative for chills and fever. HENT:  Negative for ear pain, hearing loss, rhinorrhea and voice change. Eyes:  Negative for photophobia and visual disturbance. Respiratory:  Negative for cough and shortness of breath. Cardiovascular:  Negative for chest pain and palpitations. Gastrointestinal:  Negative for nausea and vomiting. Endocrine: Negative. Negative for cold intolerance and heat intolerance. Genitourinary:  Positive for dysuria and frequency. Negative for difficulty urinating and flank pain. Musculoskeletal:  Negative for back pain and neck pain. Skin:  Negative for color change and rash. Allergic/Immunologic: Negative for environmental allergies and food allergies. Neurological:  Negative for dizziness, speech difficulty and headaches. Hematological:  Does not bruise/bleed easily. Psychiatric/Behavioral:  Negative for sleep disturbance and suicidal ideas.          Objective   Patient-Reported Vitals  Patient-Reported Weight: 168  Patient-Reported Height: 5 1       Physical Exam  [INSTRUCTIONS:  \"[x]\" Indicates a positive item  \"[]\" Indicates a negative item  -- DELETE ALL ITEMS NOT EXAMINED]    Constitutional: [x] Appears well-developed and well-nourished [x] No apparent distress      [] Abnormal -     Mental status: [x] Alert and awake  [x] Oriented to person/place/time [x] Able to follow commands    [] Abnormal -     Eyes:   EOM    [x]  Normal    [] Abnormal -   Sclera  [x]  Normal    [] Abnormal -          Discharge [x]  None visible   [] Abnormal -     HENT: [x] Normocephalic, atraumatic  [] Abnormal -   [x] Mouth/Throat: Mucous membranes are moist    External Ears [x] Normal  [] Abnormal -    Neck: [x] No visualized mass [] Abnormal -     Pulmonary/Chest: [x] Respiratory effort normal   [x] No visualized signs of difficulty breathing or respiratory distress        [] Abnormal -      Musculoskeletal:   [x] Normal gait with no signs of ataxia         [x] Normal range of motion of neck        [] Abnormal -     Neurological:        [x] No Facial Asymmetry (Cranial nerve 7 motor function) (limited exam due to video visit)          [x] No gaze palsy        [] Abnormal -          Skin:        [x] No significant exanthematous lesions or discoloration noted on facial skin         [] Abnormal -            Psychiatric:       [x] Normal Affect [] Abnormal -        [x] No Hallucinations    Other pertinent observable physical exam findings:-         On this date 3/14/2023 I have spent 15 minutes reviewing previous notes, test results and face to face (virtual) with the patient discussing the diagnosis and importance of compliance with the treatment plan as well as documenting on the day of the visit. Meenakshibakarimargie Ash, was evaluated through a synchronous (real-time) audio-video encounter. The patient (or guardian if applicable) is aware that this is a billable service, which includes applicable co-pays. This Virtual Visit was conducted with patient's (and/or legal guardian's) consent. The visit was conducted pursuant to the emergency declaration under the [address] [place] [address], [address] [place] [address] authority and the [employer] Act. Patient identification was verified, and a caregiver was present when appropriate.    The patient was located at Home: [address] [place], [place] [address]   Provider was located at [facility] ([address]): MIN Vaca

## 2023-03-14 NOTE — PATIENT INSTRUCTIONS
Begin Farxiga 10 mg daily.   Discontinue Jardiance.   Be sure to glimepiride twice daily.  Keflex twice daily for UTI.   Return in 3 months for DM check/A1C in office.

## 2023-03-15 LAB
BACTERIA UR CULT: ABNORMAL
BACTERIA UR CULT: ABNORMAL
ORGANISM: ABNORMAL

## 2023-03-15 RX ORDER — CEFDINIR 300 MG/1
300 CAPSULE ORAL 2 TIMES DAILY
Qty: 20 CAPSULE | Refills: 0 | Status: SHIPPED | OUTPATIENT
Start: 2023-03-15 | End: 2023-03-25

## 2023-04-11 DIAGNOSIS — M51.36 DEGENERATIVE DISC DISEASE, LUMBAR: ICD-10-CM

## 2023-04-12 RX ORDER — CYCLOBENZAPRINE HCL 10 MG
TABLET ORAL
Qty: 20 TABLET | Refills: 0 | Status: SHIPPED | OUTPATIENT
Start: 2023-04-12

## 2023-04-21 ENCOUNTER — TELEPHONE (OUTPATIENT)
Dept: FAMILY MEDICINE CLINIC | Facility: CLINIC | Age: 78
End: 2023-04-21
Payer: MEDICARE

## 2023-05-05 DIAGNOSIS — E11.8 TYPE 2 DIABETES MELLITUS WITH COMPLICATION, WITHOUT LONG-TERM CURRENT USE OF INSULIN (HCC): ICD-10-CM

## 2023-05-05 DIAGNOSIS — I10 ESSENTIAL HYPERTENSION: ICD-10-CM

## 2023-05-05 RX ORDER — GLIMEPIRIDE 4 MG/1
TABLET ORAL
Qty: 60 TABLET | Refills: 0 | Status: SHIPPED | OUTPATIENT
Start: 2023-05-05

## 2023-05-05 RX ORDER — LISINOPRIL 10 MG/1
TABLET ORAL
Qty: 30 TABLET | Refills: 0 | Status: SHIPPED | OUTPATIENT
Start: 2023-05-05 | End: 2023-06-01 | Stop reason: SDUPTHER

## 2023-06-01 DIAGNOSIS — E11.8 TYPE 2 DIABETES MELLITUS WITH COMPLICATION, WITHOUT LONG-TERM CURRENT USE OF INSULIN (HCC): ICD-10-CM

## 2023-06-01 DIAGNOSIS — I10 ESSENTIAL HYPERTENSION: ICD-10-CM

## 2023-06-01 RX ORDER — LISINOPRIL 10 MG/1
TABLET ORAL
Qty: 30 TABLET | Refills: 0 | OUTPATIENT
Start: 2023-06-01

## 2023-06-01 RX ORDER — GLIMEPIRIDE 4 MG/1
TABLET ORAL
Qty: 60 TABLET | Refills: 0 | OUTPATIENT
Start: 2023-06-01

## 2023-06-01 RX ORDER — LISINOPRIL 10 MG/1
TABLET ORAL
Qty: 30 TABLET | Refills: 0 | Status: SHIPPED | OUTPATIENT
Start: 2023-06-01 | End: 2023-06-24

## 2023-06-10 DIAGNOSIS — E11.8 TYPE 2 DIABETES MELLITUS WITH COMPLICATION, WITHOUT LONG-TERM CURRENT USE OF INSULIN (HCC): ICD-10-CM

## 2023-06-12 RX ORDER — GLIMEPIRIDE 4 MG/1
TABLET ORAL
Qty: 60 TABLET | Refills: 0 | OUTPATIENT
Start: 2023-06-12

## 2023-06-23 DIAGNOSIS — I10 ESSENTIAL HYPERTENSION: ICD-10-CM

## 2023-06-24 RX ORDER — LISINOPRIL 10 MG/1
TABLET ORAL
Qty: 30 TABLET | Refills: 0 | Status: SHIPPED | OUTPATIENT
Start: 2023-06-24

## 2023-07-20 DIAGNOSIS — I10 ESSENTIAL HYPERTENSION: ICD-10-CM

## 2023-07-20 RX ORDER — LISINOPRIL 10 MG/1
TABLET ORAL
Qty: 30 TABLET | Refills: 0 | Status: SHIPPED | OUTPATIENT
Start: 2023-07-20

## 2023-07-20 NOTE — TELEPHONE ENCOUNTER
Elmer Fay called requesting a refill of the below medication which has been pended for you:   Left message for patient to call office for an appointment. Requested Prescriptions     Pending Prescriptions Disp Refills    lisinopril (PRINIVIL;ZESTRIL) 10 MG tablet [Pharmacy Med Name: LISINOPRIL 10 MG TABLET] 30 tablet 0     Sig: TAKE 1 TABLET BY MOUTH EVERY DAY. FOLLOW UP APPOINTMENT FOR FUTURE REFILLS.        Last Appointment Date: 3/14/2023  Next Appointment Date: Visit date not found    Allergies   Allergen Reactions    Bee Venom Shortness Of Breath and Swelling    Adhesive Tape Rash

## 2023-08-14 RX ORDER — LEVOTHYROXINE SODIUM 0.15 MG/1
150 TABLET ORAL DAILY
Qty: 14 TABLET | Refills: 0 | Status: SHIPPED | OUTPATIENT
Start: 2023-08-14

## 2023-08-18 DIAGNOSIS — I10 ESSENTIAL HYPERTENSION: ICD-10-CM

## 2023-08-18 RX ORDER — LISINOPRIL 10 MG/1
TABLET ORAL
Qty: 14 TABLET | Refills: 0 | Status: SHIPPED | OUTPATIENT
Start: 2023-08-18

## 2023-08-23 DIAGNOSIS — M85.80 OSTEOPENIA, UNSPECIFIED LOCATION: ICD-10-CM

## 2023-08-23 RX ORDER — ALENDRONATE SODIUM 70 MG/1
TABLET ORAL
Qty: 4 TABLET | Refills: 0 | Status: SHIPPED | OUTPATIENT
Start: 2023-08-23 | End: 2023-09-18

## 2023-08-31 ENCOUNTER — OFFICE VISIT (OUTPATIENT)
Dept: PRIMARY CARE CLINIC | Age: 78
End: 2023-08-31
Payer: MEDICARE

## 2023-08-31 VITALS
SYSTOLIC BLOOD PRESSURE: 118 MMHG | BODY MASS INDEX: 31.53 KG/M2 | TEMPERATURE: 98 F | HEIGHT: 61 IN | OXYGEN SATURATION: 94 % | WEIGHT: 167 LBS | HEART RATE: 76 BPM | DIASTOLIC BLOOD PRESSURE: 74 MMHG

## 2023-08-31 DIAGNOSIS — E11.9 TYPE 2 DIABETES MELLITUS WITHOUT COMPLICATION, WITHOUT LONG-TERM CURRENT USE OF INSULIN (HCC): ICD-10-CM

## 2023-08-31 DIAGNOSIS — I10 ESSENTIAL HYPERTENSION: ICD-10-CM

## 2023-08-31 DIAGNOSIS — Z00.00 MEDICARE ANNUAL WELLNESS VISIT, SUBSEQUENT: Primary | ICD-10-CM

## 2023-08-31 LAB
ALBUMIN SERPL-MCNC: 4.3 G/DL (ref 3.5–5.2)
ALP SERPL-CCNC: 72 U/L (ref 35–104)
ALT SERPL-CCNC: 13 U/L (ref 5–33)
ANION GAP SERPL CALCULATED.3IONS-SCNC: 15 MMOL/L (ref 7–19)
AST SERPL-CCNC: 17 U/L (ref 5–32)
BACTERIA #/AREA URNS HPF: ABNORMAL /HPF
BASOPHILS # BLD: 0.1 K/UL (ref 0–0.2)
BASOPHILS NFR BLD: 1.3 % (ref 0–1)
BILIRUB SERPL-MCNC: 0.5 MG/DL (ref 0.2–1.2)
BILIRUB UR QL STRIP: NEGATIVE
BUN SERPL-MCNC: 16 MG/DL (ref 8–23)
CALCIUM SERPL-MCNC: 9.5 MG/DL (ref 8.8–10.2)
CHLORIDE SERPL-SCNC: 103 MMOL/L (ref 98–111)
CHOLEST SERPL-MCNC: 216 MG/DL (ref 160–199)
CLARITY UR: ABNORMAL
CO2 SERPL-SCNC: 23 MMOL/L (ref 22–29)
COLOR UR: YELLOW
CREAT SERPL-MCNC: 0.9 MG/DL (ref 0.5–0.9)
CREAT UR-MCNC: 283.5 MG/DL (ref 28–217)
EOSINOPHIL # BLD: 0.2 K/UL (ref 0–0.6)
EOSINOPHIL NFR BLD: 1.8 % (ref 0–5)
ERYTHROCYTE [DISTWIDTH] IN BLOOD BY AUTOMATED COUNT: 13.2 % (ref 11.5–14.5)
GLUCOSE SERPL-MCNC: 217 MG/DL (ref 74–109)
GLUCOSE UR STRIP.AUTO-MCNC: 100 MG/DL
HBA1C MFR BLD: 8.2 % (ref 4–6)
HCT VFR BLD AUTO: 44.4 % (ref 37–47)
HDLC SERPL-MCNC: 53 MG/DL (ref 65–121)
HGB BLD-MCNC: 14.6 G/DL (ref 12–16)
HGB UR STRIP.AUTO-MCNC: NEGATIVE MG/L
IMM GRANULOCYTES # BLD: 0.1 K/UL
KETONES UR STRIP.AUTO-MCNC: NEGATIVE MG/DL
LDLC SERPL CALC-MCNC: 121 MG/DL
LEUKOCYTE ESTERASE UR QL STRIP.AUTO: ABNORMAL
LYMPHOCYTES # BLD: 2.5 K/UL (ref 1.1–4.5)
LYMPHOCYTES NFR BLD: 30.5 % (ref 20–40)
MCH RBC QN AUTO: 30 PG (ref 27–31)
MCHC RBC AUTO-ENTMCNC: 32.9 G/DL (ref 33–37)
MCV RBC AUTO: 91.4 FL (ref 81–99)
MICROALBUMIN UR-MCNC: 9 MG/DL (ref 0–19)
MICROALBUMIN/CREAT UR-RTO: 31.7 MG/G
MONOCYTES # BLD: 0.6 K/UL (ref 0–0.9)
MONOCYTES NFR BLD: 6.7 % (ref 0–10)
NEUTROPHILS # BLD: 4.9 K/UL (ref 1.5–7.5)
NEUTS SEG NFR BLD: 59 % (ref 50–65)
NITRITE UR QL STRIP.AUTO: NEGATIVE
PH UR STRIP.AUTO: 5.5 [PH] (ref 5–8)
PLATELET # BLD AUTO: 294 K/UL (ref 130–400)
PMV BLD AUTO: 11.1 FL (ref 9.4–12.3)
POTASSIUM SERPL-SCNC: 4 MMOL/L (ref 3.5–5)
PROT SERPL-MCNC: 7.2 G/DL (ref 6.6–8.7)
PROT UR STRIP.AUTO-MCNC: 30 MG/DL
RBC # BLD AUTO: 4.86 M/UL (ref 4.2–5.4)
RBC #/AREA URNS HPF: ABNORMAL /HPF (ref 0–2)
SODIUM SERPL-SCNC: 141 MMOL/L (ref 136–145)
SP GR UR STRIP.AUTO: 1.02 (ref 1–1.03)
SQUAMOUS #/AREA URNS HPF: ABNORMAL /HPF
TRIGL SERPL-MCNC: 210 MG/DL (ref 0–149)
TSH SERPL DL<=0.005 MIU/L-ACNC: 1.47 UIU/ML (ref 0.27–4.2)
UROBILINOGEN UR STRIP.AUTO-MCNC: 0.2 E.U./DL
WBC # BLD AUTO: 8.3 K/UL (ref 4.8–10.8)
WBC #/AREA URNS HPF: ABNORMAL /HPF (ref 0–5)
YEAST #/AREA URNS HPF: PRESENT /HPF

## 2023-08-31 PROCEDURE — 3078F DIAST BP <80 MM HG: CPT | Performed by: NURSE PRACTITIONER

## 2023-08-31 PROCEDURE — 3074F SYST BP LT 130 MM HG: CPT | Performed by: NURSE PRACTITIONER

## 2023-08-31 PROCEDURE — G0439 PPPS, SUBSEQ VISIT: HCPCS | Performed by: NURSE PRACTITIONER

## 2023-08-31 PROCEDURE — 1123F ACP DISCUSS/DSCN MKR DOCD: CPT | Performed by: NURSE PRACTITIONER

## 2023-08-31 PROCEDURE — 3052F HG A1C>EQUAL 8.0%<EQUAL 9.0%: CPT | Performed by: NURSE PRACTITIONER

## 2023-08-31 RX ORDER — SEMAGLUTIDE 0.68 MG/ML
0.25 INJECTION, SOLUTION SUBCUTANEOUS WEEKLY
Qty: 3 ML | Refills: 0 | Status: SHIPPED | OUTPATIENT
Start: 2023-08-31

## 2023-08-31 ASSESSMENT — PATIENT HEALTH QUESTIONNAIRE - PHQ9
2. FEELING DOWN, DEPRESSED OR HOPELESS: 1
SUM OF ALL RESPONSES TO PHQ9 QUESTIONS 1 & 2: 2
SUM OF ALL RESPONSES TO PHQ QUESTIONS 1-9: 2
1. LITTLE INTEREST OR PLEASURE IN DOING THINGS: 1

## 2023-08-31 ASSESSMENT — LIFESTYLE VARIABLES
HOW MANY STANDARD DRINKS CONTAINING ALCOHOL DO YOU HAVE ON A TYPICAL DAY: PATIENT DOES NOT DRINK
HOW OFTEN DO YOU HAVE A DRINK CONTAINING ALCOHOL: NEVER

## 2023-08-31 NOTE — PROGRESS NOTES
[facility]  [address] [facility],[address] [address]  Dept: [phone]  Dept Fax: [phone]  Loc: [phone]    Joe Rodriguez is a 69 y.o. female who presents today for her medical conditions/complaints as noted below. Joe Rodriguez is c/o of Medicare AWV (Pt in office for AWV - no new issues or concerns)        HPI:     HPI   Chief Complaint   Patient presents with    Medicare AWV     Pt in office for AWV - no new issues or concerns       Patient is wanting to start on injectable for weight loss. Hemoglobin A1C   Date Value Ref Range Status   03/13/2023 8.8 (H) 4.0 - 6.0 % Final     Comment:     HbA1c levels >6% are an indication of hyperglycemia during the preceding 2  to 3 months or longer. HbA1c levels may reach 20% or higher in poorly controlled diabetes. Therapeutic action is suggested at levels above 8%. Diabetes patients with HbA1c levels below 7% meet the goal of the American  Diabetes Association. HbA1c levels below the established reference range may indicate recent  episodes of hypoglycemia, the presence of Hb variants, or shortened lifetime  of erythrocytes.               Past Medical History:   Diagnosis Date    CAD (coronary artery disease)     Carotid artery disease (HCC)     CPAP (continuous positive airway pressure) dependence     11cm    Depression     Diabetes (HCC)     Fibromyalgia     Hypothyroidism     JOSE (obstructive sleep apnea)     AHI:  27.3 per PSG, 2011/CPAP    PLMD (periodic limb movement disorder)     RLS (restless legs syndrome)       Past Surgical History:   Procedure Laterality Date    COLONOSCOPY  12/04/2014    Dr Anabel Lundberg- no polyps    COLONOSCOPY  07/25/2019    Dr Cookie Obando, 5 yr recall    COLONOSCOPY N/A 7/25/2019    COLORECTAL CANCER SCREENING, NOT HIGH RISK performed by Kyler Stack MD at [address] [place] [address]      teeth extractions    PTCA      with stents    TUBAL LIGATION

## 2023-08-31 NOTE — PATIENT INSTRUCTIONS
instructions adapted under license by [facility] ([facility]). If you have questions about a medical condition or this instruction, always ask your healthcare professional. [address] any warranty or liability for your use of this information. A Healthy Heart: Care Instructions  Your Care Instructions     Coronary artery disease, also called heart disease, occurs when a substance called plaque builds up in the vessels that supply oxygen-rich blood to your heart muscle. This can narrow the blood vessels and reduce blood flow. A heart attack happens when blood flow is completely blocked. A high-fat diet, smoking, and other factors increase the risk of heart disease. Your doctor has found that you have a chance of having heart disease. You can do lots of things to keep your heart healthy. It may not be easy, but you can change your diet, exercise more, and quit smoking. These steps really work to lower your chance of heart disease. Follow-up care is a key part of your treatment and safety. Be sure to make and go to all appointments, and call your doctor if you are having problems. It's also a good idea to know your test results and keep a list of the medicines you take. How can you care for yourself at home? Diet    Use less salt when you cook and eat. This helps lower your blood pressure. Taste food before salting. Add only a little salt when you think you need it. With time, your taste buds will adjust to less salt. Eat fewer snack items, fast foods, canned soups, and other high-salt, high-fat, processed foods. Read food labels and try to avoid saturated and trans fats. They increase your risk of heart disease by raising cholesterol levels. Limit the amount of solid fat-butter, margarine, and shortening-you eat. Use olive, peanut, or canola oil when you cook. Bake, broil, and steam foods instead of frying them. Eat a variety of fruit and vegetables every day.  Dark green,

## 2023-08-31 NOTE — PROGRESS NOTES
Medicare Annual Wellness Visit    Jeannie Stage is here for Medicare AWV (Pt in office for AWV - no new issues or concerns)    Assessment & Plan   Medicare annual wellness visit, subsequent  Essential hypertension  -     CBC with Auto Differential; Future  -     Comprehensive Metabolic Panel; Future  -     Lipid Panel; Future  -     Hemoglobin A1C; Future  -     TSH; Future  -     Urinalysis with Reflex to Culture; Future  -     Microalbumin / Creatinine Urine Ratio; Future  Type 2 diabetes mellitus without complication, without long-term current use of insulin (HCC)  -     CBC with Auto Differential; Future  -     Comprehensive Metabolic Panel; Future  -     Lipid Panel; Future  -     Hemoglobin A1C; Future  -     TSH; Future  -     Urinalysis with Reflex to Culture; Future  -     Microalbumin / Creatinine Urine Ratio; Future    Recommendations for Preventive Services Due: see orders and patient instructions/AVS.  Recommended screening schedule for the next 5-10 years is provided to the patient in written form: see Patient Instructions/AVS.     No follow-ups on file. Subjective   The following acute and/or chronic problems were also addressed today:  None. Patient's complete Health Risk Assessment and screening values have been reviewed and are found in Flowsheets. The following problems were reviewed today and where indicated follow up appointments were made and/or referrals ordered.     Positive Risk Factor Screenings with Interventions:    Fall Risk:  Do you feel unsteady or are you worried about falling? : (!) yes  2 or more falls in past year?: (!) yes  Fall with injury in past year?: no     Interventions:    Patient advised to follow-up in this office for further evaluation and treatment              Weight and Activity:  Physical Activity: Insufficiently Active    Days of Exercise per Week: 1 day    Minutes of Exercise per Session: 10 min     On average, how many days per week do you engage in

## 2023-09-02 LAB — BACTERIA UR CULT: NORMAL

## 2023-09-04 DIAGNOSIS — E11.8 TYPE 2 DIABETES MELLITUS WITH COMPLICATION, WITHOUT LONG-TERM CURRENT USE OF INSULIN (HCC): ICD-10-CM

## 2023-09-05 RX ORDER — DAPAGLIFLOZIN 10 MG/1
TABLET, FILM COATED ORAL
Qty: 90 TABLET | Refills: 1 | Status: SHIPPED | OUTPATIENT
Start: 2023-09-05

## 2023-09-17 DIAGNOSIS — M85.80 OSTEOPENIA, UNSPECIFIED LOCATION: ICD-10-CM

## 2023-09-18 RX ORDER — ALENDRONATE SODIUM 70 MG/1
TABLET ORAL
Qty: 4 TABLET | Refills: 0 | Status: SHIPPED | OUTPATIENT
Start: 2023-09-18

## 2023-09-18 NOTE — TELEPHONE ENCOUNTER
Nathaly Rahman called requesting a refill of the below medication which has been pended for you:     Requested Prescriptions     Pending Prescriptions Disp Refills    alendronate (FOSAMAX) 70 MG tablet [Pharmacy Med Name: ALENDRONATE SODIUM 70 MG TAB] 4 tablet 0     Sig: TAKE ONE TABLET EVERY WEEK PATIENT WILL NEED APPOINTMENT FOR FURTHER REFILLS       Last Appointment Date: 8/31/2023  Next Appointment Date: 10/4/2023    Allergies   Allergen Reactions    Bee Venom Shortness Of Breath and Swelling    Adhesive Tape Rash

## 2023-10-03 RX ORDER — SEMAGLUTIDE 0.68 MG/ML
INJECTION, SOLUTION SUBCUTANEOUS
Qty: 3 ML | Refills: 0 | Status: SHIPPED | OUTPATIENT
Start: 2023-10-03

## 2023-10-04 ENCOUNTER — OFFICE VISIT (OUTPATIENT)
Dept: PRIMARY CARE CLINIC | Age: 78
End: 2023-10-04
Payer: MEDICARE

## 2023-10-04 VITALS
DIASTOLIC BLOOD PRESSURE: 72 MMHG | HEIGHT: 61 IN | BODY MASS INDEX: 30.62 KG/M2 | HEART RATE: 73 BPM | WEIGHT: 162.2 LBS | SYSTOLIC BLOOD PRESSURE: 116 MMHG | TEMPERATURE: 97.4 F | OXYGEN SATURATION: 95 %

## 2023-10-04 DIAGNOSIS — R05.1 ACUTE COUGH: ICD-10-CM

## 2023-10-04 DIAGNOSIS — E11.8 TYPE 2 DIABETES MELLITUS WITH COMPLICATION, WITHOUT LONG-TERM CURRENT USE OF INSULIN (HCC): Primary | ICD-10-CM

## 2023-10-04 PROCEDURE — 3074F SYST BP LT 130 MM HG: CPT | Performed by: NURSE PRACTITIONER

## 2023-10-04 PROCEDURE — G8484 FLU IMMUNIZE NO ADMIN: HCPCS | Performed by: NURSE PRACTITIONER

## 2023-10-04 PROCEDURE — 1123F ACP DISCUSS/DSCN MKR DOCD: CPT | Performed by: NURSE PRACTITIONER

## 2023-10-04 PROCEDURE — 3052F HG A1C>EQUAL 8.0%<EQUAL 9.0%: CPT | Performed by: NURSE PRACTITIONER

## 2023-10-04 PROCEDURE — 1090F PRES/ABSN URINE INCON ASSESS: CPT | Performed by: NURSE PRACTITIONER

## 2023-10-04 PROCEDURE — 3078F DIAST BP <80 MM HG: CPT | Performed by: NURSE PRACTITIONER

## 2023-10-04 PROCEDURE — G8417 CALC BMI ABV UP PARAM F/U: HCPCS | Performed by: NURSE PRACTITIONER

## 2023-10-04 PROCEDURE — 99214 OFFICE O/P EST MOD 30 MIN: CPT | Performed by: NURSE PRACTITIONER

## 2023-10-04 PROCEDURE — G8427 DOCREV CUR MEDS BY ELIG CLIN: HCPCS | Performed by: NURSE PRACTITIONER

## 2023-10-04 PROCEDURE — G8399 PT W/DXA RESULTS DOCUMENT: HCPCS | Performed by: NURSE PRACTITIONER

## 2023-10-04 PROCEDURE — 1036F TOBACCO NON-USER: CPT | Performed by: NURSE PRACTITIONER

## 2023-10-04 ASSESSMENT — ENCOUNTER SYMPTOMS
PHOTOPHOBIA: 0
COUGH: 0
VOMITING: 0
VOMITING: 0
NAUSEA: 0
VOICE CHANGE: 0
COLOR CHANGE: 0
NAUSEA: 0
BACK PAIN: 0
COUGH: 1
PHOTOPHOBIA: 0
RHINORRHEA: 0
VOICE CHANGE: 0
SHORTNESS OF BREATH: 0
COLOR CHANGE: 0
SHORTNESS OF BREATH: 0
BACK PAIN: 0
RHINORRHEA: 0

## 2023-10-04 NOTE — PATIENT INSTRUCTIONS
Follow-up in 2 months to recheck weight and A1C in office. Increase Ozempic to 0.5 mg weekly. Call if cough worsens or new symptoms occur. Recommend OTC mucinex/delsym.

## 2023-11-06 NOTE — TELEPHONE ENCOUNTER
Philip Gonzales called requesting a refill of the below medication which has been pended for you:     Requested Prescriptions     Pending Prescriptions Disp Refills    metFORMIN (GLUCOPHAGE) 1000 MG tablet [Pharmacy Med Name: METFORMIN HCL 1,000 MG TABLET] 180 tablet 1     Sig: TAKE 1 TABLET BY MOUTH TWICE A DAY WITH MEALS       Last Appointment Date: Visit date not found  Next Appointment Date: 12/5/2023    Allergies   Allergen Reactions    Bee Venom Shortness Of Breath and Swelling    Adhesive Tape Rash

## 2023-12-05 ENCOUNTER — OFFICE VISIT (OUTPATIENT)
Dept: PRIMARY CARE CLINIC | Age: 78
End: 2023-12-05
Payer: MEDICARE

## 2023-12-05 VITALS
BODY MASS INDEX: 29.45 KG/M2 | HEART RATE: 70 BPM | WEIGHT: 156 LBS | HEIGHT: 61 IN | OXYGEN SATURATION: 99 % | SYSTOLIC BLOOD PRESSURE: 112 MMHG | DIASTOLIC BLOOD PRESSURE: 68 MMHG | TEMPERATURE: 97.2 F

## 2023-12-05 DIAGNOSIS — R63.4 WEIGHT DECREASING: ICD-10-CM

## 2023-12-05 DIAGNOSIS — E11.8 TYPE 2 DIABETES MELLITUS WITH COMPLICATION, WITHOUT LONG-TERM CURRENT USE OF INSULIN (HCC): Primary | ICD-10-CM

## 2023-12-05 LAB — HBA1C MFR BLD: 6.6 %

## 2023-12-05 PROCEDURE — 3074F SYST BP LT 130 MM HG: CPT | Performed by: NURSE PRACTITIONER

## 2023-12-05 PROCEDURE — 99213 OFFICE O/P EST LOW 20 MIN: CPT | Performed by: NURSE PRACTITIONER

## 2023-12-05 PROCEDURE — G8484 FLU IMMUNIZE NO ADMIN: HCPCS | Performed by: NURSE PRACTITIONER

## 2023-12-05 PROCEDURE — 1036F TOBACCO NON-USER: CPT | Performed by: NURSE PRACTITIONER

## 2023-12-05 PROCEDURE — G8399 PT W/DXA RESULTS DOCUMENT: HCPCS | Performed by: NURSE PRACTITIONER

## 2023-12-05 PROCEDURE — 1090F PRES/ABSN URINE INCON ASSESS: CPT | Performed by: NURSE PRACTITIONER

## 2023-12-05 PROCEDURE — 1123F ACP DISCUSS/DSCN MKR DOCD: CPT | Performed by: NURSE PRACTITIONER

## 2023-12-05 PROCEDURE — 3044F HG A1C LEVEL LT 7.0%: CPT | Performed by: NURSE PRACTITIONER

## 2023-12-05 PROCEDURE — 3078F DIAST BP <80 MM HG: CPT | Performed by: NURSE PRACTITIONER

## 2023-12-05 PROCEDURE — G8427 DOCREV CUR MEDS BY ELIG CLIN: HCPCS | Performed by: NURSE PRACTITIONER

## 2023-12-05 PROCEDURE — G8417 CALC BMI ABV UP PARAM F/U: HCPCS | Performed by: NURSE PRACTITIONER

## 2023-12-05 RX ORDER — SEMAGLUTIDE 0.68 MG/ML
INJECTION, SOLUTION SUBCUTANEOUS
Qty: 3 ML | Refills: 0 | Status: CANCELLED | OUTPATIENT
Start: 2023-12-05

## 2023-12-05 RX ORDER — SEMAGLUTIDE 1.34 MG/ML
1 INJECTION, SOLUTION SUBCUTANEOUS WEEKLY
Qty: 3 ML | Refills: 5 | Status: SHIPPED | OUTPATIENT
Start: 2023-12-05

## 2023-12-05 ASSESSMENT — ENCOUNTER SYMPTOMS
COUGH: 0
PHOTOPHOBIA: 0
VOMITING: 0
COLOR CHANGE: 0
RHINORRHEA: 0
SHORTNESS OF BREATH: 0
VOICE CHANGE: 0
NAUSEA: 0
BACK PAIN: 0

## 2024-02-28 DIAGNOSIS — E11.8 TYPE 2 DIABETES MELLITUS WITH COMPLICATION, WITHOUT LONG-TERM CURRENT USE OF INSULIN (HCC): ICD-10-CM

## 2024-02-28 LAB
ALBUMIN SERPL-MCNC: 4.6 G/DL (ref 3.5–5.2)
ALP SERPL-CCNC: 82 U/L (ref 35–104)
ALT SERPL-CCNC: 23 U/L (ref 5–33)
ANION GAP SERPL CALCULATED.3IONS-SCNC: 12 MMOL/L (ref 7–19)
AST SERPL-CCNC: 24 U/L (ref 5–32)
BASOPHILS # BLD: 0.1 K/UL (ref 0–0.2)
BASOPHILS NFR BLD: 1.2 % (ref 0–1)
BILIRUB SERPL-MCNC: 0.5 MG/DL (ref 0.2–1.2)
BUN SERPL-MCNC: 21 MG/DL (ref 8–23)
CALCIUM SERPL-MCNC: 9.7 MG/DL (ref 8.8–10.2)
CHLORIDE SERPL-SCNC: 100 MMOL/L (ref 98–111)
CHOLEST SERPL-MCNC: 244 MG/DL (ref 160–199)
CO2 SERPL-SCNC: 28 MMOL/L (ref 22–29)
CREAT SERPL-MCNC: 0.8 MG/DL (ref 0.5–0.9)
CREAT UR-MCNC: 110.8 MG/DL (ref 28–217)
EOSINOPHIL # BLD: 0.3 K/UL (ref 0–0.6)
EOSINOPHIL NFR BLD: 3.8 % (ref 0–5)
ERYTHROCYTE [DISTWIDTH] IN BLOOD BY AUTOMATED COUNT: 14 % (ref 11.5–14.5)
GLUCOSE SERPL-MCNC: 155 MG/DL (ref 74–109)
HBA1C MFR BLD: 7 % (ref 4–6)
HCT VFR BLD AUTO: 47 % (ref 37–47)
HDLC SERPL-MCNC: 83 MG/DL (ref 65–121)
HGB BLD-MCNC: 15.1 G/DL (ref 12–16)
IMM GRANULOCYTES # BLD: 0 K/UL
LDLC SERPL CALC-MCNC: 135 MG/DL
LYMPHOCYTES # BLD: 2.9 K/UL (ref 1.1–4.5)
LYMPHOCYTES NFR BLD: 39.9 % (ref 20–40)
MCH RBC QN AUTO: 30 PG (ref 27–31)
MCHC RBC AUTO-ENTMCNC: 32.1 G/DL (ref 33–37)
MCV RBC AUTO: 93.4 FL (ref 81–99)
MICROALBUMIN UR-MCNC: <1.2 MG/DL (ref 0–19)
MICROALBUMIN/CREAT UR-RTO: NORMAL MG/G
MONOCYTES # BLD: 0.5 K/UL (ref 0–0.9)
MONOCYTES NFR BLD: 6.7 % (ref 0–10)
NEUTROPHILS # BLD: 3.5 K/UL (ref 1.5–7.5)
NEUTS SEG NFR BLD: 47.9 % (ref 50–65)
PLATELET # BLD AUTO: 296 K/UL (ref 130–400)
PMV BLD AUTO: 10.9 FL (ref 9.4–12.3)
POTASSIUM SERPL-SCNC: 4.3 MMOL/L (ref 3.5–5)
PROT SERPL-MCNC: 7.4 G/DL (ref 6.6–8.7)
RBC # BLD AUTO: 5.03 M/UL (ref 4.2–5.4)
SODIUM SERPL-SCNC: 140 MMOL/L (ref 136–145)
TRIGL SERPL-MCNC: 128 MG/DL (ref 0–149)
TSH SERPL DL<=0.005 MIU/L-ACNC: 50.84 UIU/ML (ref 0.27–4.2)
WBC # BLD AUTO: 7.3 K/UL (ref 4.8–10.8)

## 2024-02-29 ENCOUNTER — OFFICE VISIT (OUTPATIENT)
Dept: PRIMARY CARE CLINIC | Age: 79
End: 2024-02-29
Payer: MEDICARE

## 2024-02-29 VITALS
BODY MASS INDEX: 30.51 KG/M2 | HEIGHT: 61 IN | WEIGHT: 161.6 LBS | DIASTOLIC BLOOD PRESSURE: 68 MMHG | OXYGEN SATURATION: 97 % | SYSTOLIC BLOOD PRESSURE: 114 MMHG | HEART RATE: 74 BPM | TEMPERATURE: 97.3 F

## 2024-02-29 DIAGNOSIS — E11.8 TYPE 2 DIABETES MELLITUS WITH COMPLICATION, WITHOUT LONG-TERM CURRENT USE OF INSULIN (HCC): ICD-10-CM

## 2024-02-29 DIAGNOSIS — E03.9 ACQUIRED HYPOTHYROIDISM: ICD-10-CM

## 2024-02-29 DIAGNOSIS — Z13.31 POSITIVE DEPRESSION SCREENING: ICD-10-CM

## 2024-02-29 DIAGNOSIS — E11.8 TYPE 2 DIABETES MELLITUS WITH COMPLICATION, WITHOUT LONG-TERM CURRENT USE OF INSULIN (HCC): Primary | ICD-10-CM

## 2024-02-29 DIAGNOSIS — N32.81 OAB (OVERACTIVE BLADDER): ICD-10-CM

## 2024-02-29 DIAGNOSIS — E78.2 MIXED HYPERLIPIDEMIA: ICD-10-CM

## 2024-02-29 PROCEDURE — 3078F DIAST BP <80 MM HG: CPT | Performed by: NURSE PRACTITIONER

## 2024-02-29 PROCEDURE — 1036F TOBACCO NON-USER: CPT | Performed by: NURSE PRACTITIONER

## 2024-02-29 PROCEDURE — G8484 FLU IMMUNIZE NO ADMIN: HCPCS | Performed by: NURSE PRACTITIONER

## 2024-02-29 PROCEDURE — 1090F PRES/ABSN URINE INCON ASSESS: CPT | Performed by: NURSE PRACTITIONER

## 2024-02-29 PROCEDURE — 3074F SYST BP LT 130 MM HG: CPT | Performed by: NURSE PRACTITIONER

## 2024-02-29 PROCEDURE — 1123F ACP DISCUSS/DSCN MKR DOCD: CPT | Performed by: NURSE PRACTITIONER

## 2024-02-29 PROCEDURE — G8427 DOCREV CUR MEDS BY ELIG CLIN: HCPCS | Performed by: NURSE PRACTITIONER

## 2024-02-29 PROCEDURE — 99214 OFFICE O/P EST MOD 30 MIN: CPT | Performed by: NURSE PRACTITIONER

## 2024-02-29 PROCEDURE — 3051F HG A1C>EQUAL 7.0%<8.0%: CPT | Performed by: NURSE PRACTITIONER

## 2024-02-29 PROCEDURE — G8399 PT W/DXA RESULTS DOCUMENT: HCPCS | Performed by: NURSE PRACTITIONER

## 2024-02-29 PROCEDURE — G8417 CALC BMI ABV UP PARAM F/U: HCPCS | Performed by: NURSE PRACTITIONER

## 2024-02-29 RX ORDER — SEMAGLUTIDE 1.34 MG/ML
1 INJECTION, SOLUTION SUBCUTANEOUS WEEKLY
Qty: 3 ML | Refills: 5 | Status: SHIPPED | OUTPATIENT
Start: 2024-02-29

## 2024-02-29 ASSESSMENT — ENCOUNTER SYMPTOMS
VOMITING: 0
VOICE CHANGE: 0
PHOTOPHOBIA: 0
SHORTNESS OF BREATH: 0
RHINORRHEA: 0
NAUSEA: 0
COLOR CHANGE: 0
COUGH: 0
BACK PAIN: 0

## 2024-02-29 ASSESSMENT — PATIENT HEALTH QUESTIONNAIRE - PHQ9
SUM OF ALL RESPONSES TO PHQ QUESTIONS 1-9: 2
2. FEELING DOWN, DEPRESSED OR HOPELESS: 1
SUM OF ALL RESPONSES TO PHQ QUESTIONS 1-9: 2
SUM OF ALL RESPONSES TO PHQ QUESTIONS 1-9: 2
1. LITTLE INTEREST OR PLEASURE IN DOING THINGS: 1
SUM OF ALL RESPONSES TO PHQ9 QUESTIONS 1 & 2: 2
SUM OF ALL RESPONSES TO PHQ QUESTIONS 1-9: 2

## 2024-02-29 NOTE — PROGRESS NOTES
and dry.   Neurological:      Mental Status: She is alert and oriented to person, place, and time.   Psychiatric:         Behavior: Behavior normal.       /68   Pulse 74   Temp 97.3 °F (36.3 °C) (Temporal)   Ht 1.549 m (5' 0.98\")   Wt 73.3 kg (161 lb 9.6 oz)   LMP  (LMP Unknown)   SpO2 97%   BMI 30.55 kg/m²     Assessment:       Diagnosis Orders   1. Type 2 diabetes mellitus with complication, without long-term current use of insulin (HCC)        2. Acquired hypothyroidism  TSH      3. Mixed hyperlipidemia              Plan:   Total time spent today caring for this patient was 30 minutes    Be sure to take ALL medications as directed without skipping doses.   Recheck TSH in 4-6 weeks.   Follow-up in 3 months or sooner if needed.   A1C in office next visit.    PDMP Monitoring:    Last PDMP [place] as Reviewed:  Review User Review Instant Review Result            Urine Drug Screenings (1 yr)    No resulted procedures found.       Medication Contract and Consent for Opioid Use Documents Filed        No documents found                     Patient given educational materials -see patient instructions.  Discussed use, benefit, and side effects of prescribed medications.  All patient questions answered.  Pt voiced understanding. Reviewed health maintenance.  Instructed to continue currentmedications, diet and exercise.  Patient agreed with treatment plan. Follow up as directed.   MEDICATIONS:  Orders Placed This Encounter   Medications    Semaglutide, 1 MG/DOSE, (OZEMPIC, 1 MG/DOSE,) 4 MG/3ML SOPN     Sig: Inject 1 mg into the skin once a week     Dispense:  3 mL     Refill:  5         ORDERS:  Orders Placed This Encounter   Procedures    TSH       Follow-up:  Return in about 3 months (around 5/29/2024) for in office/poc A1C.    PATIENT INSTRUCTIONS:  Patient Instructions   Be sure to take ALL medications as directed without skipping doses.   Recheck TSH in 4-6 weeks.   Follow-up in 3 months or sooner if needed.

## 2024-02-29 NOTE — PATIENT INSTRUCTIONS
Be sure to take ALL medications as directed without skipping doses.   Recheck TSH in 4-6 weeks.   Follow-up in 3 months or sooner if needed.   A1C in office next visit.

## 2024-03-01 RX ORDER — OXYBUTYNIN CHLORIDE 10 MG/1
TABLET, EXTENDED RELEASE ORAL
Qty: 90 TABLET | Refills: 1 | Status: SHIPPED | OUTPATIENT
Start: 2024-03-01

## 2024-03-01 RX ORDER — VENLAFAXINE HYDROCHLORIDE 150 MG/1
CAPSULE, EXTENDED RELEASE ORAL
Qty: 30 CAPSULE | Refills: 2 | Status: SHIPPED | OUTPATIENT
Start: 2024-03-01

## 2024-03-01 RX ORDER — DAPAGLIFLOZIN 10 MG/1
TABLET, FILM COATED ORAL
Qty: 90 TABLET | Refills: 1 | Status: SHIPPED | OUTPATIENT
Start: 2024-03-01

## 2024-03-04 DIAGNOSIS — M85.80 OSTEOPENIA, UNSPECIFIED LOCATION: ICD-10-CM

## 2024-03-04 DIAGNOSIS — E11.8 TYPE 2 DIABETES MELLITUS WITH COMPLICATION, WITHOUT LONG-TERM CURRENT USE OF INSULIN (HCC): ICD-10-CM

## 2024-03-04 DIAGNOSIS — M51.36 DEGENERATIVE DISC DISEASE, LUMBAR: ICD-10-CM

## 2024-03-04 RX ORDER — CYCLOBENZAPRINE HCL 10 MG
TABLET ORAL
Qty: 20 TABLET | Refills: 0 | OUTPATIENT
Start: 2024-03-04

## 2024-03-04 RX ORDER — GLIMEPIRIDE 4 MG/1
TABLET ORAL
Qty: 60 TABLET | Refills: 3 | Status: SHIPPED | OUTPATIENT
Start: 2024-03-04

## 2024-03-05 NOTE — TELEPHONE ENCOUNTER
Heidi Johansen called to request a refill on her medication.      Last office visit : 2/29/2024   Next office visit : 5/29/2024     Requested Prescriptions     Pending Prescriptions Disp Refills    alendronate (FOSAMAX) 70 MG tablet [Pharmacy Med Name: ALENDRONATE SODIUM 70 MG TAB] 4 tablet 0     Sig: TAKE ONE TABLET EVERY WEEK PATIENT WILL NEED APPOINTMENT FOR FURTHER REFILLS            Izabella Mustafa MA

## 2024-03-06 RX ORDER — ALENDRONATE SODIUM 70 MG/1
TABLET ORAL
Qty: 4 TABLET | Refills: 0 | Status: SHIPPED | OUTPATIENT
Start: 2024-03-06

## 2024-03-21 DIAGNOSIS — E03.9 ACQUIRED HYPOTHYROIDISM: ICD-10-CM

## 2024-03-21 LAB — TSH SERPL DL<=0.005 MIU/L-ACNC: 17.04 UIU/ML (ref 0.27–4.2)

## 2024-04-07 DIAGNOSIS — M85.80 OSTEOPENIA, UNSPECIFIED LOCATION: ICD-10-CM

## 2024-04-08 RX ORDER — ALENDRONATE SODIUM 70 MG/1
TABLET ORAL
Qty: 4 TABLET | Refills: 0 | Status: SHIPPED | OUTPATIENT
Start: 2024-04-08

## 2024-04-10 DIAGNOSIS — E11.8 TYPE 2 DIABETES MELLITUS WITH COMPLICATION, WITHOUT LONG-TERM CURRENT USE OF INSULIN (HCC): ICD-10-CM

## 2024-04-10 DIAGNOSIS — M51.36 DEGENERATIVE DISC DISEASE, LUMBAR: ICD-10-CM

## 2024-04-10 NOTE — TELEPHONE ENCOUNTER
Heidi Johansen called to request a refill on her medication.      Last office visit : 2/29/2024   Next office visit : 5/29/2024     Requested Prescriptions     Pending Prescriptions Disp Refills    levothyroxine (SYNTHROID) 150 MCG tablet [Pharmacy Med Name: LEVOTHYROXINE 150 MCG TABLET] 14 tablet 0     Sig: TAKE 1 TABLET BY MOUTH DAILY PATIENT WILL NEED APPOINTMENT FOR FURTHER REFILLS    glimepiride (AMARYL) 4 MG tablet [Pharmacy Med Name: GLIMEPIRIDE 4 MG TABLET] 180 tablet 1     Sig: TAKE 1 TABLET BY MOUTH TWICE A DAY            Izabella Mustafa MA

## 2024-04-10 NOTE — TELEPHONE ENCOUNTER
Rx Refill Note  Requested Prescriptions     Pending Prescriptions Disp Refills    cyclobenzaprine (FLEXERIL) 10 MG tablet [Pharmacy Med Name: CYCLOBENZAPRINE 10 MG TABLET] 20 tablet 0     Sig: TAKE 1/2 TO 1 TABLET BY MOUTH AT BEDTIME AS NEEDED      Last office visit with prescribing clinician: 10/10/2022   Last telemedicine visit with prescribing clinician: Visit date not found   Next office visit with prescribing clinician: Visit date not found        Pat Ontiveros MA  04/10/24, 13:49 CDT

## 2024-04-11 RX ORDER — CYCLOBENZAPRINE HCL 10 MG
TABLET ORAL
Qty: 20 TABLET | Refills: 0 | OUTPATIENT
Start: 2024-04-11

## 2024-04-12 RX ORDER — LEVOTHYROXINE SODIUM 0.15 MG/1
150 TABLET ORAL DAILY
Qty: 14 TABLET | Refills: 0 | Status: SHIPPED | OUTPATIENT
Start: 2024-04-12

## 2024-04-12 RX ORDER — GLIMEPIRIDE 4 MG/1
TABLET ORAL
Qty: 180 TABLET | Refills: 1 | Status: SHIPPED | OUTPATIENT
Start: 2024-04-12

## 2024-04-22 RX ORDER — LEVOTHYROXINE SODIUM 0.15 MG/1
150 TABLET ORAL DAILY
Qty: 14 TABLET | Refills: 0 | Status: SHIPPED | OUTPATIENT
Start: 2024-04-22

## 2024-05-01 DIAGNOSIS — M85.80 OSTEOPENIA, UNSPECIFIED LOCATION: ICD-10-CM

## 2024-05-02 RX ORDER — ALENDRONATE SODIUM 70 MG/1
TABLET ORAL
Qty: 12 TABLET | Refills: 1 | Status: SHIPPED | OUTPATIENT
Start: 2024-05-02

## 2024-05-02 NOTE — TELEPHONE ENCOUNTER
Heidi Johansen called to request a refill on her medication.      Last office visit : 2/29/2024   Next office visit : 5/29/2024     Requested Prescriptions     Pending Prescriptions Disp Refills    alendronate (FOSAMAX) 70 MG tablet [Pharmacy Med Name: ALENDRONATE SODIUM 70 MG TAB] 12 tablet 1     Sig: TAKE ONE TABLET EVERY WEEK PATIENT WILL NEED APPOINTMENT FOR FURTHER REFILLS            Lisha Rodgers MA

## 2024-05-23 DIAGNOSIS — Z13.31 POSITIVE DEPRESSION SCREENING: ICD-10-CM

## 2024-05-23 RX ORDER — VENLAFAXINE HYDROCHLORIDE 150 MG/1
CAPSULE, EXTENDED RELEASE ORAL
Qty: 90 CAPSULE | Refills: 1 | OUTPATIENT
Start: 2024-05-23

## 2024-05-27 DIAGNOSIS — Z13.31 POSITIVE DEPRESSION SCREENING: ICD-10-CM

## 2024-05-28 RX ORDER — VENLAFAXINE HYDROCHLORIDE 150 MG/1
CAPSULE, EXTENDED RELEASE ORAL
Qty: 30 CAPSULE | Refills: 2 | Status: SHIPPED | OUTPATIENT
Start: 2024-05-28

## 2024-05-29 ENCOUNTER — OFFICE VISIT (OUTPATIENT)
Dept: PRIMARY CARE CLINIC | Age: 79
End: 2024-05-29
Payer: MEDICARE

## 2024-05-29 VITALS
SYSTOLIC BLOOD PRESSURE: 124 MMHG | HEIGHT: 61 IN | HEART RATE: 75 BPM | WEIGHT: 168 LBS | OXYGEN SATURATION: 96 % | TEMPERATURE: 97 F | BODY MASS INDEX: 31.72 KG/M2 | DIASTOLIC BLOOD PRESSURE: 74 MMHG

## 2024-05-29 DIAGNOSIS — E03.9 ACQUIRED HYPOTHYROIDISM: ICD-10-CM

## 2024-05-29 DIAGNOSIS — E11.8 TYPE 2 DIABETES MELLITUS WITH COMPLICATION, WITHOUT LONG-TERM CURRENT USE OF INSULIN (HCC): ICD-10-CM

## 2024-05-29 DIAGNOSIS — Z00.00 MEDICARE ANNUAL WELLNESS VISIT, SUBSEQUENT: Primary | ICD-10-CM

## 2024-05-29 PROCEDURE — 1123F ACP DISCUSS/DSCN MKR DOCD: CPT | Performed by: NURSE PRACTITIONER

## 2024-05-29 PROCEDURE — 3078F DIAST BP <80 MM HG: CPT | Performed by: NURSE PRACTITIONER

## 2024-05-29 PROCEDURE — G0439 PPPS, SUBSEQ VISIT: HCPCS | Performed by: NURSE PRACTITIONER

## 2024-05-29 PROCEDURE — 3074F SYST BP LT 130 MM HG: CPT | Performed by: NURSE PRACTITIONER

## 2024-05-29 PROCEDURE — 3051F HG A1C>EQUAL 7.0%<8.0%: CPT | Performed by: NURSE PRACTITIONER

## 2024-05-29 RX ORDER — LEVOTHYROXINE SODIUM 0.15 MG/1
150 TABLET ORAL DAILY
Qty: 90 TABLET | Refills: 1 | Status: SHIPPED | OUTPATIENT
Start: 2024-05-29

## 2024-05-29 SDOH — ECONOMIC STABILITY: FOOD INSECURITY: WITHIN THE PAST 12 MONTHS, YOU WORRIED THAT YOUR FOOD WOULD RUN OUT BEFORE YOU GOT MONEY TO BUY MORE.: NEVER TRUE

## 2024-05-29 SDOH — ECONOMIC STABILITY: FOOD INSECURITY: WITHIN THE PAST 12 MONTHS, THE FOOD YOU BOUGHT JUST DIDN'T LAST AND YOU DIDN'T HAVE MONEY TO GET MORE.: NEVER TRUE

## 2024-05-29 SDOH — ECONOMIC STABILITY: INCOME INSECURITY: HOW HARD IS IT FOR YOU TO PAY FOR THE VERY BASICS LIKE FOOD, HOUSING, MEDICAL CARE, AND HEATING?: NOT HARD AT ALL

## 2024-05-29 ASSESSMENT — PATIENT HEALTH QUESTIONNAIRE - PHQ9
SUM OF ALL RESPONSES TO PHQ QUESTIONS 1-9: 0
SUM OF ALL RESPONSES TO PHQ9 QUESTIONS 1 & 2: 0
SUM OF ALL RESPONSES TO PHQ QUESTIONS 1-9: 0
SUM OF ALL RESPONSES TO PHQ QUESTIONS 1-9: 0
1. LITTLE INTEREST OR PLEASURE IN DOING THINGS: NOT AT ALL
SUM OF ALL RESPONSES TO PHQ QUESTIONS 1-9: 0
2. FEELING DOWN, DEPRESSED OR HOPELESS: NOT AT ALL

## 2024-05-29 NOTE — PROGRESS NOTES
ability and willingness to undergo diagnosis and treatment. Counseled on the importance of maintaining cigarette smoking abstinence and cessation. The patient has a history of >20 pack years and is either still smoking or quit within the last 15 years. There are no signs or symptoms of lung cancer.            Objective   Vitals:    05/29/24 1042   BP: 124/74   Pulse: 75   Temp: 97 °F (36.1 °C)   TempSrc: Temporal   SpO2: 96%   Weight: 76.2 kg (168 lb)   Height: 1.549 m (5' 0.98\")      Body mass index is 31.76 kg/m².      General Appearance: alert and oriented to person, place and time, well developed and well- nourished, in no acute distress  Skin: warm and dry, no rash or erythema  Head: normocephalic and atraumatic  Eyes: pupils equal, round, and reactive to light, extraocular eye movements intact, conjunctivae normal  ENT: tympanic membrane, external ear and ear canal normal bilaterally, nose without deformity, nasal mucosa and turbinates normal without polyps  Neck: supple and non-tender without mass, no thyromegaly or thyroid nodules, no cervical lymphadenopathy  Pulmonary/Chest: clear to auscultation bilaterally- no wheezes, rales or rhonchi, normal air movement, no respiratory distress  Cardiovascular: normal rate, regular rhythm, normal S1 and S2, no murmurs, rubs, clicks, or gallops, distal pulses intact, no carotid bruits  Abdomen: soft, non-tender, non-distended, normal bowel sounds, no masses or organomegaly  Extremities: no cyanosis, clubbing or edema  Musculoskeletal: normal range of motion, no joint swelling, deformity or tenderness  Neurologic: reflexes normal and symmetric, no cranial nerve deficit, gait, coordination and speech normal       Allergies   Allergen Reactions    Bee Venom Shortness Of Breath and Swelling    Adhesive Tape Rash     Prior to Visit Medications    Medication Sig Taking? Authorizing Provider   levothyroxine (SYNTHROID) 150 MCG tablet Take 1 tablet by mouth Daily Patient will

## 2024-05-29 NOTE — PATIENT INSTRUCTIONS
Press firmly, and move the brush in small circles over the surface of the teeth.  Be careful not to brush too hard. Vigorous brushing can make the gums pull away from the teeth and can scratch the tooth enamel.  Brush all surfaces of the teeth, on the tongue side and on the cheek side. Pay special attention to the front teeth and all surfaces of the back teeth.  Brush chewing surfaces with short back-and-forth strokes.  After you've finished, help the person rinse the remaining toothpaste from their mouth.  Where can you learn more?  Go to https://www.[employer].net/patientEd and enter F944 to learn more about \"Learning About Dental Care for Older Adults.\"  Current as of: August 6, 2023               Content Version: 14.0  © 2006-2024 [employer].   Care instructions adapted under license by [employer]. If you have questions about a medical condition or this instruction, always ask your healthcare professional. [employer] disclaims any warranty or liability for your use of this information.           Starting a Weight Loss Plan: Care Instructions  Overview     It can be a challenge to lose weight. But your doctor can help you make a weight-loss plan that meets your needs.  You don't have to make a lot of big changes at once. A better idea might be to focus on small changes and stick with them. When those changes become habit, you can add a few more changes.  Some people find it helpful to take an exercise or nutrition class. If you have questions, ask your doctor about seeing a registered dietitian or an exercise specialist. You might also think about joining a weight-loss support group.  If you're not ready to make changes right now, try to pick a date in the future. Then make an appointment with your doctor to talk about when and how you'll get started with a plan.  Follow-up care is a key part of your treatment and safety. Be sure to make and go to all appointments, and call your

## 2024-06-28 DIAGNOSIS — Z13.31 POSITIVE DEPRESSION SCREENING: ICD-10-CM

## 2024-07-01 RX ORDER — VENLAFAXINE HYDROCHLORIDE 150 MG/1
CAPSULE, EXTENDED RELEASE ORAL
Qty: 90 CAPSULE | Refills: 1 | Status: SHIPPED | OUTPATIENT
Start: 2024-07-01

## 2024-08-26 RX ORDER — SEMAGLUTIDE 1.34 MG/ML
INJECTION, SOLUTION SUBCUTANEOUS
Qty: 1 ADJUSTABLE DOSE PRE-FILLED PEN SYRINGE | Refills: 1 | Status: SHIPPED | OUTPATIENT
Start: 2024-08-26

## 2024-08-31 DIAGNOSIS — E11.8 TYPE 2 DIABETES MELLITUS WITH COMPLICATION, WITHOUT LONG-TERM CURRENT USE OF INSULIN (HCC): ICD-10-CM

## 2024-08-31 RX ORDER — DAPAGLIFLOZIN 10 MG/1
TABLET, FILM COATED ORAL
Qty: 90 TABLET | Refills: 1 | Status: SHIPPED | OUTPATIENT
Start: 2024-08-31

## 2024-09-07 DIAGNOSIS — E11.8 TYPE 2 DIABETES MELLITUS WITH COMPLICATION, WITHOUT LONG-TERM CURRENT USE OF INSULIN (HCC): ICD-10-CM

## 2024-09-09 RX ORDER — GLIMEPIRIDE 4 MG/1
TABLET ORAL
Qty: 180 TABLET | Refills: 1 | Status: SHIPPED | OUTPATIENT
Start: 2024-09-09

## 2024-10-12 DIAGNOSIS — N32.81 OAB (OVERACTIVE BLADDER): ICD-10-CM

## 2024-10-14 RX ORDER — OXYBUTYNIN CHLORIDE 10 MG/1
TABLET, EXTENDED RELEASE ORAL
Qty: 90 TABLET | Refills: 1 | Status: SHIPPED | OUTPATIENT
Start: 2024-10-14

## 2024-10-20 DIAGNOSIS — M85.80 OSTEOPENIA, UNSPECIFIED LOCATION: ICD-10-CM

## 2024-10-23 RX ORDER — ALENDRONATE SODIUM 70 MG/1
TABLET ORAL
Qty: 4 TABLET | Refills: 0 | Status: SHIPPED | OUTPATIENT
Start: 2024-10-23

## 2024-10-23 NOTE — TELEPHONE ENCOUNTER
Heidi JAMI Johansen called to request a refill on her medication.      Last office visit : 5/29/2024   Next office visit : 12/2/2024     Requested Prescriptions     Pending Prescriptions Disp Refills    alendronate (FOSAMAX) 70 MG tablet [Pharmacy Med Name: ALENDRONATE SODIUM 70 MG TAB] 4 tablet 0     Sig: TAKE 1 TABLET BY MOUTH ONCE WEEKLY (PT WILL NEED APPT FOR FURTHER REFILLS)\            Cyn Glasgow LPN

## 2024-11-05 DIAGNOSIS — M85.80 OSTEOPENIA, UNSPECIFIED LOCATION: ICD-10-CM

## 2024-11-06 RX ORDER — ALENDRONATE SODIUM 70 MG/1
TABLET ORAL
Qty: 12 TABLET | Refills: 1 | Status: SHIPPED | OUTPATIENT
Start: 2024-11-06

## 2024-11-06 NOTE — TELEPHONE ENCOUNTER
Heidi Johansen called to request a refill on her medication.      Last office visit : 5/29/2024   Next office visit : 12/2/2024     Requested Prescriptions     Pending Prescriptions Disp Refills    alendronate (FOSAMAX) 70 MG tablet [Pharmacy Med Name: ALENDRONATE SODIUM 70 MG TAB] 12 tablet 1     Sig: TAKE 1 TABLET BY MOUTH ONCE WEEKLY (PT WILL NEED APPT FOR FURTHER REFILLS)\            Rachel Ariza MA

## 2024-12-02 ENCOUNTER — OFFICE VISIT (OUTPATIENT)
Dept: PRIMARY CARE CLINIC | Age: 79
End: 2024-12-02
Payer: MEDICARE

## 2024-12-02 VITALS
SYSTOLIC BLOOD PRESSURE: 126 MMHG | HEIGHT: 61 IN | WEIGHT: 160 LBS | TEMPERATURE: 97 F | DIASTOLIC BLOOD PRESSURE: 70 MMHG | HEART RATE: 82 BPM | BODY MASS INDEX: 30.21 KG/M2 | OXYGEN SATURATION: 96 %

## 2024-12-02 DIAGNOSIS — N32.81 OAB (OVERACTIVE BLADDER): ICD-10-CM

## 2024-12-02 DIAGNOSIS — E78.2 MIXED HYPERLIPIDEMIA: ICD-10-CM

## 2024-12-02 DIAGNOSIS — I10 ESSENTIAL HYPERTENSION: ICD-10-CM

## 2024-12-02 DIAGNOSIS — E11.8 TYPE 2 DIABETES MELLITUS WITH COMPLICATION, WITHOUT LONG-TERM CURRENT USE OF INSULIN (HCC): Primary | ICD-10-CM

## 2024-12-02 DIAGNOSIS — E11.9 TYPE 2 DIABETES MELLITUS WITHOUT COMPLICATION, WITHOUT LONG-TERM CURRENT USE OF INSULIN (HCC): ICD-10-CM

## 2024-12-02 DIAGNOSIS — E03.9 ACQUIRED HYPOTHYROIDISM: ICD-10-CM

## 2024-12-02 PROCEDURE — 3078F DIAST BP <80 MM HG: CPT | Performed by: NURSE PRACTITIONER

## 2024-12-02 PROCEDURE — 99214 OFFICE O/P EST MOD 30 MIN: CPT | Performed by: NURSE PRACTITIONER

## 2024-12-02 PROCEDURE — 1090F PRES/ABSN URINE INCON ASSESS: CPT | Performed by: NURSE PRACTITIONER

## 2024-12-02 PROCEDURE — 1159F MED LIST DOCD IN RCRD: CPT | Performed by: NURSE PRACTITIONER

## 2024-12-02 PROCEDURE — 3074F SYST BP LT 130 MM HG: CPT | Performed by: NURSE PRACTITIONER

## 2024-12-02 PROCEDURE — G8427 DOCREV CUR MEDS BY ELIG CLIN: HCPCS | Performed by: NURSE PRACTITIONER

## 2024-12-02 PROCEDURE — 1036F TOBACCO NON-USER: CPT | Performed by: NURSE PRACTITIONER

## 2024-12-02 PROCEDURE — 1123F ACP DISCUSS/DSCN MKR DOCD: CPT | Performed by: NURSE PRACTITIONER

## 2024-12-02 PROCEDURE — G8417 CALC BMI ABV UP PARAM F/U: HCPCS | Performed by: NURSE PRACTITIONER

## 2024-12-02 PROCEDURE — 1160F RVW MEDS BY RX/DR IN RCRD: CPT | Performed by: NURSE PRACTITIONER

## 2024-12-02 PROCEDURE — G8399 PT W/DXA RESULTS DOCUMENT: HCPCS | Performed by: NURSE PRACTITIONER

## 2024-12-02 PROCEDURE — 3051F HG A1C>EQUAL 7.0%<8.0%: CPT | Performed by: NURSE PRACTITIONER

## 2024-12-02 PROCEDURE — G8484 FLU IMMUNIZE NO ADMIN: HCPCS | Performed by: NURSE PRACTITIONER

## 2024-12-02 RX ORDER — MIRABEGRON 50 MG/1
50 TABLET, FILM COATED, EXTENDED RELEASE ORAL DAILY
Qty: 30 TABLET | Refills: 5 | Status: SHIPPED | OUTPATIENT
Start: 2024-12-02

## 2024-12-02 NOTE — PROGRESS NOTES
[facility]  [address]  [address]  [place] [address]  Dept: [phone]  Dept Fax: [phone]  Loc: [phone]    Heidi Johansen is a 79 y.o. female who presents today for her medical conditions/complaints as noted below.  Heidi Johansen is c/o of 6 Month Follow-Up (Pt in office for 6 month follow up - concerned with incontinence. Also stated that she has inflamed nerves in cervical due to disc(s) shifting out of place.)        HPI:     HPI   Chief Complaint   Patient presents with    6 Month Follow-Up     Pt in office for 6 month follow up - concerned with incontinence. Also stated that she has inflamed nerves in cervical due to disc(s) shifting out of place.     Patient presents today for follow-up hypertension, diabetes, mood disorder. She reports blood sugar is not regularly monitored. She discontinued 1 mg Ozempic 2 months ago due to abdominal pain and GI issues. She states she does not recall having these symptoms on the lower dose. Blood pressure is well-controlled.     She complains of worsening incontinence. She has history of OAB and is taking oxybutynin.     She is due for fasting labs.     Past Medical History:   Diagnosis Date    CAD (coronary artery disease)     Carotid artery disease (HCC)     CPAP (continuous positive airway pressure) dependence     11cm    Depression     Diabetes (HCC)     Fibromyalgia     Hypothyroidism     JOSE (obstructive sleep apnea)     AHI:  27.3 per [facility], 2011/CPAP    PLMD (periodic limb movement disorder)     RLS (restless legs syndrome)       Past Surgical History:   Procedure Laterality Date    COLONOSCOPY  12/04/2014    Dr Lugo- no polyps    COLONOSCOPY  07/25/2019    Dr FARHEEN Bhakta-Normal, 5 yr recall    COLONOSCOPY N/A 7/25/2019    COLORECTAL CANCER SCREENING, NOT HIGH RISK performed by Gurjit Bhakta MD at [facility] OR    OTHER SURGICAL HISTORY      teeth extractions    PTCA      with stents    TUBAL LIGATION

## 2024-12-02 NOTE — PATIENT INSTRUCTIONS
Fasting labs in [address] within 1-2 weeks.   Ozempic 0.5 mg injection weekly.  Discontinue Oxybutynin and begin Myrbetriq 100 mg daily.  Follow-up in 6 months for medicare annual wellness visit.

## 2024-12-27 DIAGNOSIS — Z13.31 POSITIVE DEPRESSION SCREENING: ICD-10-CM

## 2024-12-27 NOTE — TELEPHONE ENCOUNTER
Heidi FARRELL Woodlake called to request a refill on her medication.      Last office visit : 12/2/2024   Next office visit : 6/2/2025     Requested Prescriptions     Pending Prescriptions Disp Refills    venlafaxine (EFFEXOR XR) 150 MG extended release capsule [Pharmacy Med Name: VENLAFAXINE HCL  MG CAP] 90 capsule 1     Sig: TAKE 1 CAPSULE BY MOUTH EVERY DAY. MUST KEEP APPOINTMENT FOR FUTURE REFILLS.            Lorenzo Eason MA

## 2025-01-01 RX ORDER — VENLAFAXINE HYDROCHLORIDE 150 MG/1
CAPSULE, EXTENDED RELEASE ORAL
Qty: 90 CAPSULE | Refills: 1 | Status: SHIPPED | OUTPATIENT
Start: 2025-01-01

## 2025-01-17 ENCOUNTER — TELEPHONE (OUTPATIENT)
Age: 80
End: 2025-01-17

## 2025-01-17 NOTE — TELEPHONE ENCOUNTER
Processed PA for patient for Ozempic:   Information regarding your request  This drug/product is not covered under the pharmacy benefit. Prior Authorization is not available

## 2025-05-19 ENCOUNTER — RESULTS FOLLOW-UP (OUTPATIENT)
Dept: PRIMARY CARE CLINIC | Age: 80
End: 2025-05-19

## 2025-05-19 DIAGNOSIS — I10 ESSENTIAL HYPERTENSION: ICD-10-CM

## 2025-05-19 DIAGNOSIS — E78.2 MIXED HYPERLIPIDEMIA: ICD-10-CM

## 2025-05-19 DIAGNOSIS — E11.9 TYPE 2 DIABETES MELLITUS WITHOUT COMPLICATION, WITHOUT LONG-TERM CURRENT USE OF INSULIN (HCC): ICD-10-CM

## 2025-05-19 DIAGNOSIS — E03.9 ACQUIRED HYPOTHYROIDISM: ICD-10-CM

## 2025-05-19 DIAGNOSIS — E11.8 TYPE 2 DIABETES MELLITUS WITH COMPLICATION, WITHOUT LONG-TERM CURRENT USE OF INSULIN (HCC): ICD-10-CM

## 2025-05-19 LAB
ALBUMIN SERPL-MCNC: 4.4 G/DL (ref 3.5–5.2)
ALP SERPL-CCNC: 81 U/L (ref 35–104)
ALT SERPL-CCNC: 19 U/L (ref 10–35)
ANION GAP SERPL CALCULATED.3IONS-SCNC: 13 MMOL/L (ref 8–16)
AST SERPL-CCNC: 22 U/L (ref 10–35)
BASOPHILS # BLD: 0.1 K/UL (ref 0–0.2)
BASOPHILS NFR BLD: 1 % (ref 0–1)
BILIRUB SERPL-MCNC: 0.5 MG/DL (ref 0.2–1.2)
BUN SERPL-MCNC: 21 MG/DL (ref 8–23)
CALCIUM SERPL-MCNC: 9.9 MG/DL (ref 8.8–10.2)
CHLORIDE SERPL-SCNC: 100 MMOL/L (ref 98–107)
CHOLEST SERPL-MCNC: 206 MG/DL (ref 0–199)
CO2 SERPL-SCNC: 25 MMOL/L (ref 22–29)
CREAT SERPL-MCNC: 0.8 MG/DL (ref 0.5–0.9)
EOSINOPHIL # BLD: 0.2 K/UL (ref 0–0.6)
EOSINOPHIL NFR BLD: 2.5 % (ref 0–5)
ERYTHROCYTE [DISTWIDTH] IN BLOOD BY AUTOMATED COUNT: 12.8 % (ref 11.5–14.5)
GLUCOSE SERPL-MCNC: 219 MG/DL (ref 70–99)
HBA1C MFR BLD: 10.8 % (ref 4–5.6)
HCT VFR BLD AUTO: 44 % (ref 37–47)
HDLC SERPL-MCNC: 65 MG/DL (ref 40–60)
HGB BLD-MCNC: 14.4 G/DL (ref 12–16)
IMM GRANULOCYTES # BLD: 0.1 K/UL
LDLC SERPL CALC-MCNC: 108 MG/DL
LYMPHOCYTES # BLD: 3.2 K/UL (ref 1.1–4.5)
LYMPHOCYTES NFR BLD: 38.5 % (ref 20–40)
MCH RBC QN AUTO: 30 PG (ref 27–31)
MCHC RBC AUTO-ENTMCNC: 32.7 G/DL (ref 33–37)
MCV RBC AUTO: 91.7 FL (ref 81–99)
MONOCYTES # BLD: 0.6 K/UL (ref 0–0.9)
MONOCYTES NFR BLD: 7.1 % (ref 0–10)
NEUTROPHILS # BLD: 4.2 K/UL (ref 1.5–7.5)
NEUTS SEG NFR BLD: 50.3 % (ref 50–65)
PLATELET # BLD AUTO: 279 K/UL (ref 130–400)
PMV BLD AUTO: 11.5 FL (ref 9.4–12.3)
POTASSIUM SERPL-SCNC: 4.1 MMOL/L (ref 3.5–5.1)
PROT SERPL-MCNC: 7.1 G/DL (ref 6.4–8.3)
RBC # BLD AUTO: 4.8 M/UL (ref 4.2–5.4)
SODIUM SERPL-SCNC: 138 MMOL/L (ref 136–145)
TRIGL SERPL-MCNC: 163 MG/DL (ref 0–149)
TSH SERPL DL<=0.005 MIU/L-ACNC: 17.8 UIU/ML (ref 0.27–4.2)
WBC # BLD AUTO: 8.3 K/UL (ref 4.8–10.8)

## 2025-05-19 NOTE — RESULT ENCOUNTER NOTE
Is patient taking all of her medications as prescribed? Her TSH is still elevated and A1C has increased more. She has upcoming visit on 6/2/25 with me. She needs to bring ALL her medications in and be aware of what she is taking daily so that we can help manage these abnormal results.

## 2025-06-02 ENCOUNTER — OFFICE VISIT (OUTPATIENT)
Dept: PRIMARY CARE CLINIC | Age: 80
End: 2025-06-02
Payer: MEDICARE

## 2025-06-02 VITALS
OXYGEN SATURATION: 96 % | HEART RATE: 76 BPM | HEIGHT: 61 IN | WEIGHT: 164.4 LBS | BODY MASS INDEX: 31.04 KG/M2 | SYSTOLIC BLOOD PRESSURE: 118 MMHG | TEMPERATURE: 97.2 F | DIASTOLIC BLOOD PRESSURE: 76 MMHG

## 2025-06-02 DIAGNOSIS — M85.80 OSTEOPENIA, UNSPECIFIED LOCATION: ICD-10-CM

## 2025-06-02 DIAGNOSIS — R60.0 EDEMA OF RIGHT LOWER EXTREMITY: ICD-10-CM

## 2025-06-02 DIAGNOSIS — M54.31 SCIATICA OF RIGHT SIDE: ICD-10-CM

## 2025-06-02 DIAGNOSIS — E78.2 MIXED HYPERLIPIDEMIA: ICD-10-CM

## 2025-06-02 DIAGNOSIS — Z00.00 MEDICARE ANNUAL WELLNESS VISIT, SUBSEQUENT: Primary | ICD-10-CM

## 2025-06-02 DIAGNOSIS — I10 ESSENTIAL HYPERTENSION: ICD-10-CM

## 2025-06-02 DIAGNOSIS — E03.9 ACQUIRED HYPOTHYROIDISM: ICD-10-CM

## 2025-06-02 DIAGNOSIS — R10.31 RIGHT GROIN PAIN: ICD-10-CM

## 2025-06-02 DIAGNOSIS — E11.8 TYPE 2 DIABETES MELLITUS WITH COMPLICATION, WITHOUT LONG-TERM CURRENT USE OF INSULIN (HCC): ICD-10-CM

## 2025-06-02 DIAGNOSIS — F39 MOOD DISORDER: ICD-10-CM

## 2025-06-02 PROCEDURE — G8427 DOCREV CUR MEDS BY ELIG CLIN: HCPCS | Performed by: NURSE PRACTITIONER

## 2025-06-02 PROCEDURE — 1123F ACP DISCUSS/DSCN MKR DOCD: CPT | Performed by: NURSE PRACTITIONER

## 2025-06-02 PROCEDURE — 1159F MED LIST DOCD IN RCRD: CPT | Performed by: NURSE PRACTITIONER

## 2025-06-02 PROCEDURE — 99214 OFFICE O/P EST MOD 30 MIN: CPT | Performed by: NURSE PRACTITIONER

## 2025-06-02 PROCEDURE — 3074F SYST BP LT 130 MM HG: CPT | Performed by: NURSE PRACTITIONER

## 2025-06-02 PROCEDURE — 1036F TOBACCO NON-USER: CPT | Performed by: NURSE PRACTITIONER

## 2025-06-02 PROCEDURE — 1160F RVW MEDS BY RX/DR IN RCRD: CPT | Performed by: NURSE PRACTITIONER

## 2025-06-02 PROCEDURE — G8399 PT W/DXA RESULTS DOCUMENT: HCPCS | Performed by: NURSE PRACTITIONER

## 2025-06-02 PROCEDURE — 3046F HEMOGLOBIN A1C LEVEL >9.0%: CPT | Performed by: NURSE PRACTITIONER

## 2025-06-02 PROCEDURE — 1090F PRES/ABSN URINE INCON ASSESS: CPT | Performed by: NURSE PRACTITIONER

## 2025-06-02 PROCEDURE — G0439 PPPS, SUBSEQ VISIT: HCPCS | Performed by: NURSE PRACTITIONER

## 2025-06-02 PROCEDURE — 3078F DIAST BP <80 MM HG: CPT | Performed by: NURSE PRACTITIONER

## 2025-06-02 PROCEDURE — G8417 CALC BMI ABV UP PARAM F/U: HCPCS | Performed by: NURSE PRACTITIONER

## 2025-06-02 RX ORDER — MIRABEGRON 50 MG/1
50 TABLET, FILM COATED, EXTENDED RELEASE ORAL DAILY
Qty: 30 TABLET | Refills: 5 | Status: SHIPPED | OUTPATIENT
Start: 2025-06-02

## 2025-06-02 RX ORDER — LEVOTHYROXINE SODIUM 150 UG/1
150 TABLET ORAL DAILY
Qty: 90 TABLET | Refills: 1 | Status: SHIPPED | OUTPATIENT
Start: 2025-06-02

## 2025-06-02 RX ORDER — GLIMEPIRIDE 4 MG/1
TABLET ORAL
Qty: 180 TABLET | Refills: 1 | Status: SHIPPED | OUTPATIENT
Start: 2025-06-02

## 2025-06-02 RX ORDER — LISINOPRIL 10 MG/1
TABLET ORAL
Qty: 14 TABLET | Refills: 0 | Status: SHIPPED | OUTPATIENT
Start: 2025-06-02

## 2025-06-02 RX ORDER — VENLAFAXINE HYDROCHLORIDE 150 MG/1
CAPSULE, EXTENDED RELEASE ORAL
Qty: 90 CAPSULE | Refills: 1 | Status: SHIPPED | OUTPATIENT
Start: 2025-06-02

## 2025-06-02 RX ORDER — ERGOCALCIFEROL 1.25 MG/1
50000 CAPSULE, LIQUID FILLED ORAL WEEKLY
Qty: 12 CAPSULE | Refills: 1 | Status: SHIPPED | OUTPATIENT
Start: 2025-06-02

## 2025-06-02 RX ORDER — ALENDRONATE SODIUM 70 MG/1
TABLET ORAL
Qty: 12 TABLET | Refills: 1 | Status: SHIPPED | OUTPATIENT
Start: 2025-06-02

## 2025-06-02 RX ORDER — DICLOFENAC SODIUM 75 MG/1
75 TABLET, DELAYED RELEASE ORAL 2 TIMES DAILY
Qty: 30 TABLET | Refills: 0 | Status: SHIPPED | OUTPATIENT
Start: 2025-06-02

## 2025-06-02 RX ORDER — LOVASTATIN 40 MG/1
40 TABLET ORAL NIGHTLY
Qty: 90 TABLET | Refills: 3 | Status: SHIPPED | OUTPATIENT
Start: 2025-06-02

## 2025-06-02 SDOH — ECONOMIC STABILITY: FOOD INSECURITY: WITHIN THE PAST 12 MONTHS, YOU WORRIED THAT YOUR FOOD WOULD RUN OUT BEFORE YOU GOT MONEY TO BUY MORE.: NEVER TRUE

## 2025-06-02 SDOH — ECONOMIC STABILITY: FOOD INSECURITY: WITHIN THE PAST 12 MONTHS, THE FOOD YOU BOUGHT JUST DIDN'T LAST AND YOU DIDN'T HAVE MONEY TO GET MORE.: NEVER TRUE

## 2025-06-02 ASSESSMENT — PATIENT HEALTH QUESTIONNAIRE - PHQ9
1. LITTLE INTEREST OR PLEASURE IN DOING THINGS: NOT AT ALL
2. FEELING DOWN, DEPRESSED OR HOPELESS: NOT AT ALL
SUM OF ALL RESPONSES TO PHQ QUESTIONS 1-9: 0

## 2025-06-02 NOTE — PROGRESS NOTES
Medicare Annual Wellness Visit    Heidi Johansen is here for Medicare AWV    Assessment & Plan  1. Right groin pain: Acute.  - Potential etiology includes blood clot or sacroiliac joint issue.  - Tenderness noted upon palpation.  - Vascular study and ultrasound of the groin area to exclude blood clot.  - Prescribed diclofenac for two weeks.    2. Diabetes mellitus: Chronic.  - A1c increased from 7 in 02/2024 to 10.8 currently.  - Weight has not substantially changed.  - Reinitiated previous medication regimen.  - Re-evaluate A1c level in three months.    3. Depression: Chronic.  - Reports mood swings and depression.  - Restarted on venlafaxine.    4. Medication management.  - Has not been taking levothyroxine, lisinopril, lovastatin, metformin, Myrbetriq, Fosamax, and vitamin D regularly due to insurance issues.  - All medications refilled.  - Thyroid function and A1c levels reassessed in three months.    Follow-up  - Follow up in three months.    Medicare annual wellness visit, subsequent  -     Hemoglobin A1C; Future  -     TSH; Future  -     Comprehensive Metabolic Panel; Future  Right groin pain  -     Vascular duplex lower extremity venous right; Future  -     Hemoglobin A1C; Future  -     TSH; Future  -     Comprehensive Metabolic Panel; Future  Type 2 diabetes mellitus with complication, without long-term current use of insulin (HCC)  -     glimepiride (AMARYL) 4 MG tablet; TAKE 1 TABLET BY MOUTH TWICE A DAY., Disp-180 tablet, R-1Normal  -     Hemoglobin A1C; Future  -     TSH; Future  -     Comprehensive Metabolic Panel; Future  Acquired hypothyroidism  -     Hemoglobin A1C; Future  -     TSH; Future  -     Comprehensive Metabolic Panel; Future  Mixed hyperlipidemia  -     Hemoglobin A1C; Future  -     TSH; Future  -     Comprehensive Metabolic Panel; Future  Essential hypertension  -     lisinopril (PRINIVIL;ZESTRIL) 10 MG tablet; TAKE 1 TABLET BY MOUTH EVERY DAY. FOLLOW UP APPOINTMENT FOR FUTURE REFILLS.,

## 2025-06-03 ENCOUNTER — TELEPHONE (OUTPATIENT)
Dept: PRIMARY CARE CLINIC | Age: 80
End: 2025-06-03

## 2025-06-03 NOTE — TELEPHONE ENCOUNTER
Received fax to order diabetic supplies, need recent office note to say frequency of testing, diagnosis code, indicate if insulin use or not.

## 2025-06-09 RX ORDER — FESOTERODINE FUMARATE 4 MG/1
4 TABLET, FILM COATED, EXTENDED RELEASE ORAL DAILY
Qty: 90 TABLET | Refills: 1 | Status: SHIPPED | OUTPATIENT
Start: 2025-06-09

## 2025-06-09 NOTE — TELEPHONE ENCOUNTER
Requesting alternative covered by insurance.    vibegron (GEMTESA) 75 MG TABS tablet  mirabegron (MYRBETRIQ) 25 MG TB24  tolterodine (DETROL LA) 2 MG extended release capsule  solifenacin (VESICARE) 5 MG tablet  
no

## 2025-06-20 ENCOUNTER — HOSPITAL ENCOUNTER (OUTPATIENT)
Dept: VASCULAR LAB | Age: 80
Discharge: HOME | End: 2025-06-22
Payer: MEDICARE

## 2025-06-20 DIAGNOSIS — R60.0 EDEMA OF RIGHT LOWER EXTREMITY: ICD-10-CM

## 2025-06-20 DIAGNOSIS — R10.31 RIGHT GROIN PAIN: ICD-10-CM

## 2025-06-20 PROCEDURE — 93971 EXTREMITY STUDY: CPT | Performed by: SURGERY

## 2025-06-20 PROCEDURE — 93971 EXTREMITY STUDY: CPT

## 2025-06-23 ENCOUNTER — TELEPHONE (OUTPATIENT)
Dept: PRIMARY CARE CLINIC | Age: 80
End: 2025-06-23

## 2025-06-23 ENCOUNTER — RESULTS FOLLOW-UP (OUTPATIENT)
Dept: PRIMARY CARE CLINIC | Age: 80
End: 2025-06-23

## 2025-06-23 NOTE — TELEPHONE ENCOUNTER
The patient is requesting results on a [facility] Duplex Lower extremity Right  study. She had it completed on 06-.

## (undated) DEVICE — ENDO KIT,LOURDES HOSPITAL: Brand: MEDLINE INDUSTRIES, INC.